# Patient Record
Sex: FEMALE | Race: WHITE | Employment: FULL TIME | URBAN - METROPOLITAN AREA
[De-identification: names, ages, dates, MRNs, and addresses within clinical notes are randomized per-mention and may not be internally consistent; named-entity substitution may affect disease eponyms.]

---

## 2018-02-06 PROBLEM — O02.1 MISSED ABORTION: Status: ACTIVE | Noted: 2018-02-06

## 2018-04-12 PROBLEM — N96 HISTORY OF RECURRENT MISCARRIAGES: Status: ACTIVE | Noted: 2018-04-12

## 2018-05-02 PROBLEM — D68.59 THROMBOPHILIA (HCC): Status: ACTIVE | Noted: 2018-05-02

## 2018-05-02 PROBLEM — N92.0 MENORRHAGIA WITH REGULAR CYCLE: Status: ACTIVE | Noted: 2018-05-02

## 2018-05-11 DIAGNOSIS — Z00.00 WELL ADULT EXAM: Primary | ICD-10-CM

## 2018-05-15 ENCOUNTER — HOSPITAL ENCOUNTER (OUTPATIENT)
Age: 23
Discharge: HOME | End: 2018-05-15
Payer: COMMERCIAL

## 2018-05-15 DIAGNOSIS — Z00.00 WELL ADULT EXAM: ICD-10-CM

## 2018-05-15 LAB
ALBUMIN SERPL-MCNC: 4.6 G/DL (ref 3.5–5.2)
ALP BLD-CCNC: 61 U/L (ref 35–104)
ALT SERPL-CCNC: 22 U/L (ref 0–32)
ANION GAP SERPL CALCULATED.3IONS-SCNC: 16 MMOL/L (ref 7–16)
AST SERPL-CCNC: 26 U/L (ref 0–31)
BILIRUB SERPL-MCNC: 0.5 MG/DL (ref 0–1.2)
BUN BLDV-MCNC: 11 MG/DL (ref 6–20)
CALCIUM SERPL-MCNC: 9.6 MG/DL (ref 8.6–10.2)
CHLORIDE BLD-SCNC: 95 MMOL/L (ref 98–107)
CHOLESTEROL, TOTAL: 183 MG/DL (ref 0–199)
CO2: 26 MMOL/L (ref 22–29)
CREAT SERPL-MCNC: 0.5 MG/DL (ref 0.5–1)
GFR AFRICAN AMERICAN: >60
GFR NON-AFRICAN AMERICAN: >60 ML/MIN/1.73
GLUCOSE BLD-MCNC: 81 MG/DL (ref 74–109)
HDLC SERPL-MCNC: 85 MG/DL
LDL CHOLESTEROL CALCULATED: 85 MG/DL (ref 0–99)
POTASSIUM SERPL-SCNC: 4.4 MMOL/L (ref 3.5–5)
SODIUM BLD-SCNC: 137 MMOL/L (ref 132–146)
TOTAL PROTEIN: 7.5 G/DL (ref 6.4–8.3)
TRIGL SERPL-MCNC: 63 MG/DL (ref 0–149)
VLDLC SERPL CALC-MCNC: 13 MG/DL

## 2018-05-15 PROCEDURE — 80053 COMPREHEN METABOLIC PANEL: CPT

## 2018-05-15 PROCEDURE — 80061 LIPID PANEL: CPT

## 2018-05-15 PROCEDURE — 36415 COLL VENOUS BLD VENIPUNCTURE: CPT

## 2018-05-18 ENCOUNTER — OFFICE VISIT (OUTPATIENT)
Dept: FAMILY MEDICINE CLINIC | Age: 23
End: 2018-05-18
Payer: COMMERCIAL

## 2018-05-18 VITALS
DIASTOLIC BLOOD PRESSURE: 83 MMHG | OXYGEN SATURATION: 100 % | WEIGHT: 113 LBS | HEART RATE: 85 BPM | BODY MASS INDEX: 22.19 KG/M2 | SYSTOLIC BLOOD PRESSURE: 123 MMHG | TEMPERATURE: 98.2 F | RESPIRATION RATE: 16 BRPM | HEIGHT: 60 IN

## 2018-05-18 DIAGNOSIS — Z00.00 WELL ADULT EXAM: Primary | ICD-10-CM

## 2018-05-18 PROCEDURE — 99395 PREV VISIT EST AGE 18-39: CPT | Performed by: FAMILY MEDICINE

## 2018-07-18 ENCOUNTER — HOSPITAL ENCOUNTER (OUTPATIENT)
Age: 23
Discharge: HOME | End: 2018-07-20
Payer: COMMERCIAL

## 2018-07-18 DIAGNOSIS — N92.6 IRREGULAR MENSES: ICD-10-CM

## 2018-07-18 PROBLEM — O99.281 METHYLENETETRAHYDROFOLATE REDUCTASE DEFICIENCY AFFECTING PREGNANCY IN FIRST TRIMESTER (HCC): Status: ACTIVE | Noted: 2018-07-18

## 2018-07-18 PROBLEM — E72.12 METHYLENETETRAHYDROFOLATE REDUCTASE DEFICIENCY AFFECTING PREGNANCY IN FIRST TRIMESTER (HCC): Status: ACTIVE | Noted: 2018-07-18

## 2018-07-18 PROCEDURE — 85027 COMPLETE CBC AUTOMATED: CPT

## 2018-07-18 PROCEDURE — 87088 URINE BACTERIA CULTURE: CPT

## 2018-07-19 LAB
HCT VFR BLD CALC: 40.8 % (ref 34–48)
HEMOGLOBIN: 13.9 G/DL (ref 11.5–15.5)
MCH RBC QN AUTO: 31.2 PG (ref 26–35)
MCHC RBC AUTO-ENTMCNC: 34.1 % (ref 32–34.5)
MCV RBC AUTO: 91.7 FL (ref 80–99.9)
PDW BLD-RTO: 12.9 FL (ref 11.5–15)
PLATELET # BLD: 221 E9/L (ref 130–450)
PMV BLD AUTO: 10 FL (ref 7–12)
RBC # BLD: 4.45 E12/L (ref 3.5–5.5)
WBC # BLD: 8.3 E9/L (ref 4.5–11.5)

## 2018-07-21 LAB — URINE CULTURE, ROUTINE: NORMAL

## 2018-07-31 DIAGNOSIS — G40.209 PARTIAL SYMPTOMATIC EPILEPSY WITH COMPLEX PARTIAL SEIZURES, NOT INTRACTABLE, WITHOUT STATUS EPILEPTICUS (HCC): ICD-10-CM

## 2018-07-31 NOTE — TELEPHONE ENCOUNTER
Please first ask the patient how for a long is she in this pregnancy. If she has passed her first trimester, please renew the [address]. If not, I will discuss the risks with her.   Thank you

## 2018-08-01 NOTE — TELEPHONE ENCOUNTER
Spoke with patient who states she is 8 weeks pregnant,. Instructed her that Dr Iliana Rg will be calling her regarding her medication.

## 2018-08-02 ENCOUNTER — TELEPHONE (OUTPATIENT)
Dept: NEUROLOGY | Age: 23
End: 2018-08-02

## 2018-08-02 RX ORDER — LEVETIRACETAM 500 MG/1
1000 TABLET, EXTENDED RELEASE ORAL 2 TIMES DAILY
Qty: 360 TABLET | Refills: 3 | Status: SHIPPED | OUTPATIENT
Start: 2018-08-02 | End: 2019-02-08 | Stop reason: SDUPTHER

## 2018-08-21 ENCOUNTER — TELEPHONE (OUTPATIENT)
Dept: NEUROLOGY | Age: 23
End: 2018-08-21

## 2018-08-21 ENCOUNTER — OFFICE VISIT (OUTPATIENT)
Dept: NEUROLOGY | Age: 23
End: 2018-08-21
Payer: COMMERCIAL

## 2018-08-21 VITALS
SYSTOLIC BLOOD PRESSURE: 114 MMHG | BODY MASS INDEX: 22.97 KG/M2 | WEIGHT: 117 LBS | DIASTOLIC BLOOD PRESSURE: 78 MMHG | OXYGEN SATURATION: 99 % | HEART RATE: 83 BPM | HEIGHT: 60 IN | TEMPERATURE: 97.4 F | RESPIRATION RATE: 19 BRPM

## 2018-08-21 DIAGNOSIS — R56.9 SEIZURES (HCC): Primary | ICD-10-CM

## 2018-08-21 PROBLEM — O02.1 MISSED ABORTION: Status: RESOLVED | Noted: 2018-02-06 | Resolved: 2018-08-21

## 2018-08-21 PROCEDURE — 99214 OFFICE O/P EST MOD 30 MIN: CPT | Performed by: PSYCHIATRY & NEUROLOGY

## 2018-08-21 NOTE — PROGRESS NOTES
This 47-year-old right-handed woman was referred for additional evaluation and management of seizures during her pregnancy. She was excellent historian. Her medications included Keppra 1000 mg twice daily, Lovenox 40 mg subcutaneous daily, aspirin 81 mg daily, folic acid 1 mg daily and prenatal vitamins daily. Her past medical history was not remarkable for hypertension, diabetes mellitus, heart disease, lung disorders, connective tissue disorders, gastrointestinal issues, skin abnormalities or cancers. She had endured 2 miscarriages and is now pregnant at 11 weeks. 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 Her 2 previous pregnancies ended at 7 weeks. She is now doing well at 11 weeks with this child---using aspirin and Lovenox. She denied any surgical histories. She had torn her right knee meniscus from running. She reported no allergies or trauma. She was  with no children. She was a native of the [facility]. 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 She did not smoke or drink. She did not use illicit drugs. Her family history was remarkable for her father suffering from 2 strokes at 47years of age and one cousin suffering from seizures. She denied any bleeding disorders or other neurological issues in her family. She was diagnosed with seizures at 13-4/1years of age. Her first event occurred on Christmas Eve when her mother found her shaking in bed. There was no incontinence or tongue biting. She suffered only one more seizure at 23years of age. Again she had generalized tonic-clonic movements with no apparent auras. She was treated with Keppra---now taking 1000 mg twice daily. She has tolerated that drug remarkably well. There have been no return of seizures. Her obstetrician claimed that drug was safe during a pregnancy.   She denied any neurological deficits---such as weakness, clumsiness,

## 2018-08-21 NOTE — TELEPHONE ENCOUNTER
No PA needed for CPT code 46410 and 37705. Call ref # [id number] per [facility].

## 2018-08-22 ENCOUNTER — TELEPHONE (OUTPATIENT)
Dept: NEUROLOGY | Age: 23
End: 2018-08-22

## 2018-08-27 ENCOUNTER — HOSPITAL ENCOUNTER (OUTPATIENT)
Age: 23
Discharge: HOME | End: 2018-08-29
Payer: COMMERCIAL

## 2018-08-27 DIAGNOSIS — Z34.01 ENCOUNTER FOR SUPERVISION OF NORMAL FIRST PREGNANCY IN FIRST TRIMESTER: ICD-10-CM

## 2018-08-27 LAB
HCT VFR BLD CALC: 38.5 % (ref 34–48)
HEMOGLOBIN: 13.1 G/DL (ref 11.5–15.5)
MCH RBC QN AUTO: 31.4 PG (ref 26–35)
MCHC RBC AUTO-ENTMCNC: 34 % (ref 32–34.5)
MCV RBC AUTO: 92.3 FL (ref 80–99.9)
PDW BLD-RTO: 13 FL (ref 11.5–15)
PLATELET # BLD: 233 E9/L (ref 130–450)
PMV BLD AUTO: 10.3 FL (ref 7–12)
RBC # BLD: 4.17 E12/L (ref 3.5–5.5)
WBC # BLD: 7.9 E9/L (ref 4.5–11.5)

## 2018-08-27 PROCEDURE — 84443 ASSAY THYROID STIM HORMONE: CPT

## 2018-08-27 PROCEDURE — 86900 BLOOD TYPING SEROLOGIC ABO: CPT

## 2018-08-27 PROCEDURE — 86592 SYPHILIS TEST NON-TREP QUAL: CPT

## 2018-08-27 PROCEDURE — 86703 HIV-1/HIV-2 1 RESULT ANTBDY: CPT

## 2018-08-27 PROCEDURE — 86850 RBC ANTIBODY SCREEN: CPT

## 2018-08-27 PROCEDURE — 86901 BLOOD TYPING SEROLOGIC RH(D): CPT

## 2018-08-27 PROCEDURE — 87340 HEPATITIS B SURFACE AG IA: CPT

## 2018-08-27 PROCEDURE — 86762 RUBELLA ANTIBODY: CPT

## 2018-08-27 PROCEDURE — 86787 VARICELLA-ZOSTER ANTIBODY: CPT

## 2018-08-27 PROCEDURE — 85027 COMPLETE CBC AUTOMATED: CPT

## 2018-08-28 LAB
ABO/RH: NORMAL
ANTIBODY SCREEN: NORMAL
HEPATITIS B SURFACE ANTIGEN INTERPRETATION: NORMAL
HIV-1 AND HIV-2 ANTIBODIES: NORMAL
RPR: NORMAL
RUBELLA ANTIBODY IGG: NORMAL
TSH SERPL DL<=0.05 MIU/L-ACNC: 1.79 UIU/ML (ref 0.27–4.2)
VARICELLA-ZOSTER VIRUS AB, IGG: NORMAL

## 2018-08-31 ENCOUNTER — OFFICE VISIT (OUTPATIENT)
Dept: FAMILY MEDICINE CLINIC | Age: 23
End: 2018-08-31
Payer: COMMERCIAL

## 2018-08-31 VITALS
SYSTOLIC BLOOD PRESSURE: 117 MMHG | OXYGEN SATURATION: 99 % | HEIGHT: 60 IN | DIASTOLIC BLOOD PRESSURE: 77 MMHG | WEIGHT: 117 LBS | BODY MASS INDEX: 22.97 KG/M2 | HEART RATE: 91 BPM | RESPIRATION RATE: 16 BRPM

## 2018-08-31 DIAGNOSIS — J06.9 VIRAL URI: Primary | ICD-10-CM

## 2018-08-31 DIAGNOSIS — J02.9 SORE THROAT: ICD-10-CM

## 2018-08-31 PROCEDURE — 87880 STREP A ASSAY W/OPTIC: CPT | Performed by: FAMILY MEDICINE

## 2018-08-31 PROCEDURE — 99213 OFFICE O/P EST LOW 20 MIN: CPT | Performed by: FAMILY MEDICINE

## 2018-08-31 RX ORDER — AZITHROMYCIN 250 MG/1
TABLET, FILM COATED ORAL
Qty: 1 PACKET | Refills: 0 | Status: SHIPPED | OUTPATIENT
Start: 2018-08-31 | End: 2018-09-04

## 2018-08-31 NOTE — PROGRESS NOTES
Congestion:  Patient is here with complaints of sore throat  for 2 day(s). Cough is not productive. Over the counter medications include none. Patient does not have a change in appetite. Patient is  drinking well. Patient does not smoke. Patient is also 12 weeks pregnant. Patient's past medical, surgical, social and/or family history reviewed, updated in chart, and are non-contributory (unless otherwise stated). Medications and allergies also reviewed and updated in chart. Review of Systems:  Constitutional:  - fever, + fatigue, + chills, + headaches, no weight change, +reduced appetite  Dermatology:  No rash, no mole, no dry or sensitive skin  ENT:  + cough, + sore throat, + sinus pain, + runny nose, no ear pain  Cardiology:  No chest pain, no palpitations, no leg edema, no shortness of breath, no PND  Gastroenterology:  No dysphagia, no abdominal pain, no nausea, no vomiting, no constipation, no diarrhea, no heartburn  Musculoskeletal:  No joint pain, no leg cramps, no back pain, no muscle aches  Respiratory:  No shortness of breath, no orthopnea, no wheezing, no BENÍTEZ, no hemoptysis  Urology:  No blood in the urine, no urinary frequency, no urinary incontinence, no urinary urgency, no nocturia, no dysuria          Past Medical History, Surgical History, and Family History has been reviewed and updated.     Physical Exam:  Vitals:    08/31/18 0822   BP: 117/77   Pulse: 91   Resp: 16   SpO2: 99%   Weight: 117 lb (53.1 kg)   Height: 5' (1.524 m)     General:  Patient alert and oriented x 3, NAD, pleasant  HEENT:  Atraumatic, normocephalic, PERRLA, EOMI, clear conjunctiva, TMs clear, nose-congested, + sinus tenderness, throat - + erythema  Neck:  Supple, no goiter, no carotid bruits, no LAD  Lungs:  CTA B  Heart:  RRR, no murmurs, gallops or rubs  Abdomen:  Soft, NTND, + bowel sounds  Extremities:  No clubbing, cyanosis or edema  Skin: no rashes    Assessment/Plan:  Keren John was seen today for pharyngitis. Diagnoses and all orders for this visit:    Viral URI    Sore throat  -     POCT rapid strep A    Other orders  -     azithromycin (ZITHROMAX Z-MIQUEL) 250 MG tablet; Take 2 tablets (500 mg) on Day 1, and then take 1 tablet (250 mg) on days 2 through 5. Increase fluids, rest, Tylenol every 4-6 hours as needed for fever or body aches. Return to office if symptoms worsen. As above. Call or go to ED immediately if symptoms worsen or persist.  No Follow-up on file. , or sooner if necessary. Educational materials and/or home exercises printed for patient's review and were included in patient instructions on his/her After Visit Summary and given to patient at the end of visit. Counseled regarding above diagnosis, including possible risks and complications,  especially if left uncontrolled. Counseled regarding the possible side effects, risks, benefits and alternatives to treatment; patient and/or guardian verbalizes understanding, agrees, feels comfortable with and wishes to proceed with above treatment plan. Advised patient to call with any new medication issues, and read all Rx info from pharmacy to assure aware of all possible risks and side effects of medication before taking. Patient and/or guardian verbalizes understanding and agrees with above counseling, assessment and plan. All questions answered.

## 2018-09-04 ENCOUNTER — OFFICE VISIT (OUTPATIENT)
Dept: NEUROLOGY | Age: 23
End: 2018-09-04
Payer: COMMERCIAL

## 2018-09-04 VITALS
HEART RATE: 86 BPM | TEMPERATURE: 97.3 F | SYSTOLIC BLOOD PRESSURE: 108 MMHG | BODY MASS INDEX: 23.32 KG/M2 | HEIGHT: 60 IN | WEIGHT: 118.8 LBS | OXYGEN SATURATION: 99 % | DIASTOLIC BLOOD PRESSURE: 73 MMHG

## 2018-09-04 DIAGNOSIS — G40.209 PARTIAL SYMPTOMATIC EPILEPSY WITH COMPLEX PARTIAL SEIZURES, NOT INTRACTABLE, WITHOUT STATUS EPILEPTICUS (HCC): Primary | ICD-10-CM

## 2018-09-04 PROCEDURE — 99214 OFFICE O/P EST MOD 30 MIN: CPT | Performed by: CLINICAL NURSE SPECIALIST

## 2018-09-04 NOTE — PROGRESS NOTES
Xchltb-NpDks-YcUlj-Meth-FA-DHA (PRENATE MINI) 18-0.6-0.4-350 MG CAPS Take 1 tablet by mouth daily Yes Chioma Mendiola MD   aspirin 81 MG tablet Take 81 mg by mouth daily Yes Historical Provider, MD   folic acid (FOLVITE) 1 MG tablet Take 1 mg by mouth daily Yes Historical Provider, MD   azithromycin (ZITHROMAX Z-MIQUEL) 250 MG tablet Take 2 tablets (500 mg) on Day 1, and then take 1 tablet (250 mg) on days 2 through 5.   Sandi Priest MD     Allergies as of 09/04/2018    (No Known Allergies)       Objective:     /73 (Site: Left Arm, Position: Sitting, Cuff Size: Medium Adult)   Pulse 86   Temp 97.3 °F (36.3 °C)   Ht 5' (1.524 m)   Wt 118 lb 12.8 oz (53.9 kg)   LMP 06/05/2018   SpO2 99%   BMI 23.20 kg/m²      General appearance: alert, appears stated age and cooperative  Head: Normocephalic, without obvious abnormality, atraumatic  Neck: no adenopathy, no carotid bruit and limited ROM  Lungs: clear to auscultation bilaterally  Heart: regular rate and rhythm  Extremities: normal without edema no clubbing or cyanosis   Pulses: 2+ and symmetric  Skin: WDI  no rashes or lesions     Mental Status: Alert, oriented, thought content appropriate    Speech: clear  Language: appropriate     Cranial Nerves:  I: smell    II: visual acuity     II: visual fields Full    II: pupils YUKO   III,VII: ptosis None   III,IV,VI: extraocular muscles  EOMI without nystagmus    V: mastication Normal   V: facial light touch sensation  Normal   V,VII: corneal reflex  Present   VII: facial muscle function - upper     VII: facial muscle function - lower Normal   VIII: hearing Normal   IX: soft palate elevation  Normal   IX,X: gag reflex Present   XI: trapezius strength  5/5   XI: sternocleidomastoid strength 5/5   XI: neck extension strength  5/5   XII: tongue strength  Normal     Motor:  5/5 throughout  Normal bulk and tone     Sensory:  LT and PP normal  Vibration normal     Coordination:   FN, FFM and ANITA normal  HS AM  9/4/2018

## 2018-09-30 PROBLEM — N92.0 MENORRHAGIA WITH REGULAR CYCLE: Status: RESOLVED | Noted: 2018-05-02 | Resolved: 2018-09-30

## 2018-10-22 PROBLEM — Z34.02 ENCOUNTER FOR SUPERVISION OF NORMAL FIRST PREGNANCY IN SECOND TRIMESTER: Status: ACTIVE | Noted: 2018-10-22

## 2018-12-11 ENCOUNTER — HOSPITAL ENCOUNTER (EMERGENCY)
Age: 23
Discharge: HOME | End: 2018-12-11
Payer: COMMERCIAL

## 2018-12-11 ENCOUNTER — APPOINTMENT (OUTPATIENT)
Dept: ULTRASOUND IMAGING | Age: 23
End: 2018-12-11
Payer: COMMERCIAL

## 2018-12-11 VITALS
OXYGEN SATURATION: 99 % | WEIGHT: 140 LBS | DIASTOLIC BLOOD PRESSURE: 85 MMHG | BODY MASS INDEX: 27.48 KG/M2 | TEMPERATURE: 98.2 F | HEIGHT: 60 IN | HEART RATE: 103 BPM | RESPIRATION RATE: 18 BRPM | SYSTOLIC BLOOD PRESSURE: 116 MMHG
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
## 2018-12-11 PROCEDURE — 99284 EMERGENCY DEPT VISIT MOD MDM: CPT

## 2018-12-11 PROCEDURE — 76805 OB US >/= 14 WKS SNGL FETUS: CPT

## 2018-12-15 ENCOUNTER — HOSPITAL ENCOUNTER (OUTPATIENT)
Age: 23
Setting detail: OBSERVATION
Discharge: HOME | End: 2018-12-16
Attending: OBSTETRICS & GYNECOLOGY | Admitting: OBSTETRICS & GYNECOLOGY
Payer: COMMERCIAL

## 2018-12-15 PROBLEM — O46.92 ANTEPARTUM BLEEDING, SECOND TRIMESTER: Status: ACTIVE | Noted: 2018-12-15

## 2018-12-15 LAB
BACTERIA: ABNORMAL /HPF
BILIRUBIN URINE: NEGATIVE
BLOOD, URINE: ABNORMAL
CLARITY: CLEAR
COLOR: YELLOW
EPITHELIAL CELLS, UA: ABNORMAL /HPF
GLUCOSE URINE: NEGATIVE MG/DL
KETONES, URINE: NEGATIVE MG/DL
LEUKOCYTE ESTERASE, URINE: NEGATIVE
NITRITE, URINE: NEGATIVE
PH UA: 7.5 (ref 5–9)
PROTEIN UA: NEGATIVE MG/DL
RBC UA: ABNORMAL /HPF (ref 0–2)
SPECIFIC GRAVITY UA: 1.01 (ref 1–1.03)
UROBILINOGEN, URINE: 0.2 E.U./DL
WBC UA: ABNORMAL /HPF (ref 0–5)

## 2018-12-15 PROCEDURE — G0379 DIRECT REFER HOSPITAL OBSERV: HCPCS

## 2018-12-15 PROCEDURE — G0378 HOSPITAL OBSERVATION PER HR: HCPCS

## 2018-12-15 PROCEDURE — 81001 URINALYSIS AUTO W/SCOPE: CPT

## 2018-12-15 PROCEDURE — 87088 URINE BACTERIA CULTURE: CPT

## 2018-12-15 RX ORDER — LEVETIRACETAM 500 MG/1
1000 TABLET, EXTENDED RELEASE ORAL 2 TIMES DAILY
Status: DISCONTINUED | OUTPATIENT
Start: 2018-12-15 | End: 2018-12-16 | Stop reason: HOSPADM

## 2018-12-15 RX ADMIN — LEVETIRACETAM 1000 MG: 500 TABLET, EXTENDED RELEASE ORAL at 21:15

## 2018-12-15 NOTE — PROGRESS NOTES
Patient resting comfortably at bed. Denies further bleeding. Denies lof, decreased fm, and cramping ctx. Call light in reach. Will call for any further bleeding/issues.

## 2018-12-15 NOTE — H&P
Department of Obstetrics and Gynecology  Labor and Delivery  Triage Note      SUBJECTIVE:  Jen Chatterjee is a 20 y.o. female, , Patient's last menstrual period was 2018., Estimated Date of Delivery: 3/11/19, 27w5d, here with the complaint of large spot of bright red blood in her underwear when getting a pedicure. Since then she has wiped once here with just a small streak on the toilet paper.  Denies abdominal pain, intercourse or trauma    Prenatal course: MTHFR      Review of Systems:   Ears, nose, mouth, throat, and face: negative  Respiratory: negative  Cardiovascular: negative  Gastrointestinal: negative  Genitourinary:negative  Integument/breast: negative  Hematologic/lymphatic: negative  Musculoskeletal:negative  Neurological: negative  Behavioral/Psych: negative  Endocrine: negative  Allergic/Immunologic: negative    OBJECTIVE    Vitals:  /76   Pulse 97   LMP 2018   General- alert, NAD  Skin warm and dry, no rashes seen  Psych- normal mood and affect  Abdomen soft, NT/ND  External genitalia- no lesion  Fetal heart rate:         Baseline Heart Rate:  130          Contraction frequency:none     ASSESSMENT:    Antepartum bleeding  thrombophilia    Plan: d/w Dr. Dara Krishna    MFM consult

## 2018-12-16 VITALS
DIASTOLIC BLOOD PRESSURE: 65 MMHG | SYSTOLIC BLOOD PRESSURE: 111 MMHG | HEIGHT: 60 IN | RESPIRATION RATE: 18 BRPM | HEART RATE: 95 BPM | WEIGHT: 140 LBS | BODY MASS INDEX: 27.48 KG/M2 | TEMPERATURE: 97.7 F

## 2018-12-16 PROCEDURE — 76811 OB US DETAILED SNGL FETUS: CPT

## 2018-12-16 PROCEDURE — G0378 HOSPITAL OBSERVATION PER HR: HCPCS

## 2018-12-16 PROCEDURE — 76817 TRANSVAGINAL US OBSTETRIC: CPT

## 2018-12-16 PROCEDURE — 76819 FETAL BIOPHYS PROFIL W/O NST: CPT

## 2018-12-16 PROCEDURE — 76817 TRANSVAGINAL US OBSTETRIC: CPT | Performed by: OBSTETRICS & GYNECOLOGY

## 2018-12-16 PROCEDURE — 76819 FETAL BIOPHYS PROFIL W/O NST: CPT | Performed by: OBSTETRICS & GYNECOLOGY

## 2018-12-16 PROCEDURE — 6360000002 HC RX W HCPCS: Performed by: OBSTETRICS & GYNECOLOGY

## 2018-12-16 PROCEDURE — 96372 THER/PROPH/DIAG INJ SC/IM: CPT

## 2018-12-16 PROCEDURE — 76811 OB US DETAILED SNGL FETUS: CPT | Performed by: OBSTETRICS & GYNECOLOGY

## 2018-12-16 PROCEDURE — 99242 OFF/OP CONSLTJ NEW/EST SF 20: CPT | Performed by: OBSTETRICS & GYNECOLOGY

## 2018-12-16 RX ADMIN — ENOXAPARIN SODIUM 40 MG: 40 INJECTION SUBCUTANEOUS at 09:53

## 2018-12-16 RX ADMIN — LEVETIRACETAM 1000 MG: 500 TABLET, EXTENDED RELEASE ORAL at 08:30

## 2018-12-16 NOTE — CONSULTS
diseases. 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 PAST SURGICAL HISTORY:  Past Surgical History:   Procedure Laterality Date    DILATION AND CURETTAGE OF UTERUS  2018    SUCTION    MENISCECTOMY Right 16    J. Carrington    TONSILLECTOMY      23    WISDOM TOOTH EXTRACTION     Negative for Appendectomy, Cholecystectomy or surgery on the cervix such as LEEP, [facility] or Cryotherapy. ALLERGIES:    No Known Allergies    MEDICATIONS:    Prenatal Vitamins once per day   Folic acid 1 mg by mouth per day. Lovenox 40 mg subcutaneous once per day. Keppra 1000 mg by mouth twice a day. Baby aspirin 81 mg once per day. SOCIAL  HISTORY: Denies smoking, Alcohol and Drug abuse. REVIEW OF SYSTEMS:    CONSTITUTIONAL : No fever, no chills   HEENT :  No headache, no visual changes, no rhinorrhea, no sore throat   CARDIOVASCULAR :  No pain, no palpitations, no edema   RESPIRATORY :  No pain, no shortness of breath   GASTROINTESTINAL : No N/V, no D/C, no abdominal pain   GENITOURINARY :  No dysuria, hematuria and no incontinence   MUSCULOSKELETAL:  No myalgia, No back pain  NEUROLOGICAL :  No numbness, no tingling, no tremors. HISTORY OF SEIZURES    FAMILY MEDICAL HISTORY:   Negative for birth defects, chromosomal anomalies and Mental retardation. PHYSICAL EXAMINATION:    General Appearance:  Healthy looking, alert, no acute distress. Eyes:     No pallor, no icterus, no photophobia.   Ears:     No ear

## 2018-12-17 ENCOUNTER — HOSPITAL ENCOUNTER (OUTPATIENT)
Age: 23
Discharge: HOME | End: 2018-12-19
Payer: COMMERCIAL

## 2018-12-17 DIAGNOSIS — O46.93 THIRD TRIMESTER BLEEDING: ICD-10-CM

## 2018-12-17 DIAGNOSIS — O99.283 METHYLENETETRAHYDROFOLATE REDUCTASE DEFICIENCY AFFECTING PREGNANCY IN THIRD TRIMESTER (HCC): ICD-10-CM

## 2018-12-17 DIAGNOSIS — O09.93 HIGH-RISK PREGNANCY IN THIRD TRIMESTER: ICD-10-CM

## 2018-12-17 DIAGNOSIS — E72.12 METHYLENETETRAHYDROFOLATE REDUCTASE DEFICIENCY AFFECTING PREGNANCY IN THIRD TRIMESTER (HCC): ICD-10-CM

## 2018-12-17 PROBLEM — O46.92 ANTEPARTUM BLEEDING, SECOND TRIMESTER: Status: RESOLVED | Noted: 2018-12-15 | Resolved: 2018-12-17

## 2018-12-17 PROCEDURE — 85027 COMPLETE CBC AUTOMATED: CPT

## 2018-12-17 PROCEDURE — 82950 GLUCOSE TEST: CPT

## 2018-12-18 LAB
GLUCOSE TOLERANCE SCREEN 50G: 166 MG/DL (ref 70–140)
HCT VFR BLD CALC: 36.4 % (ref 34–48)
HEMOGLOBIN: 11.9 G/DL (ref 11.5–15.5)
MCH RBC QN AUTO: 32.2 PG (ref 26–35)
MCHC RBC AUTO-ENTMCNC: 32.7 % (ref 32–34.5)
MCV RBC AUTO: 98.4 FL (ref 80–99.9)
PDW BLD-RTO: 13.2 FL (ref 11.5–15)
PLATELET # BLD: 201 E9/L (ref 130–450)
PMV BLD AUTO: 10.2 FL (ref 7–12)
RBC # BLD: 3.7 E12/L (ref 3.5–5.5)
URINE CULTURE, ROUTINE: NORMAL
WBC # BLD: 10.6 E9/L (ref 4.5–11.5)

## 2018-12-27 ENCOUNTER — HOSPITAL ENCOUNTER (OUTPATIENT)
Age: 23
Discharge: HOME | End: 2018-12-29
Payer: COMMERCIAL

## 2018-12-27 DIAGNOSIS — O99.283 METHYLENETETRAHYDROFOLATE REDUCTASE DEFICIENCY AFFECTING PREGNANCY IN THIRD TRIMESTER (HCC): ICD-10-CM

## 2018-12-27 DIAGNOSIS — O46.93 THIRD TRIMESTER BLEEDING: ICD-10-CM

## 2018-12-27 DIAGNOSIS — E72.12 METHYLENETETRAHYDROFOLATE REDUCTASE DEFICIENCY AFFECTING PREGNANCY IN THIRD TRIMESTER (HCC): ICD-10-CM

## 2018-12-27 DIAGNOSIS — O09.93 HIGH-RISK PREGNANCY IN THIRD TRIMESTER: ICD-10-CM

## 2018-12-27 PROCEDURE — 82951 GLUCOSE TOLERANCE TEST (GTT): CPT

## 2018-12-28 ENCOUNTER — HOSPITAL ENCOUNTER (OUTPATIENT)
Age: 23
Discharge: HOME | End: 2018-12-28
Payer: COMMERCIAL

## 2018-12-28 LAB
ANTIBODY SCREEN: NORMAL
GLUCOSE FASTING: ABNORMAL MG/DL
GLUCOSE TOLERANCE TEST 1 HOUR: 132 MG/DL
GLUCOSE TOLERANCE TEST 2 HOUR: 110 MG/DL
GLUCOSE TOLERANCE TEST 2 HOUR: ABNORMAL MG/DL
GLUCOSE TOLERANCE TEST FASTING: 60 MG/DL

## 2018-12-28 PROCEDURE — 36415 COLL VENOUS BLD VENIPUNCTURE: CPT

## 2018-12-28 PROCEDURE — 80177 DRUG SCRN QUAN LEVETIRACETAM: CPT

## 2018-12-28 PROCEDURE — 86850 RBC ANTIBODY SCREEN: CPT

## 2018-12-30 LAB — KEPPRA: 18 UG/ML (ref 12–46)

## 2018-12-31 ENCOUNTER — HOSPITAL ENCOUNTER (OUTPATIENT)
Age: 23
Setting detail: OBSERVATION
Discharge: HOME | End: 2019-01-01
Attending: OBSTETRICS & GYNECOLOGY | Admitting: OBSTETRICS & GYNECOLOGY
Payer: COMMERCIAL

## 2018-12-31 PROBLEM — O44.03 PLACENTA PREVIA ANTEPARTUM, THIRD TRIMESTER: Status: ACTIVE | Noted: 2018-12-31

## 2018-12-31 LAB
ABO/RH: NORMAL
ANTIBODY SCREEN: NORMAL
HCT VFR BLD CALC: 37.1 % (ref 34–48)
HEMOGLOBIN: 12.7 G/DL (ref 11.5–15.5)
MCH RBC QN AUTO: 32.3 PG (ref 26–35)
MCHC RBC AUTO-ENTMCNC: 34.2 % (ref 32–34.5)
MCV RBC AUTO: 94.4 FL (ref 80–99.9)
PDW BLD-RTO: 12.8 FL (ref 11.5–15)
PLATELET # BLD: 214 E9/L (ref 130–450)
PMV BLD AUTO: 9.7 FL (ref 7–12)
RBC # BLD: 3.93 E12/L (ref 3.5–5.5)
WBC # BLD: 12.3 E9/L (ref 4.5–11.5)

## 2018-12-31 PROCEDURE — 51702 INSERT TEMP BLADDER CATH: CPT

## 2018-12-31 PROCEDURE — 6360000002 HC RX W HCPCS: Performed by: OBSTETRICS & GYNECOLOGY

## 2018-12-31 PROCEDURE — 85027 COMPLETE CBC AUTOMATED: CPT

## 2018-12-31 PROCEDURE — G0378 HOSPITAL OBSERVATION PER HR: HCPCS

## 2018-12-31 PROCEDURE — 36415 COLL VENOUS BLD VENIPUNCTURE: CPT

## 2018-12-31 PROCEDURE — 96366 THER/PROPH/DIAG IV INF ADDON: CPT

## 2018-12-31 PROCEDURE — 99211 OFF/OP EST MAY X REQ PHY/QHP: CPT

## 2018-12-31 PROCEDURE — 96372 THER/PROPH/DIAG INJ SC/IM: CPT

## 2018-12-31 PROCEDURE — 96365 THER/PROPH/DIAG IV INF INIT: CPT

## 2018-12-31 PROCEDURE — 2580000003 HC RX 258: Performed by: OBSTETRICS & GYNECOLOGY

## 2018-12-31 PROCEDURE — 86900 BLOOD TYPING SEROLOGIC ABO: CPT

## 2018-12-31 PROCEDURE — 86901 BLOOD TYPING SEROLOGIC RH(D): CPT

## 2018-12-31 PROCEDURE — 86850 RBC ANTIBODY SCREEN: CPT

## 2018-12-31 RX ORDER — SODIUM CHLORIDE, SODIUM LACTATE, POTASSIUM CHLORIDE, CALCIUM CHLORIDE 600; 310; 30; 20 MG/100ML; MG/100ML; MG/100ML; MG/100ML
INJECTION, SOLUTION INTRAVENOUS CONTINUOUS
Status: DISCONTINUED | OUTPATIENT
Start: 2018-12-31 | End: 2019-01-01 | Stop reason: HOSPADM

## 2018-12-31 RX ORDER — BETAMETHASONE SODIUM PHOSPHATE AND BETAMETHASONE ACETATE 3; 3 MG/ML; MG/ML
12 INJECTION, SUSPENSION INTRA-ARTICULAR; INTRALESIONAL; INTRAMUSCULAR; SOFT TISSUE EVERY 24 HOURS
Status: COMPLETED | OUTPATIENT
Start: 2018-12-31 | End: 2019-01-01

## 2018-12-31 RX ADMIN — BETAMETHASONE SODIUM PHOSPHATE AND BETAMETHASONE ACETATE 12 MG: 3; 3 INJECTION, SUSPENSION INTRA-ARTICULAR; INTRALESIONAL; INTRAMUSCULAR at 20:30

## 2018-12-31 RX ADMIN — SODIUM CHLORIDE, POTASSIUM CHLORIDE, SODIUM LACTATE AND CALCIUM CHLORIDE: 600; 310; 30; 20 INJECTION, SOLUTION INTRAVENOUS at 20:30

## 2018-12-31 RX ADMIN — MAGNESIUM SULFATE HEPTAHYDRATE 2 G/HR: 40 INJECTION, SOLUTION INTRAVENOUS at 20:29

## 2019-01-01 VITALS
SYSTOLIC BLOOD PRESSURE: 114 MMHG | TEMPERATURE: 97.4 F | WEIGHT: 145 LBS | HEART RATE: 112 BPM | RESPIRATION RATE: 14 BRPM | OXYGEN SATURATION: 97 % | BODY MASS INDEX: 28.47 KG/M2 | DIASTOLIC BLOOD PRESSURE: 65 MMHG | HEIGHT: 60 IN

## 2019-01-01 PROCEDURE — 96372 THER/PROPH/DIAG INJ SC/IM: CPT

## 2019-01-01 PROCEDURE — 99242 OFF/OP CONSLTJ NEW/EST SF 20: CPT | Performed by: OBSTETRICS & GYNECOLOGY

## 2019-01-01 PROCEDURE — 76817 TRANSVAGINAL US OBSTETRIC: CPT

## 2019-01-01 PROCEDURE — G0378 HOSPITAL OBSERVATION PER HR: HCPCS

## 2019-01-01 PROCEDURE — 76819 FETAL BIOPHYS PROFIL W/O NST: CPT

## 2019-01-01 PROCEDURE — 76817 TRANSVAGINAL US OBSTETRIC: CPT | Performed by: OBSTETRICS & GYNECOLOGY

## 2019-01-01 PROCEDURE — 76815 OB US LIMITED FETUS(S): CPT | Performed by: OBSTETRICS & GYNECOLOGY

## 2019-01-01 PROCEDURE — 76815 OB US LIMITED FETUS(S): CPT

## 2019-01-01 PROCEDURE — 6360000002 HC RX W HCPCS: Performed by: OBSTETRICS & GYNECOLOGY

## 2019-01-01 PROCEDURE — 96366 THER/PROPH/DIAG IV INF ADDON: CPT

## 2019-01-01 PROCEDURE — 76819 FETAL BIOPHYS PROFIL W/O NST: CPT | Performed by: OBSTETRICS & GYNECOLOGY

## 2019-01-01 RX ADMIN — BETAMETHASONE SODIUM PHOSPHATE AND BETAMETHASONE ACETATE 12 MG: 3; 3 INJECTION, SUSPENSION INTRA-ARTICULAR; INTRALESIONAL; INTRAMUSCULAR at 12:17

## 2019-01-16 ENCOUNTER — NURSE ONLY (OUTPATIENT)
Dept: FAMILY MEDICINE CLINIC | Age: 24
End: 2019-01-16
Payer: COMMERCIAL

## 2019-01-16 DIAGNOSIS — Z23 NEED FOR TDAP VACCINATION: Primary | ICD-10-CM

## 2019-01-16 PROCEDURE — 90715 TDAP VACCINE 7 YRS/> IM: CPT | Performed by: FAMILY MEDICINE

## 2019-01-16 PROCEDURE — 90471 IMMUNIZATION ADMIN: CPT | Performed by: FAMILY MEDICINE

## 2019-01-29 ENCOUNTER — ROUTINE PRENATAL (OUTPATIENT)
Dept: OBGYN CLINIC | Age: 24
End: 2019-01-29
Payer: COMMERCIAL

## 2019-01-29 VITALS
HEART RATE: 114 BPM | BODY MASS INDEX: 29.88 KG/M2 | SYSTOLIC BLOOD PRESSURE: 130 MMHG | DIASTOLIC BLOOD PRESSURE: 89 MMHG | WEIGHT: 153 LBS

## 2019-01-29 DIAGNOSIS — Z3A.34 34 WEEKS GESTATION OF PREGNANCY: ICD-10-CM

## 2019-01-29 DIAGNOSIS — D68.9 BLOOD COAGULATION DISORDER COMPLICATING PREGNANCY, THIRD TRIMESTER (HCC): Primary | ICD-10-CM

## 2019-01-29 DIAGNOSIS — Z79.01 LONG TERM CURRENT USE OF ANTICOAGULANT THERAPY: ICD-10-CM

## 2019-01-29 DIAGNOSIS — O99.353 SEIZURE DISORDER DURING PREGNANCY, THIRD TRIMESTER (HCC): ICD-10-CM

## 2019-01-29 DIAGNOSIS — O99.113 BLOOD COAGULATION DISORDER COMPLICATING PREGNANCY, THIRD TRIMESTER (HCC): Primary | ICD-10-CM

## 2019-01-29 DIAGNOSIS — G40.909 SEIZURE DISORDER DURING PREGNANCY, THIRD TRIMESTER (HCC): ICD-10-CM

## 2019-01-29 DIAGNOSIS — O35.5XX0 SUSPECTED DAMAGE TO FETUS FROM DRUGS, AFFECTING MANAGEMENT OF MOTHER, SINGLE OR UNSPECIFIED FETUS: ICD-10-CM

## 2019-01-29 LAB
GLUCOSE URINE, POC: NEGATIVE
PROTEIN UA: NEGATIVE

## 2019-01-29 PROCEDURE — 76816 OB US FOLLOW-UP PER FETUS: CPT | Performed by: OBSTETRICS & GYNECOLOGY

## 2019-01-29 PROCEDURE — 99211 OFF/OP EST MAY X REQ PHY/QHP: CPT | Performed by: OBSTETRICS & GYNECOLOGY

## 2019-01-29 PROCEDURE — 81002 URINALYSIS NONAUTO W/O SCOPE: CPT | Performed by: OBSTETRICS & GYNECOLOGY

## 2019-01-29 PROCEDURE — 99213 OFFICE O/P EST LOW 20 MIN: CPT | Performed by: OBSTETRICS & GYNECOLOGY

## 2019-01-29 PROCEDURE — 76819 FETAL BIOPHYS PROFIL W/O NST: CPT | Performed by: OBSTETRICS & GYNECOLOGY

## 2019-02-04 ENCOUNTER — HOSPITAL ENCOUNTER (OUTPATIENT)
Age: 24
Discharge: HOME | End: 2019-02-06
Payer: COMMERCIAL

## 2019-02-04 DIAGNOSIS — O44.03 PLACENTA PREVIA ANTEPARTUM, THIRD TRIMESTER: ICD-10-CM

## 2019-02-04 DIAGNOSIS — O09.93 HIGH-RISK PREGNANCY IN THIRD TRIMESTER: ICD-10-CM

## 2019-02-04 PROCEDURE — 87491 CHLMYD TRACH DNA AMP PROBE: CPT

## 2019-02-04 PROCEDURE — 87591 N.GONORRHOEAE DNA AMP PROB: CPT

## 2019-02-04 PROCEDURE — 87081 CULTURE SCREEN ONLY: CPT

## 2019-02-08 ENCOUNTER — OFFICE VISIT (OUTPATIENT)
Dept: NEUROLOGY | Age: 24
End: 2019-02-08
Payer: COMMERCIAL

## 2019-02-08 VITALS
HEART RATE: 100 BPM | BODY MASS INDEX: 31.9 KG/M2 | WEIGHT: 162.5 LBS | OXYGEN SATURATION: 97 % | RESPIRATION RATE: 18 BRPM | SYSTOLIC BLOOD PRESSURE: 118 MMHG | DIASTOLIC BLOOD PRESSURE: 73 MMHG | HEIGHT: 60 IN

## 2019-02-08 DIAGNOSIS — G40.209 PARTIAL SYMPTOMATIC EPILEPSY WITH COMPLEX PARTIAL SEIZURES, NOT INTRACTABLE, WITHOUT STATUS EPILEPTICUS (HCC): Primary | ICD-10-CM

## 2019-02-08 LAB — GROUP B STREP CULTURE: NORMAL

## 2019-02-08 PROCEDURE — 99214 OFFICE O/P EST MOD 30 MIN: CPT | Performed by: CLINICAL NURSE SPECIALIST

## 2019-02-08 RX ORDER — LEVETIRACETAM 500 MG/1
1000 TABLET, EXTENDED RELEASE ORAL 2 TIMES DAILY
Qty: 360 TABLET | Refills: 3 | Status: SHIPPED
Start: 2019-02-08 | End: 2020-03-27 | Stop reason: ALTCHOICE

## 2019-02-09 ENCOUNTER — HOSPITAL ENCOUNTER (INPATIENT)
Age: 24
LOS: 2 days | Discharge: HOME | End: 2019-02-12
Attending: OBSTETRICS & GYNECOLOGY | Admitting: OBSTETRICS & GYNECOLOGY
Payer: COMMERCIAL

## 2019-02-09 DIAGNOSIS — Z3A.35 35 WEEKS GESTATION OF PREGNANCY: Primary | ICD-10-CM

## 2019-02-09 LAB
BACTERIA: ABNORMAL /HPF
BASOPHILS ABSOLUTE: 0.04 E9/L (ref 0–0.2)
BASOPHILS RELATIVE PERCENT: 0.3 % (ref 0–2)
BILIRUBIN URINE: NEGATIVE
BLOOD, URINE: ABNORMAL
CLARITY: CLEAR
COLOR: ABNORMAL
EOSINOPHILS ABSOLUTE: 0.06 E9/L (ref 0.05–0.5)
EOSINOPHILS RELATIVE PERCENT: 0.4 % (ref 0–6)
EPITHELIAL CELLS, UA: ABNORMAL /HPF
GLUCOSE URINE: NEGATIVE MG/DL
HCT VFR BLD CALC: 35.2 % (ref 34–48)
HEMOGLOBIN: 12.3 G/DL (ref 11.5–15.5)
IMMATURE GRANULOCYTES #: 0.21 E9/L
IMMATURE GRANULOCYTES %: 1.4 % (ref 0–5)
KETONES, URINE: NEGATIVE MG/DL
LEUKOCYTE ESTERASE, URINE: ABNORMAL
LYMPHOCYTES ABSOLUTE: 2.24 E9/L (ref 1.5–4)
LYMPHOCYTES RELATIVE PERCENT: 15.1 % (ref 20–42)
MCH RBC QN AUTO: 32.1 PG (ref 26–35)
MCHC RBC AUTO-ENTMCNC: 34.9 % (ref 32–34.5)
MCV RBC AUTO: 91.9 FL (ref 80–99.9)
MONOCYTES ABSOLUTE: 1.27 E9/L (ref 0.1–0.95)
MONOCYTES RELATIVE PERCENT: 8.5 % (ref 2–12)
NEUTROPHILS ABSOLUTE: 11.04 E9/L (ref 1.8–7.3)
NEUTROPHILS RELATIVE PERCENT: 74.3 % (ref 43–80)
NITRITE, URINE: NEGATIVE
PDW BLD-RTO: 13.1 FL (ref 11.5–15)
PH UA: 6 (ref 5–9)
PLATELET # BLD: 190 E9/L (ref 130–450)
PMV BLD AUTO: 10 FL (ref 7–12)
PROTEIN UA: NEGATIVE MG/DL
RBC # BLD: 3.83 E12/L (ref 3.5–5.5)
RBC UA: ABNORMAL /HPF (ref 0–2)
RENAL EPITHELIAL, UA: ABNORMAL /HPF
SPECIFIC GRAVITY UA: <=1.005 (ref 1–1.03)
UROBILINOGEN, URINE: 0.2 E.U./DL
WBC # BLD: 14.9 E9/L (ref 4.5–11.5)
WBC UA: >20 /HPF (ref 0–5)

## 2019-02-09 PROCEDURE — 96374 THER/PROPH/DIAG INJ IV PUSH: CPT

## 2019-02-09 PROCEDURE — 36415 COLL VENOUS BLD VENIPUNCTURE: CPT

## 2019-02-09 PROCEDURE — 81001 URINALYSIS AUTO W/SCOPE: CPT

## 2019-02-09 PROCEDURE — G0378 HOSPITAL OBSERVATION PER HR: HCPCS

## 2019-02-09 PROCEDURE — G0379 DIRECT REFER HOSPITAL OBSERV: HCPCS

## 2019-02-09 PROCEDURE — 2580000003 HC RX 258: Performed by: OBSTETRICS & GYNECOLOGY

## 2019-02-09 PROCEDURE — 99213 OFFICE O/P EST LOW 20 MIN: CPT | Performed by: OBSTETRICS & GYNECOLOGY

## 2019-02-09 PROCEDURE — 85025 COMPLETE CBC W/AUTO DIFF WBC: CPT

## 2019-02-09 RX ORDER — SODIUM CHLORIDE, SODIUM LACTATE, POTASSIUM CHLORIDE, CALCIUM CHLORIDE 600; 310; 30; 20 MG/100ML; MG/100ML; MG/100ML; MG/100ML
INJECTION, SOLUTION INTRAVENOUS CONTINUOUS
Status: DISCONTINUED | OUTPATIENT
Start: 2019-02-09 | End: 2019-02-10 | Stop reason: SDUPTHER

## 2019-02-09 RX ADMIN — SODIUM CHLORIDE, POTASSIUM CHLORIDE, SODIUM LACTATE AND CALCIUM CHLORIDE: 600; 310; 30; 20 INJECTION, SOLUTION INTRAVENOUS at 22:42

## 2019-02-10 ENCOUNTER — ANESTHESIA EVENT (OUTPATIENT)
Dept: LABOR AND DELIVERY | Age: 24
End: 2019-02-10
Payer: COMMERCIAL

## 2019-02-10 ENCOUNTER — ANESTHESIA (OUTPATIENT)
Dept: LABOR AND DELIVERY | Age: 24
End: 2019-02-10
Payer: COMMERCIAL

## 2019-02-10 ENCOUNTER — APPOINTMENT (OUTPATIENT)
Dept: ULTRASOUND IMAGING | Age: 24
End: 2019-02-10
Payer: COMMERCIAL

## 2019-02-10 VITALS — DIASTOLIC BLOOD PRESSURE: 58 MMHG | OXYGEN SATURATION: 99 % | SYSTOLIC BLOOD PRESSURE: 107 MMHG

## 2019-02-10 LAB
ABO/RH: NORMAL
ANTIBODY SCREEN: NORMAL
APTT: 21.6 SEC (ref 24.5–35.1)
APTT: <20 SEC (ref 24.5–35.1)
BASOPHILS ABSOLUTE: 0 E9/L (ref 0–0.2)
BASOPHILS RELATIVE PERCENT: 0 % (ref 0–2)
D DIMER: 3455 NG/ML DDU
D DIMER: 4070 NG/ML DDU
EOSINOPHILS ABSOLUTE: 0 E9/L (ref 0.05–0.5)
EOSINOPHILS RELATIVE PERCENT: 0 % (ref 0–6)
FIBRINOGEN: 692 MG/DL (ref 225–540)
HCT VFR BLD CALC: 23.6 % (ref 34–48)
HCT VFR BLD CALC: 25.7 % (ref 34–48)
HCT VFR BLD CALC: 29.2 % (ref 34–48)
HEMOGLOBIN: 10 G/DL (ref 11.5–15.5)
HEMOGLOBIN: 8.2 G/DL (ref 11.5–15.5)
HEMOGLOBIN: 9 G/DL (ref 11.5–15.5)
INR BLD: 1.1
INR BLD: 1.2
LYMPHOCYTES ABSOLUTE: 2.06 E9/L (ref 1.5–4)
LYMPHOCYTES RELATIVE PERCENT: 9.5 % (ref 20–42)
MCH RBC QN AUTO: 32.4 PG (ref 26–35)
MCHC RBC AUTO-ENTMCNC: 34.7 % (ref 32–34.5)
MCV RBC AUTO: 93.3 FL (ref 80–99.9)
MONOCYTES ABSOLUTE: 0.21 E9/L (ref 0.1–0.95)
MONOCYTES RELATIVE PERCENT: 0.9 % (ref 2–12)
NEUTROPHILS ABSOLUTE: 18.54 E9/L (ref 1.8–7.3)
NEUTROPHILS RELATIVE PERCENT: 89.6 % (ref 43–80)
NUCLEATED RED BLOOD CELLS: 0 /100 WBC
PDW BLD-RTO: 13.3 FL (ref 11.5–15)
PLATELET # BLD: 154 E9/L (ref 130–450)
PMV BLD AUTO: 10 FL (ref 7–12)
PROTHROMBIN TIME: 12.5 SEC (ref 9.3–12.4)
PROTHROMBIN TIME: 13.3 SEC (ref 9.3–12.4)
RBC # BLD: 2.53 E12/L (ref 3.5–5.5)
WBC # BLD: 20.6 E9/L (ref 4.5–11.5)

## 2019-02-10 PROCEDURE — 3700000000 HC ANESTHESIA ATTENDED CARE: Performed by: OBSTETRICS & GYNECOLOGY

## 2019-02-10 PROCEDURE — 86901 BLOOD TYPING SEROLOGIC RH(D): CPT

## 2019-02-10 PROCEDURE — 3700000025 ANESTHESIA EPIDURAL BLOCK: Performed by: ANESTHESIOLOGY

## 2019-02-10 PROCEDURE — 88307 TISSUE EXAM BY PATHOLOGIST: CPT

## 2019-02-10 PROCEDURE — 86923 COMPATIBILITY TEST ELECTRIC: CPT

## 2019-02-10 PROCEDURE — 3600000012 HC SURGERY LEVEL 2 ADDTL 15MIN: Performed by: OBSTETRICS & GYNECOLOGY

## 2019-02-10 PROCEDURE — 6360000002 HC RX W HCPCS

## 2019-02-10 PROCEDURE — 85025 COMPLETE CBC W/AUTO DIFF WBC: CPT

## 2019-02-10 PROCEDURE — 6360000002 HC RX W HCPCS: Performed by: NURSE ANESTHETIST, CERTIFIED REGISTERED

## 2019-02-10 PROCEDURE — 85384 FIBRINOGEN ACTIVITY: CPT

## 2019-02-10 PROCEDURE — 2580000003 HC RX 258: Performed by: OBSTETRICS & GYNECOLOGY

## 2019-02-10 PROCEDURE — 6370000000 HC RX 637 (ALT 250 FOR IP): Performed by: OBSTETRICS & GYNECOLOGY

## 2019-02-10 PROCEDURE — 36415 COLL VENOUS BLD VENIPUNCTURE: CPT

## 2019-02-10 PROCEDURE — 99253 IP/OBS CNSLTJ NEW/EST LOW 45: CPT | Performed by: INTERNAL MEDICINE

## 2019-02-10 PROCEDURE — 7100000000 HC PACU RECOVERY - FIRST 15 MIN: Performed by: OBSTETRICS & GYNECOLOGY

## 2019-02-10 PROCEDURE — 85018 HEMOGLOBIN: CPT

## 2019-02-10 PROCEDURE — 1220000001 HC SEMI PRIVATE L&D R&B

## 2019-02-10 PROCEDURE — 86850 RBC ANTIBODY SCREEN: CPT

## 2019-02-10 PROCEDURE — 3600000002 HC SURGERY LEVEL 2 BASE: Performed by: OBSTETRICS & GYNECOLOGY

## 2019-02-10 PROCEDURE — 85610 PROTHROMBIN TIME: CPT

## 2019-02-10 PROCEDURE — 2709999900 HC NON-CHARGEABLE SUPPLY: Performed by: OBSTETRICS & GYNECOLOGY

## 2019-02-10 PROCEDURE — 2500000003 HC RX 250 WO HCPCS: Performed by: ANESTHESIOLOGY

## 2019-02-10 PROCEDURE — 85730 THROMBOPLASTIN TIME PARTIAL: CPT

## 2019-02-10 PROCEDURE — 2500000003 HC RX 250 WO HCPCS: Performed by: NURSE ANESTHETIST, CERTIFIED REGISTERED

## 2019-02-10 PROCEDURE — 3700000001 HC ADD 15 MINUTES (ANESTHESIA): Performed by: OBSTETRICS & GYNECOLOGY

## 2019-02-10 PROCEDURE — 7100000001 HC PACU RECOVERY - ADDTL 15 MIN: Performed by: OBSTETRICS & GYNECOLOGY

## 2019-02-10 PROCEDURE — 7200000001 HC VAGINAL DELIVERY

## 2019-02-10 PROCEDURE — 51701 INSERT BLADDER CATHETER: CPT

## 2019-02-10 PROCEDURE — 0UQMXZZ REPAIR VULVA, EXTERNAL APPROACH: ICD-10-PCS | Performed by: OBSTETRICS & GYNECOLOGY

## 2019-02-10 PROCEDURE — 86900 BLOOD TYPING SEROLOGIC ABO: CPT

## 2019-02-10 PROCEDURE — 93970 EXTREMITY STUDY: CPT

## 2019-02-10 PROCEDURE — 85378 FIBRIN DEGRADE SEMIQUANT: CPT

## 2019-02-10 PROCEDURE — 51702 INSERT TEMP BLADDER CATH: CPT

## 2019-02-10 PROCEDURE — 76856 US EXAM PELVIC COMPLETE: CPT

## 2019-02-10 PROCEDURE — 6360000002 HC RX W HCPCS: Performed by: OBSTETRICS & GYNECOLOGY

## 2019-02-10 PROCEDURE — 85014 HEMATOCRIT: CPT

## 2019-02-10 PROCEDURE — 0UQG0ZZ REPAIR VAGINA, OPEN APPROACH: ICD-10-PCS | Performed by: OBSTETRICS & GYNECOLOGY

## 2019-02-10 RX ORDER — ACETAMINOPHEN 325 MG/1
650 TABLET ORAL EVERY 4 HOURS PRN
Status: DISCONTINUED | OUTPATIENT
Start: 2019-02-10 | End: 2019-02-12 | Stop reason: HOSPADM

## 2019-02-10 RX ORDER — LANOLIN 100 %
OINTMENT (GRAM) TOPICAL
Status: DISCONTINUED | OUTPATIENT
Start: 2019-02-10 | End: 2019-02-12 | Stop reason: HOSPADM

## 2019-02-10 RX ORDER — NALOXONE HYDROCHLORIDE 0.4 MG/ML
0.4 INJECTION, SOLUTION INTRAMUSCULAR; INTRAVENOUS; SUBCUTANEOUS PRN
Status: DISCONTINUED | OUTPATIENT
Start: 2019-02-10 | End: 2019-02-10

## 2019-02-10 RX ORDER — HYDROCODONE BITARTRATE AND ACETAMINOPHEN 5; 325 MG/1; MG/1
2 TABLET ORAL EVERY 4 HOURS PRN
Status: DISCONTINUED | OUTPATIENT
Start: 2019-02-10 | End: 2019-02-12 | Stop reason: HOSPADM

## 2019-02-10 RX ORDER — ACETAMINOPHEN 650 MG
TABLET, EXTENDED RELEASE ORAL
Status: COMPLETED
Start: 2019-02-10 | End: 2019-02-10

## 2019-02-10 RX ORDER — FERROUS SULFATE 325(65) MG
325 TABLET ORAL
Status: DISCONTINUED | OUTPATIENT
Start: 2019-02-10 | End: 2019-02-12 | Stop reason: HOSPADM

## 2019-02-10 RX ORDER — SODIUM CHLORIDE 0.9 % (FLUSH) 0.9 %
10 SYRINGE (ML) INJECTION PRN
Status: DISCONTINUED | OUTPATIENT
Start: 2019-02-10 | End: 2019-02-12 | Stop reason: HOSPADM

## 2019-02-10 RX ORDER — PANTOPRAZOLE SODIUM 40 MG/1
40 TABLET, DELAYED RELEASE ORAL DAILY PRN
Status: DISCONTINUED | OUTPATIENT
Start: 2019-02-10 | End: 2019-02-12 | Stop reason: HOSPADM

## 2019-02-10 RX ORDER — ONDANSETRON 4 MG/1
4 TABLET, FILM COATED ORAL EVERY 6 HOURS PRN
Status: DISCONTINUED | OUTPATIENT
Start: 2019-02-10 | End: 2019-02-12 | Stop reason: HOSPADM

## 2019-02-10 RX ORDER — 0.9 % SODIUM CHLORIDE 0.9 %
250 INTRAVENOUS SOLUTION INTRAVENOUS ONCE
Status: DISCONTINUED | OUTPATIENT
Start: 2019-02-10 | End: 2019-02-10

## 2019-02-10 RX ORDER — ONDANSETRON 2 MG/ML
4 INJECTION INTRAMUSCULAR; INTRAVENOUS EVERY 6 HOURS PRN
Status: DISCONTINUED | OUTPATIENT
Start: 2019-02-10 | End: 2019-02-10

## 2019-02-10 RX ORDER — SODIUM CHLORIDE 0.9 % (FLUSH) 0.9 %
10 SYRINGE (ML) INJECTION EVERY 12 HOURS SCHEDULED
Status: DISCONTINUED | OUTPATIENT
Start: 2019-02-10 | End: 2019-02-12 | Stop reason: HOSPADM

## 2019-02-10 RX ORDER — SODIUM CHLORIDE, SODIUM LACTATE, POTASSIUM CHLORIDE, CALCIUM CHLORIDE 600; 310; 30; 20 MG/100ML; MG/100ML; MG/100ML; MG/100ML
INJECTION, SOLUTION INTRAVENOUS CONTINUOUS
Status: DISCONTINUED | OUTPATIENT
Start: 2019-02-10 | End: 2019-02-12 | Stop reason: HOSPADM

## 2019-02-10 RX ORDER — DOCUSATE SODIUM 100 MG/1
100 CAPSULE, LIQUID FILLED ORAL 2 TIMES DAILY
Status: DISCONTINUED | OUTPATIENT
Start: 2019-02-10 | End: 2019-02-12 | Stop reason: HOSPADM

## 2019-02-10 RX ORDER — FENTANYL CITRATE 50 UG/ML
INJECTION, SOLUTION INTRAMUSCULAR; INTRAVENOUS PRN
Status: DISCONTINUED | OUTPATIENT
Start: 2019-02-10 | End: 2019-02-10 | Stop reason: SDUPTHER

## 2019-02-10 RX ORDER — LIDOCAINE HYDROCHLORIDE 10 MG/ML
INJECTION, SOLUTION INFILTRATION; PERINEURAL
Status: COMPLETED
Start: 2019-02-10 | End: 2019-02-10

## 2019-02-10 RX ORDER — SIMETHICONE 80 MG
80 TABLET,CHEWABLE ORAL EVERY 6 HOURS PRN
Status: DISCONTINUED | OUTPATIENT
Start: 2019-02-10 | End: 2019-02-12 | Stop reason: HOSPADM

## 2019-02-10 RX ORDER — METHYLERGONOVINE MALEATE 0.2 MG/ML
200 INJECTION INTRAVENOUS ONCE
Status: COMPLETED | OUTPATIENT
Start: 2019-02-10 | End: 2019-02-10

## 2019-02-10 RX ORDER — ASPIRIN 81 MG/1
81 TABLET, CHEWABLE ORAL DAILY
Status: DISCONTINUED | OUTPATIENT
Start: 2019-02-11 | End: 2019-02-12 | Stop reason: HOSPADM

## 2019-02-10 RX ORDER — FOLIC ACID 1 MG/1
1 TABLET ORAL DAILY
Status: DISCONTINUED | OUTPATIENT
Start: 2019-02-11 | End: 2019-02-12 | Stop reason: HOSPADM

## 2019-02-10 RX ORDER — PRENATAL WITH FERROUS FUM AND FOLIC ACID 3080; 920; 120; 400; 22; 1.84; 3; 20; 10; 1; 12; 200; 27; 25; 2 [IU]/1; [IU]/1; MG/1; [IU]/1; MG/1; MG/1; MG/1; MG/1; MG/1; MG/1; UG/1; MG/1; MG/1; MG/1; MG/1
1 TABLET ORAL DAILY
Status: DISCONTINUED | OUTPATIENT
Start: 2019-02-10 | End: 2019-02-12 | Stop reason: HOSPADM

## 2019-02-10 RX ORDER — LIDOCAINE HYDROCHLORIDE 20 MG/ML
INJECTION, SOLUTION EPIDURAL; INFILTRATION; INTRACAUDAL; PERINEURAL PRN
Status: DISCONTINUED | OUTPATIENT
Start: 2019-02-10 | End: 2019-02-10 | Stop reason: SDUPTHER

## 2019-02-10 RX ORDER — NALBUPHINE HCL 10 MG/ML
5 AMPUL (ML) INJECTION EVERY 4 HOURS PRN
Status: DISCONTINUED | OUTPATIENT
Start: 2019-02-10 | End: 2019-02-10

## 2019-02-10 RX ORDER — METHYLERGONOVINE MALEATE 0.2 MG/ML
INJECTION INTRAVENOUS
Status: DISPENSED
Start: 2019-02-10 | End: 2019-02-11

## 2019-02-10 RX ORDER — BISACODYL 10 MG
10 SUPPOSITORY, RECTAL RECTAL DAILY PRN
Status: DISCONTINUED | OUTPATIENT
Start: 2019-02-10 | End: 2019-02-12 | Stop reason: HOSPADM

## 2019-02-10 RX ORDER — LIDOCAINE HYDROCHLORIDE 10 MG/ML
INJECTION, SOLUTION INFILTRATION; PERINEURAL
Status: DISCONTINUED
Start: 2019-02-10 | End: 2019-02-10

## 2019-02-10 RX ORDER — LEVETIRACETAM 500 MG/1
1000 TABLET, EXTENDED RELEASE ORAL 2 TIMES DAILY
Status: DISCONTINUED | OUTPATIENT
Start: 2019-02-10 | End: 2019-02-12 | Stop reason: HOSPADM

## 2019-02-10 RX ORDER — HYDROCODONE BITARTRATE AND ACETAMINOPHEN 5; 325 MG/1; MG/1
1 TABLET ORAL EVERY 4 HOURS PRN
Status: DISCONTINUED | OUTPATIENT
Start: 2019-02-10 | End: 2019-02-12 | Stop reason: HOSPADM

## 2019-02-10 RX ORDER — IBUPROFEN 800 MG/1
800 TABLET ORAL EVERY 8 HOURS PRN
Status: DISCONTINUED | OUTPATIENT
Start: 2019-02-10 | End: 2019-02-12 | Stop reason: HOSPADM

## 2019-02-10 RX ADMIN — BUTORPHANOL TARTRATE 2 MG: 2 INJECTION, SOLUTION INTRAMUSCULAR; INTRAVENOUS at 02:56

## 2019-02-10 RX ADMIN — Medication 2 MG: at 02:56

## 2019-02-10 RX ADMIN — BENZOCAINE AND LEVOMENTHOL: 200; 5 SPRAY TOPICAL at 23:52

## 2019-02-10 RX ADMIN — Medication 15 ML/HR: at 11:24

## 2019-02-10 RX ADMIN — METHYLERGONOVINE MALEATE 200 MCG: 0.2 INJECTION INTRAVENOUS at 12:28

## 2019-02-10 RX ADMIN — Medication 5 ML: at 11:15

## 2019-02-10 RX ADMIN — LEVETIRACETAM 1000 MG: 500 TABLET, EXTENDED RELEASE ORAL at 20:49

## 2019-02-10 RX ADMIN — FERROUS SULFATE TAB 325 MG (65 MG ELEMENTAL FE) 325 MG: 325 (65 FE) TAB at 17:03

## 2019-02-10 RX ADMIN — SODIUM CHLORIDE, POTASSIUM CHLORIDE, SODIUM LACTATE AND CALCIUM CHLORIDE: 600; 310; 30; 20 INJECTION, SOLUTION INTRAVENOUS at 23:52

## 2019-02-10 RX ADMIN — SODIUM CHLORIDE, POTASSIUM CHLORIDE, SODIUM LACTATE AND CALCIUM CHLORIDE: 600; 310; 30; 20 INJECTION, SOLUTION INTRAVENOUS at 11:18

## 2019-02-10 RX ADMIN — Medication 5 ML: at 11:20

## 2019-02-10 RX ADMIN — Medication 999 MILLI-UNITS/MIN: at 12:20

## 2019-02-10 RX ADMIN — Medication: at 13:45

## 2019-02-10 RX ADMIN — FENTANYL CITRATE 50 MCG: 50 INJECTION, SOLUTION INTRAMUSCULAR; INTRAVENOUS at 13:38

## 2019-02-10 RX ADMIN — DOCUSATE SODIUM 100 MG: 100 CAPSULE, LIQUID FILLED ORAL at 20:49

## 2019-02-10 RX ADMIN — Medication: at 12:10

## 2019-02-10 RX ADMIN — LEVETIRACETAM 1000 MG: 500 TABLET, EXTENDED RELEASE ORAL at 15:06

## 2019-02-10 RX ADMIN — SODIUM CHLORIDE, POTASSIUM CHLORIDE, SODIUM LACTATE AND CALCIUM CHLORIDE: 600; 310; 30; 20 INJECTION, SOLUTION INTRAVENOUS at 16:34

## 2019-02-10 RX ADMIN — Medication 1 TABLET: at 17:03

## 2019-02-10 RX ADMIN — Medication 5 ML: at 11:24

## 2019-02-10 RX ADMIN — IBUPROFEN 800 MG: 800 TABLET, FILM COATED ORAL at 19:41

## 2019-02-10 RX ADMIN — LIDOCAINE HYDROCHLORIDE 5 ML: 20 INJECTION, SOLUTION EPIDURAL; INFILTRATION; INTRACAUDAL; PERINEURAL at 13:38

## 2019-02-10 RX ADMIN — SODIUM CHLORIDE, POTASSIUM CHLORIDE, SODIUM LACTATE AND CALCIUM CHLORIDE: 600; 310; 30; 20 INJECTION, SOLUTION INTRAVENOUS at 08:05

## 2019-02-10 ASSESSMENT — PULMONARY FUNCTION TESTS
PIF_VALUE: 0

## 2019-02-10 ASSESSMENT — PAIN SCALES - GENERAL
PAINLEVEL_OUTOF10: 8
PAINLEVEL_OUTOF10: 3

## 2019-02-11 LAB
HCT VFR BLD CALC: 21.2 % (ref 34–48)
HEMOGLOBIN: 7.1 G/DL (ref 11.5–15.5)

## 2019-02-11 PROCEDURE — 6370000000 HC RX 637 (ALT 250 FOR IP): Performed by: OBSTETRICS & GYNECOLOGY

## 2019-02-11 PROCEDURE — 1220000000 HC SEMI PRIVATE OB R&B

## 2019-02-11 PROCEDURE — 99233 SBSQ HOSP IP/OBS HIGH 50: CPT | Performed by: INTERNAL MEDICINE

## 2019-02-11 PROCEDURE — 85018 HEMOGLOBIN: CPT

## 2019-02-11 PROCEDURE — 2580000003 HC RX 258: Performed by: OBSTETRICS & GYNECOLOGY

## 2019-02-11 PROCEDURE — 85014 HEMATOCRIT: CPT

## 2019-02-11 PROCEDURE — 36415 COLL VENOUS BLD VENIPUNCTURE: CPT

## 2019-02-11 PROCEDURE — 6360000002 HC RX W HCPCS: Performed by: OBSTETRICS & GYNECOLOGY

## 2019-02-11 RX ADMIN — FOLIC ACID 1 MG: 1 TABLET ORAL at 10:52

## 2019-02-11 RX ADMIN — FERROUS SULFATE TAB 325 MG (65 MG ELEMENTAL FE) 325 MG: 325 (65 FE) TAB at 18:34

## 2019-02-11 RX ADMIN — IBUPROFEN 800 MG: 800 TABLET, FILM COATED ORAL at 12:14

## 2019-02-11 RX ADMIN — IBUPROFEN 800 MG: 800 TABLET, FILM COATED ORAL at 03:59

## 2019-02-11 RX ADMIN — FERROUS SULFATE TAB 325 MG (65 MG ELEMENTAL FE) 325 MG: 325 (65 FE) TAB at 10:52

## 2019-02-11 RX ADMIN — Medication 10 ML: at 10:56

## 2019-02-11 RX ADMIN — ENOXAPARIN SODIUM 40 MG: 40 INJECTION SUBCUTANEOUS at 10:52

## 2019-02-11 RX ADMIN — BENZOCAINE AND LEVOMENTHOL: 200; 5 SPRAY TOPICAL at 06:34

## 2019-02-11 RX ADMIN — ACETAMINOPHEN 650 MG: 325 TABLET ORAL at 18:34

## 2019-02-11 RX ADMIN — ASPIRIN 81 MG: 81 TABLET, CHEWABLE ORAL at 10:52

## 2019-02-11 RX ADMIN — BENZOCAINE AND LEVOMENTHOL: 200; 5 SPRAY TOPICAL at 03:59

## 2019-02-11 RX ADMIN — DOCUSATE SODIUM 100 MG: 100 CAPSULE, LIQUID FILLED ORAL at 10:51

## 2019-02-11 RX ADMIN — Medication 1 TABLET: at 10:52

## 2019-02-11 RX ADMIN — LEVETIRACETAM 1000 MG: 500 TABLET, EXTENDED RELEASE ORAL at 10:51

## 2019-02-11 RX ADMIN — IBUPROFEN 800 MG: 800 TABLET, FILM COATED ORAL at 20:52

## 2019-02-11 RX ADMIN — SODIUM CHLORIDE, POTASSIUM CHLORIDE, SODIUM LACTATE AND CALCIUM CHLORIDE: 600; 310; 30; 20 INJECTION, SOLUTION INTRAVENOUS at 06:34

## 2019-02-11 RX ADMIN — DOCUSATE SODIUM 100 MG: 100 CAPSULE, LIQUID FILLED ORAL at 20:52

## 2019-02-11 RX ADMIN — LEVETIRACETAM 1000 MG: 500 TABLET, EXTENDED RELEASE ORAL at 20:52

## 2019-02-11 ASSESSMENT — PAIN SCALES - GENERAL
PAINLEVEL_OUTOF10: 4
PAINLEVEL_OUTOF10: 0
PAINLEVEL_OUTOF10: 3
PAINLEVEL_OUTOF10: 0
PAINLEVEL_OUTOF10: 3
PAINLEVEL_OUTOF10: 3

## 2019-02-11 ASSESSMENT — PAIN DESCRIPTION - RADICULAR PAIN: RADICULAR_PAIN: ABSENT

## 2019-02-12 VITALS
DIASTOLIC BLOOD PRESSURE: 57 MMHG | WEIGHT: 162 LBS | HEART RATE: 86 BPM | OXYGEN SATURATION: 98 % | TEMPERATURE: 97.9 F | RESPIRATION RATE: 16 BRPM | HEIGHT: 60 IN | SYSTOLIC BLOOD PRESSURE: 101 MMHG | BODY MASS INDEX: 31.8 KG/M2

## 2019-02-12 LAB
BASOPHILS ABSOLUTE: 0.04 E9/L (ref 0–0.2)
BASOPHILS RELATIVE PERCENT: 0.3 % (ref 0–2)
EOSINOPHILS ABSOLUTE: 0.12 E9/L (ref 0.05–0.5)
EOSINOPHILS RELATIVE PERCENT: 1 % (ref 0–6)
HCT VFR BLD CALC: 21.5 % (ref 34–48)
HEMOGLOBIN: 7.2 G/DL (ref 11.5–15.5)
IMMATURE GRANULOCYTES #: 0.22 E9/L
IMMATURE GRANULOCYTES %: 1.8 % (ref 0–5)
LYMPHOCYTES ABSOLUTE: 1.8 E9/L (ref 1.5–4)
LYMPHOCYTES RELATIVE PERCENT: 14.3 % (ref 20–42)
MCH RBC QN AUTO: 31.9 PG (ref 26–35)
MCHC RBC AUTO-ENTMCNC: 33.5 % (ref 32–34.5)
MCV RBC AUTO: 95.1 FL (ref 80–99.9)
MONOCYTES ABSOLUTE: 0.52 E9/L (ref 0.1–0.95)
MONOCYTES RELATIVE PERCENT: 4.1 % (ref 2–12)
NEUTROPHILS ABSOLUTE: 9.85 E9/L (ref 1.8–7.3)
NEUTROPHILS RELATIVE PERCENT: 78.5 % (ref 43–80)
PDW BLD-RTO: 13.9 FL (ref 11.5–15)
PLATELET # BLD: 155 E9/L (ref 130–450)
PMV BLD AUTO: 9.6 FL (ref 7–12)
RBC # BLD: 2.26 E12/L (ref 3.5–5.5)
WBC # BLD: 12.6 E9/L (ref 4.5–11.5)

## 2019-02-12 PROCEDURE — 99232 SBSQ HOSP IP/OBS MODERATE 35: CPT | Performed by: INTERNAL MEDICINE

## 2019-02-12 PROCEDURE — 36415 COLL VENOUS BLD VENIPUNCTURE: CPT

## 2019-02-12 PROCEDURE — 85025 COMPLETE CBC W/AUTO DIFF WBC: CPT

## 2019-02-12 PROCEDURE — 6370000000 HC RX 637 (ALT 250 FOR IP): Performed by: OBSTETRICS & GYNECOLOGY

## 2019-02-12 PROCEDURE — 6360000002 HC RX W HCPCS: Performed by: OBSTETRICS & GYNECOLOGY

## 2019-02-12 RX ORDER — IBUPROFEN 800 MG/1
800 TABLET ORAL EVERY 8 HOURS PRN
Qty: 30 TABLET | Refills: 0 | Status: SHIPPED | OUTPATIENT
Start: 2019-02-12 | End: 2020-03-19

## 2019-02-12 RX ORDER — HYDROCODONE BITARTRATE AND ACETAMINOPHEN 5; 325 MG/1; MG/1
1 TABLET ORAL EVERY 6 HOURS PRN
Qty: 20 TABLET | Refills: 0 | Status: SHIPPED | OUTPATIENT
Start: 2019-02-12 | End: 2019-02-17

## 2019-02-12 RX ADMIN — FOLIC ACID 1 MG: 1 TABLET ORAL at 09:58

## 2019-02-12 RX ADMIN — FERROUS SULFATE TAB 325 MG (65 MG ELEMENTAL FE) 325 MG: 325 (65 FE) TAB at 13:04

## 2019-02-12 RX ADMIN — BENZOCAINE AND LEVOMENTHOL: 200; 5 SPRAY TOPICAL at 10:23

## 2019-02-12 RX ADMIN — FERROUS SULFATE TAB 325 MG (65 MG ELEMENTAL FE) 325 MG: 325 (65 FE) TAB at 09:58

## 2019-02-12 RX ADMIN — ENOXAPARIN SODIUM 40 MG: 40 INJECTION SUBCUTANEOUS at 09:05

## 2019-02-12 RX ADMIN — IBUPROFEN 800 MG: 800 TABLET, FILM COATED ORAL at 05:05

## 2019-02-12 RX ADMIN — DOCUSATE SODIUM 100 MG: 100 CAPSULE, LIQUID FILLED ORAL at 09:58

## 2019-02-12 RX ADMIN — IBUPROFEN 800 MG: 800 TABLET, FILM COATED ORAL at 13:02

## 2019-02-12 RX ADMIN — Medication 1 TABLET: at 09:58

## 2019-02-12 RX ADMIN — ASPIRIN 81 MG: 81 TABLET, CHEWABLE ORAL at 09:58

## 2019-02-12 RX ADMIN — LEVETIRACETAM 1000 MG: 500 TABLET, EXTENDED RELEASE ORAL at 09:59

## 2019-02-12 ASSESSMENT — PAIN SCALES - GENERAL
PAINLEVEL_OUTOF10: 3
PAINLEVEL_OUTOF10: 5

## 2019-02-13 LAB
BLOOD BANK DISPENSE STATUS: NORMAL
BLOOD BANK DISPENSE STATUS: NORMAL
BLOOD BANK PRODUCT CODE: NORMAL
BLOOD BANK PRODUCT CODE: NORMAL
BPU ID: NORMAL
BPU ID: NORMAL
DESCRIPTION BLOOD BANK: NORMAL
DESCRIPTION BLOOD BANK: NORMAL
Lab: NORMAL
REPORT: NORMAL
THIS TEST SENT TO: NORMAL

## 2019-02-27 ENCOUNTER — HOSPITAL ENCOUNTER (OUTPATIENT)
Age: 24
Discharge: HOME | End: 2019-02-27
Payer: COMMERCIAL

## 2019-02-27 LAB
ALBUMIN SERPL-MCNC: 4 G/DL (ref 3.5–5.2)
ALP BLD-CCNC: 106 U/L (ref 35–104)
ALT SERPL-CCNC: 20 U/L (ref 0–32)
ANION GAP SERPL CALCULATED.3IONS-SCNC: 10 MMOL/L (ref 7–16)
AST SERPL-CCNC: 16 U/L (ref 0–31)
BASOPHILS ABSOLUTE: 0.07 E9/L (ref 0–0.2)
BASOPHILS RELATIVE PERCENT: 1.1 % (ref 0–2)
BILIRUB SERPL-MCNC: <0.2 MG/DL (ref 0–1.2)
BUN BLDV-MCNC: 14 MG/DL (ref 6–20)
CALCIUM SERPL-MCNC: 9.3 MG/DL (ref 8.6–10.2)
CHLORIDE BLD-SCNC: 99 MMOL/L (ref 98–107)
CO2: 27 MMOL/L (ref 22–29)
CREAT SERPL-MCNC: 0.5 MG/DL (ref 0.5–1)
EOSINOPHILS ABSOLUTE: 0.2 E9/L (ref 0.05–0.5)
EOSINOPHILS RELATIVE PERCENT: 3.3 % (ref 0–6)
GFR AFRICAN AMERICAN: >60
GFR NON-AFRICAN AMERICAN: >60 ML/MIN/1.73
GLUCOSE BLD-MCNC: 75 MG/DL (ref 74–99)
HCT VFR BLD CALC: 36.5 % (ref 34–48)
HEMOGLOBIN: 11.6 G/DL (ref 11.5–15.5)
IMMATURE GRANULOCYTES #: 0.03 E9/L
IMMATURE GRANULOCYTES %: 0.5 % (ref 0–5)
LYMPHOCYTES ABSOLUTE: 1.72 E9/L (ref 1.5–4)
LYMPHOCYTES RELATIVE PERCENT: 28 % (ref 20–42)
MCH RBC QN AUTO: 31.2 PG (ref 26–35)
MCHC RBC AUTO-ENTMCNC: 31.8 % (ref 32–34.5)
MCV RBC AUTO: 98.1 FL (ref 80–99.9)
MONOCYTES ABSOLUTE: 0.45 E9/L (ref 0.1–0.95)
MONOCYTES RELATIVE PERCENT: 7.3 % (ref 2–12)
NEUTROPHILS ABSOLUTE: 3.67 E9/L (ref 1.8–7.3)
NEUTROPHILS RELATIVE PERCENT: 59.8 % (ref 43–80)
PDW BLD-RTO: 14.9 FL (ref 11.5–15)
PLATELET # BLD: 339 E9/L (ref 130–450)
PMV BLD AUTO: 8.9 FL (ref 7–12)
POTASSIUM SERPL-SCNC: 4.1 MMOL/L (ref 3.5–5)
RBC # BLD: 3.72 E12/L (ref 3.5–5.5)
SODIUM BLD-SCNC: 136 MMOL/L (ref 132–146)
TOTAL PROTEIN: 6.7 G/DL (ref 6.4–8.3)
WBC # BLD: 6.1 E9/L (ref 4.5–11.5)

## 2019-02-27 PROCEDURE — 85025 COMPLETE CBC W/AUTO DIFF WBC: CPT

## 2019-02-27 PROCEDURE — 80053 COMPREHEN METABOLIC PANEL: CPT

## 2019-02-27 PROCEDURE — 36415 COLL VENOUS BLD VENIPUNCTURE: CPT

## 2019-03-07 ENCOUNTER — HOSPITAL ENCOUNTER (OUTPATIENT)
Age: 24
Discharge: HOME | End: 2019-03-07
Payer: COMMERCIAL

## 2019-03-07 PROCEDURE — 85300 ANTITHROMBIN III ACTIVITY: CPT

## 2019-03-07 PROCEDURE — 36415 COLL VENOUS BLD VENIPUNCTURE: CPT

## 2019-03-07 PROCEDURE — 85613 RUSSELL VIPER VENOM DILUTED: CPT

## 2019-03-07 PROCEDURE — 86146 BETA-2 GLYCOPROTEIN ANTIBODY: CPT

## 2019-03-09 LAB
AT-III ACTIVITY: 110 % ACTIVITY (ref 83–121)
LUPUS ANTICOAG DVVT: NORMAL

## 2019-03-10 LAB
BETA-2 GLYCOPROTEIN 1 IGG ANTIBODY: 6 SGU (ref 0–20)
BETA-2 GLYCOPROTEIN 1 IGM ANTIBODY: 7 SMU (ref 0–20)

## 2019-05-31 ENCOUNTER — HOSPITAL ENCOUNTER (OUTPATIENT)
Age: 24
Discharge: HOME | End: 2019-06-02
Payer: COMMERCIAL

## 2019-05-31 PROBLEM — N84.2 VAGINAL POLYP: Status: ACTIVE | Noted: 2019-05-31

## 2019-05-31 PROBLEM — A63.0 CONDYLOMA ACUMINATUM: Status: ACTIVE | Noted: 2019-05-31

## 2019-05-31 PROCEDURE — 88305 TISSUE EXAM BY PATHOLOGIST: CPT

## 2019-08-09 ENCOUNTER — OFFICE VISIT (OUTPATIENT)
Dept: NEUROLOGY | Age: 24
End: 2019-08-09
Payer: COMMERCIAL

## 2019-08-09 VITALS
RESPIRATION RATE: 20 BRPM | SYSTOLIC BLOOD PRESSURE: 114 MMHG | DIASTOLIC BLOOD PRESSURE: 76 MMHG | OXYGEN SATURATION: 96 % | WEIGHT: 140 LBS | HEIGHT: 60 IN | BODY MASS INDEX: 27.48 KG/M2 | HEART RATE: 105 BPM

## 2019-08-09 DIAGNOSIS — G40.209 PARTIAL SYMPTOMATIC EPILEPSY WITH COMPLEX PARTIAL SEIZURES, NOT INTRACTABLE, WITHOUT STATUS EPILEPTICUS (HCC): Primary | ICD-10-CM

## 2019-08-09 PROCEDURE — 99214 OFFICE O/P EST MOD 30 MIN: CPT | Performed by: CLINICAL NURSE SPECIALIST

## 2019-08-09 NOTE — PROGRESS NOTES
Russ Enciso is a 30 y.o. right handed woman    Patient reports her neurological difficulties began when she was 13years of age. It was Christmastime and her mother was wrapping presents when suddenly, Godwin Mc let out a loud sound. Her mother when racing into her room to find out what happened and found her displaying GTC movements. She was carried into the car and driven to the hospital. En route to the hospital, she stopped shaking and gradually awakened. She was seen by numerous clinicians and neurologist at that time and underwent copious amounts of testing. 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 She was started on antiepileptic medications at that time and after trialing numerous AEDs -- including generic Keppra and name brand Keppra -- she was started on Keppra XR 1000mg twice daily. She has been on Keppra XR 1000mg BID since she was 15   She has been seizure free since     Still working midnights on the cardiac unit at [employer]    We had her follow with Dr Charanjit Vines upon learning she was pregnant last fall 2018   She suffered 2 miscarriages r/t thrombophilia and has remained on ASA and Lovenox    She delivered well with little issues    Daughter was early (27 weeks) but did not NICU and was discharged on time    Eating well with no developmental issues    All questions answered at this time     No new neuro complaints    Typically obtains the same generic  of Keppra XR    No chest pain or palpitations  No SOB  No vertigo, lightheadedness or loss of consciousness  No falls, tripping or stumbling  No incontinence of bowels or bladder  No itching or bruising appreciated  No numbness, tingling or focal arm/leg weakness    ROS otherwise negative     Prior to Visit Medications    Medication Sig Taking?  Authorizing Provider   estradiol (ESTRACE VAGINAL) 0.1 MG/GM vaginal cream Apply pea size amount vaginally 3 times a week at night time Yes Sara Sun MD   ibuprofen (ADVIL;MOTRIN) 800 MG tablet Take 1 tablet by mouth every 8 hours as needed for Pain Yes Sara Sun MD   levETIRAcetam (KEPPRA XR) 500 MG TB24 extended release tablet Take 2 tablets by mouth 2 times daily Yes LYDIA Joel - CNS   Lubzot-IfVki-AcUgs-Meth-FA-DHA (PRENATE MINI) 18-0.6-0.4-350 MG CAPS Take 1 tablet by mouth daily Yes Sara Sun MD   aspirin 81 MG tablet Take 81 mg by mouth daily Yes Historical Provider, MD   folic acid (FOLVITE) 1 MG tablet Take 1 mg by mouth daily Yes Historical Provider, MD     Allergies as of 08/09/2019    (No Known Allergies)       Objective:     /76 (Site: Left Upper Arm, Position: Sitting, Cuff Size: Medium Adult)   Pulse 105   Resp 20   Ht 5' (1.524 m)   Wt 140 lb (63.5 kg)   SpO2 96%   BMI 27.34 kg/m²      General appearance: alert, appears stated age and cooperative  Head: Normocephalic, without obvious abnormality, atraumatic  Neck: no adenopathy, no carotid bruit and limited ROM  Lungs: clear to auscultation bilaterally  Heart: regular rate and rhythm  Extremities: normal without edema no clubbing or cyanosis   Pulses: 2+ and symmetric  Skin: WDI  no rashes or lesions     Mental Status: Alert, oriented, thought content appropriate    Speech: clear  Language: appropriate     Cranial Nerves:  I: smell    II: visual acuity     II: visual fields Full    II: pupils YUKO   III,VII: ptosis None   III,IV,VI: extraocular muscles  EOMI without nystagmus    V: mastication Normal   V: facial light touch sensation  Normal   V,VII: corneal reflex  Present   VII: facial muscle function - upper     VII: facial muscle function - lower Normal   VIII: hearing Normal   IX: soft palate elevation  Normal   IX,X: gag reflex Present   XI: trapezius strength  5/5   XI: sternocleidomastoid strength 5/5   XI: neck extension strength  5/5   XII: tongue strength  Normal     Motor:  5/5 throughout  Normal bulk and tone

## 2019-09-11 ENCOUNTER — TELEPHONE (OUTPATIENT)
Dept: FAMILY MEDICINE CLINIC | Age: 24
End: 2019-09-11

## 2019-09-11 RX ORDER — AMOXICILLIN 875 MG/1
875 TABLET, COATED ORAL 2 TIMES DAILY
Qty: 20 TABLET | Refills: 0 | Status: SHIPPED | OUTPATIENT
Start: 2019-09-11 | End: 2019-09-21

## 2019-09-19 ENCOUNTER — HOSPITAL ENCOUNTER (OUTPATIENT)
Age: 24
Discharge: HOME | End: 2019-09-21
Payer: COMMERCIAL

## 2019-09-19 ENCOUNTER — OFFICE VISIT (OUTPATIENT)
Dept: FAMILY MEDICINE CLINIC | Age: 24
End: 2019-09-19
Payer: COMMERCIAL

## 2019-09-19 VITALS
OXYGEN SATURATION: 98 % | DIASTOLIC BLOOD PRESSURE: 79 MMHG | HEART RATE: 82 BPM | WEIGHT: 141 LBS | TEMPERATURE: 97.5 F | SYSTOLIC BLOOD PRESSURE: 118 MMHG | BODY MASS INDEX: 27.54 KG/M2

## 2019-09-19 DIAGNOSIS — R53.82 CHRONIC FATIGUE: ICD-10-CM

## 2019-09-19 PROCEDURE — 84439 ASSAY OF FREE THYROXINE: CPT

## 2019-09-19 PROCEDURE — 84443 ASSAY THYROID STIM HORMONE: CPT

## 2019-09-19 PROCEDURE — 80053 COMPREHEN METABOLIC PANEL: CPT

## 2019-09-19 PROCEDURE — 82306 VITAMIN D 25 HYDROXY: CPT

## 2019-09-19 PROCEDURE — 99214 OFFICE O/P EST MOD 30 MIN: CPT | Performed by: FAMILY MEDICINE

## 2019-09-19 PROCEDURE — 85025 COMPLETE CBC W/AUTO DIFF WBC: CPT

## 2019-09-19 RX ORDER — SERTRALINE HYDROCHLORIDE 25 MG/1
25 TABLET, FILM COATED ORAL DAILY
Qty: 30 TABLET | Refills: 5 | Status: SHIPPED | OUTPATIENT
Start: 2019-09-19 | End: 2019-10-22

## 2019-09-19 ASSESSMENT — PATIENT HEALTH QUESTIONNAIRE - PHQ9
SUM OF ALL RESPONSES TO PHQ9 QUESTIONS 1 & 2: 0
SUM OF ALL RESPONSES TO PHQ QUESTIONS 1-9: 0
SUM OF ALL RESPONSES TO PHQ QUESTIONS 1-9: 0
1. LITTLE INTEREST OR PLEASURE IN DOING THINGS: 0
2. FEELING DOWN, DEPRESSED OR HOPELESS: 0

## 2019-09-19 NOTE — PROGRESS NOTES
the chief complaint, HPI, Past Medical, Family and Social History, as well as the above Review of Systems.

## 2019-09-20 DIAGNOSIS — R53.82 CHRONIC FATIGUE: ICD-10-CM

## 2019-09-20 LAB
ALBUMIN SERPL-MCNC: 4.8 G/DL (ref 3.5–5.2)
ALP BLD-CCNC: 85 U/L (ref 35–104)
ALT SERPL-CCNC: 16 U/L (ref 0–32)
ANION GAP SERPL CALCULATED.3IONS-SCNC: 13 MMOL/L (ref 7–16)
AST SERPL-CCNC: 25 U/L (ref 0–31)
BASOPHILS ABSOLUTE: 0.05 E9/L (ref 0–0.2)
BASOPHILS RELATIVE PERCENT: 0.6 % (ref 0–2)
BILIRUB SERPL-MCNC: 0.3 MG/DL (ref 0–1.2)
BUN BLDV-MCNC: 12 MG/DL (ref 6–20)
CALCIUM SERPL-MCNC: 9.7 MG/DL (ref 8.6–10.2)
CHLORIDE BLD-SCNC: 101 MMOL/L (ref 98–107)
CO2: 24 MMOL/L (ref 22–29)
CREAT SERPL-MCNC: 0.4 MG/DL (ref 0.5–1)
EOSINOPHILS ABSOLUTE: 0.14 E9/L (ref 0.05–0.5)
EOSINOPHILS RELATIVE PERCENT: 1.7 % (ref 0–6)
GFR AFRICAN AMERICAN: >60
GFR NON-AFRICAN AMERICAN: >60 ML/MIN/1.73
GLUCOSE BLD-MCNC: 74 MG/DL (ref 74–99)
HCT VFR BLD CALC: 45.5 % (ref 34–48)
HEMOGLOBIN: 14.5 G/DL (ref 11.5–15.5)
IMMATURE GRANULOCYTES #: 0.02 E9/L
IMMATURE GRANULOCYTES %: 0.2 % (ref 0–5)
LYMPHOCYTES ABSOLUTE: 2.23 E9/L (ref 1.5–4)
LYMPHOCYTES RELATIVE PERCENT: 27.8 % (ref 20–42)
MCH RBC QN AUTO: 29.7 PG (ref 26–35)
MCHC RBC AUTO-ENTMCNC: 31.9 % (ref 32–34.5)
MCV RBC AUTO: 93.2 FL (ref 80–99.9)
MONOCYTES ABSOLUTE: 0.55 E9/L (ref 0.1–0.95)
MONOCYTES RELATIVE PERCENT: 6.9 % (ref 2–12)
NEUTROPHILS ABSOLUTE: 5.02 E9/L (ref 1.8–7.3)
NEUTROPHILS RELATIVE PERCENT: 62.8 % (ref 43–80)
PDW BLD-RTO: 12.6 FL (ref 11.5–15)
PLATELET # BLD: 268 E9/L (ref 130–450)
PMV BLD AUTO: 10.1 FL (ref 7–12)
POTASSIUM SERPL-SCNC: 4.2 MMOL/L (ref 3.5–5)
RBC # BLD: 4.88 E12/L (ref 3.5–5.5)
SODIUM BLD-SCNC: 138 MMOL/L (ref 132–146)
T4 FREE: 1.07 NG/DL (ref 0.93–1.7)
TOTAL PROTEIN: 7.9 G/DL (ref 6.4–8.3)
TSH SERPL DL<=0.05 MIU/L-ACNC: 3.7 UIU/ML (ref 0.27–4.2)
VITAMIN D 25-HYDROXY: 23 NG/ML (ref 30–100)
WBC # BLD: 8 E9/L (ref 4.5–11.5)

## 2019-10-22 ENCOUNTER — OFFICE VISIT (OUTPATIENT)
Dept: FAMILY MEDICINE CLINIC | Age: 24
End: 2019-10-22
Payer: COMMERCIAL

## 2019-10-22 VITALS
HEART RATE: 97 BPM | OXYGEN SATURATION: 98 % | WEIGHT: 143 LBS | TEMPERATURE: 98.5 F | SYSTOLIC BLOOD PRESSURE: 122 MMHG | DIASTOLIC BLOOD PRESSURE: 81 MMHG | BODY MASS INDEX: 27.93 KG/M2

## 2019-10-22 PROCEDURE — 99213 OFFICE O/P EST LOW 20 MIN: CPT | Performed by: FAMILY MEDICINE

## 2019-12-10 ENCOUNTER — OFFICE VISIT (OUTPATIENT)
Dept: FAMILY MEDICINE CLINIC | Age: 24
End: 2019-12-10
Payer: COMMERCIAL

## 2019-12-10 VITALS
OXYGEN SATURATION: 98 % | SYSTOLIC BLOOD PRESSURE: 118 MMHG | DIASTOLIC BLOOD PRESSURE: 78 MMHG | HEART RATE: 84 BPM | TEMPERATURE: 97.4 F

## 2019-12-10 DIAGNOSIS — B96.89 ACUTE BACTERIAL SINUSITIS: Primary | ICD-10-CM

## 2019-12-10 DIAGNOSIS — J01.90 ACUTE BACTERIAL SINUSITIS: Primary | ICD-10-CM

## 2019-12-10 PROCEDURE — 99213 OFFICE O/P EST LOW 20 MIN: CPT | Performed by: PHYSICIAN ASSISTANT

## 2019-12-10 RX ORDER — AMOXICILLIN 500 MG/1
500 CAPSULE ORAL 3 TIMES DAILY
Qty: 30 CAPSULE | Refills: 0 | Status: SHIPPED | OUTPATIENT
Start: 2019-12-10 | End: 2019-12-20

## 2019-12-10 ASSESSMENT — ENCOUNTER SYMPTOMS
WHEEZING: 0
SINUS PAIN: 1
CHEST TIGHTNESS: 0
EYES NEGATIVE: 1
TROUBLE SWALLOWING: 1
STRIDOR: 0
APNEA: 0
GASTROINTESTINAL NEGATIVE: 1
COUGH: 0
SHORTNESS OF BREATH: 0
CHOKING: 0
SORE THROAT: 1
SINUS PRESSURE: 1

## 2020-01-23 ENCOUNTER — OFFICE VISIT (OUTPATIENT)
Dept: FAMILY MEDICINE CLINIC | Age: 25
End: 2020-01-23
Payer: COMMERCIAL

## 2020-01-23 VITALS
WEIGHT: 137 LBS | DIASTOLIC BLOOD PRESSURE: 78 MMHG | OXYGEN SATURATION: 98 % | SYSTOLIC BLOOD PRESSURE: 124 MMHG | TEMPERATURE: 98.3 F | HEART RATE: 75 BPM | BODY MASS INDEX: 26.76 KG/M2

## 2020-01-23 PROCEDURE — 99214 OFFICE O/P EST MOD 30 MIN: CPT | Performed by: FAMILY MEDICINE

## 2020-01-23 RX ORDER — LANOLIN ALCOHOL/MO/W.PET/CERES
1000 CREAM (GRAM) TOPICAL DAILY
COMMUNITY
End: 2020-09-22

## 2020-01-23 RX ORDER — SERTRALINE HYDROCHLORIDE 25 MG/1
25 TABLET, FILM COATED ORAL DAILY
Qty: 30 TABLET | Refills: 1 | Status: SHIPPED
Start: 2020-01-23 | End: 2020-03-19

## 2020-01-23 ASSESSMENT — PATIENT HEALTH QUESTIONNAIRE - PHQ9
SUM OF ALL RESPONSES TO PHQ QUESTIONS 1-9: 0
SUM OF ALL RESPONSES TO PHQ QUESTIONS 1-9: 0
2. FEELING DOWN, DEPRESSED OR HOPELESS: 0
1. LITTLE INTEREST OR PLEASURE IN DOING THINGS: 0
SUM OF ALL RESPONSES TO PHQ9 QUESTIONS 1 & 2: 0

## 2020-01-23 NOTE — PROGRESS NOTES
Anxiety and/or depression:  Patient is here with complaints of anxiety and/or depression. \Patient does not have suicidal or homicidal ideation. Patient is not having issues falling or staying asleep. Patient is not having associated panic attacks. The above is not interfering with quality of life. Patient has seen a Counselor before. Patient does not use alcohol and/or drugs. Pt here today for f/u zoloft. Pt states doing very well overall. She wants to wean off the zoloft. Depression was following the birth of her most recent child. Patient's past medical, surgical, social and/or family history reviewed, updated in chart, and are non-contributory (unless otherwise stated). Medications and allergies also reviewed and updated in chart.          Review of Systems:  Constitutional:  No fever, no fatigue, no chills, no headaches, no weight change  Dermatology:  No rash, no mole, no dry or sensitive skin  ENT:  No cough, no sore throat, no sinus pain, no runny nose, no ear pain  Cardiology:  No chest pain, no palpitations, no leg edema, no shortness of breath, no PND  Gastroenterology:  No dysphagia, no abdominal pain, no nausea, no vomiting, no constipation, no diarrhea, no heartburn  Musculoskeletal:  No joint pain, no leg cramps, no back pain, no muscle aches  Respiratory:  No shortness of breath, no orthopnea, no wheezing, no BENÍTEZ, no hemoptysis  Urology:  No blood in the urine, no urinary frequency, no urinary incontinence, no urinary urgency, no nocturia, no dysuria  Vitals:    01/23/20 1047   BP: 124/78   Pulse: 75   Temp: 98.3 °F (36.8 °C)   SpO2: 98%   Weight: 137 lb (62.1 kg)       General:  Patient alert and oriented x 3, NAD, pleasant  HEENT:  Atraumatic, normocephalic, PERRLA, EOMI, clear conjunctiva, TMs clear, nose-clear, throat - no erythema  Neck:  Supple, no goiter, no carotid bruits, no lymphadenopathy  Lungs:  CTA B  Heart:  RRR, no murmurs, gallops or rubs  Abdomen: Soft/nt/nd, + bowel sounds  Extremities:  No clubbing, cyanosis or edema  Skin: unremarkable    Assessment/Plan:      Nahomi Marsh was seen today for anxiety and depression. Diagnoses and all orders for this visit:    Post partum depression    Other orders  -     sertraline (ZOLOFT) 25 MG tablet; Take 1 tablet by mouth daily      As above. Call or go to ED immediately if symptoms worsen or persist.  No follow-ups on file. , or sooner if necessary. Educational materials and/or home exercises printed for patient's review and were included in patient instructions on his/her After Visit Summary and given to patient at the end of visit. Counseled regarding above diagnosis, including possible risks and complications,  especially if left uncontrolled. Counseled regarding the possible side effects, risks, benefits and alternatives to treatment; patient and/or guardian verbalizes understanding, agrees, feels comfortable with and wishes to proceed with above treatment plan. Advised patient to call with any new medication issues, and read all Rx info from pharmacy to assure aware of all possible risks and side effects of medication before taking. Reviewed age and gender appropriate health screening exams and vaccinations. Advised patient regarding importance of keeping up with recommended health maintenance and to schedule as soon as possible if overdue, as this is important in assessing for undiagnosed pathology, especially cancer, as well as protecting against potentially harmful/life threatening disease. Patient and/or guardian verbalizes understanding and agrees with above counseling, assessment and plan. All questions answered. Naresh Figueroa MD  1/23/2020    I have personally reviewed and updated the chief complaint, HPI, Past Medical, Family and Social History, as well as the above Review of Systems.

## 2020-02-21 ENCOUNTER — TELEPHONE (OUTPATIENT)
Dept: NEUROLOGY | Age: 25
End: 2020-02-21

## 2020-02-21 NOTE — TELEPHONE ENCOUNTER
[place] to set up August appt in [place] w/Rg Murphy, [address]  Electronically signed by Bala Salguero on 2/21/20 at 1:46 PM

## 2020-03-04 ENCOUNTER — TELEPHONE (OUTPATIENT)
Dept: NEUROLOGY | Age: 25
End: 2020-03-04

## 2020-03-06 ENCOUNTER — OFFICE VISIT (OUTPATIENT)
Dept: FAMILY MEDICINE CLINIC | Age: 25
End: 2020-03-06
Payer: COMMERCIAL

## 2020-03-06 VITALS
BODY MASS INDEX: 26.95 KG/M2 | RESPIRATION RATE: 16 BRPM | TEMPERATURE: 98.3 F | DIASTOLIC BLOOD PRESSURE: 74 MMHG | OXYGEN SATURATION: 97 % | HEART RATE: 88 BPM | WEIGHT: 138 LBS | SYSTOLIC BLOOD PRESSURE: 112 MMHG

## 2020-03-06 LAB
INFLUENZA A ANTIBODY: ABNORMAL
INFLUENZA B ANTIBODY: ABNORMAL

## 2020-03-06 PROCEDURE — 87804 INFLUENZA ASSAY W/OPTIC: CPT | Performed by: FAMILY MEDICINE

## 2020-03-06 PROCEDURE — 99214 OFFICE O/P EST MOD 30 MIN: CPT | Performed by: FAMILY MEDICINE

## 2020-03-06 RX ORDER — CEFDINIR 300 MG/1
300 CAPSULE ORAL 2 TIMES DAILY
Qty: 20 CAPSULE | Refills: 0 | Status: SHIPPED | OUTPATIENT
Start: 2020-03-06 | End: 2020-03-16

## 2020-03-06 RX ORDER — OSELTAMIVIR PHOSPHATE 75 MG/1
75 CAPSULE ORAL 2 TIMES DAILY
Qty: 10 CAPSULE | Refills: 0 | Status: SHIPPED | OUTPATIENT
Start: 2020-03-06 | End: 2020-03-11

## 2020-03-06 NOTE — PROGRESS NOTES
3/6/20  Jennie Grigsby : 1995 Sex: female  Age: 30 y.o. Chief Complaint   Patient presents with    URI             HPI  HPI:    Patient presents today with 2 days of sinus pressure thick rhinorrhea postnasal drainage no headache dizziness fever  cough chest pain shortness with wheezing. symptoms. Works in the hospital.   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  Had flu shot  No travel hx  She denies any chance of being or getting pregnant and not breast-feeding.    ROS:  As above      Current Outpatient Medications:     cefdinir (OMNICEF) 300 MG capsule, Take 1 capsule by mouth 2 times daily for 10 days, Disp: 20 capsule, Rfl: 0    oseltamivir (TAMIFLU) 75 MG capsule, Take 1 capsule by mouth 2 times daily for 5 days, Disp: 10 capsule, Rfl: 0    vitamin B-12 (CYANOCOBALAMIN) 1000 MCG tablet, Take 1,000 mcg by mouth daily, Disp: , Rfl:     sertraline (ZOLOFT) 25 MG tablet, Take 1 tablet by mouth daily, Disp: 30 tablet, Rfl: 1    folic acid (FOLVITE) 1 MG tablet, TAKE 1 TABLET BY MOUTH 2 TIMES A DAY, Disp: 120 tablet, Rfl: 3    ibuprofen (ADVIL;MOTRIN) 800 MG tablet, Take 1 tablet by mouth every 8 hours as needed for Pain, Disp: 30 tablet, Rfl: 0    levETIRAcetam (KEPPRA XR) 500 MG TB24 extended release tablet, Take 2 tablets by mouth 2 times daily, Disp: 360 tablet, Rfl: 3    Rlihsj-TqUll-OsDwk-Meth-FA-DHA (PRENATE MINI) 18-0.6-0.4-350 MG CAPS, Take 1 tablet by mouth daily, Disp: 30 capsule, Rfl: 12    aspirin 81 MG tablet, Take 81 mg by mouth daily, Disp: , Rfl:   No Known Allergies    Past Medical History:   Diagnosis Date    Disease of blood and blood forming organ     History of seizure disorder     on kepra - sees tamulonmis     Influenza A     with possible pneumonia and ear infection / diagnosed 2018 / started on Tamiflu and antbiotic    Missed      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     Franko Grijalva MD    Patients are advised to check with insurance company to ensure coverage and to fully understand benefits and cost prior to any testing. This note was created with the assistance of voice recognition software. Document was reviewed however may contain grammatical errors.

## 2020-03-19 ENCOUNTER — HOSPITAL ENCOUNTER (OUTPATIENT)
Age: 25
Discharge: HOME | End: 2020-03-21
Payer: COMMERCIAL

## 2020-03-19 PROCEDURE — 88175 CYTOPATH C/V AUTO FLUID REDO: CPT

## 2020-03-25 PROBLEM — O35.5XX0 SUSPECTED DAMAGE TO FETUS FROM MATERNAL DRUG USE: Status: RESOLVED | Noted: 2020-03-25 | Resolved: 2020-03-24

## 2020-03-27 ENCOUNTER — OFFICE VISIT (OUTPATIENT)
Dept: FAMILY MEDICINE CLINIC | Age: 25
End: 2020-03-27
Payer: COMMERCIAL

## 2020-03-27 VITALS
SYSTOLIC BLOOD PRESSURE: 144 MMHG | BODY MASS INDEX: 26.56 KG/M2 | RESPIRATION RATE: 14 BRPM | WEIGHT: 136 LBS | DIASTOLIC BLOOD PRESSURE: 80 MMHG | OXYGEN SATURATION: 98 % | HEART RATE: 86 BPM | TEMPERATURE: 98.3 F

## 2020-03-27 PROCEDURE — 99213 OFFICE O/P EST LOW 20 MIN: CPT | Performed by: FAMILY MEDICINE

## 2020-03-27 PROCEDURE — L4350 ANKLE CONTROL ORTHO PRE OTS: HCPCS | Performed by: FAMILY MEDICINE

## 2020-03-27 RX ORDER — IBUPROFEN 600 MG/1
600 TABLET ORAL EVERY 6 HOURS PRN
Qty: 60 TABLET | Refills: 1 | Status: ON HOLD
Start: 2020-03-27 | End: 2020-12-11 | Stop reason: HOSPADM

## 2020-03-27 ASSESSMENT — ENCOUNTER SYMPTOMS
RESPIRATORY NEGATIVE: 1
COLOR CHANGE: 1

## 2020-03-27 NOTE — PROGRESS NOTES
3/27/20  [facility] : 1995 Sex: female  Age: 30 y.o. Chief Complaint   Patient presents with    Ankle Injury     rt ankle selling from fall down stairs       This patient is a 68-year-old white female who fell down the steps at home injuring her right ankle and foot and Achilles area. There is a large amount of soft tissue swelling overlying the fourth and fifth metatarsal of the right ankle. With the flexion and extension she also has. Over the Achilles tendon and right calcaneus. Review of Systems   Respiratory: Negative. Cardiovascular: Negative. Musculoskeletal: Positive for gait problem and joint swelling. Skin: Positive for color change.          Current Outpatient Medications:     ibuprofen (ADVIL;MOTRIN) 600 MG tablet, Take 1 tablet by mouth every 6 hours as needed for Pain, Disp: 60 tablet, Rfl: 1    vitamin B-12 (CYANOCOBALAMIN) 1000 MCG tablet, Take 1,000 mcg by mouth daily, Disp: , Rfl:     folic acid (FOLVITE) 1 MG tablet, TAKE 1 TABLET BY MOUTH 2 TIMES A DAY, Disp: 120 tablet, Rfl: 3    Pesdjk-IuOny-DjMxe-Meth-FA-DHA (PRENATE MINI) 18-0.6-0.4-350 MG CAPS, Take 1 tablet by mouth daily, Disp: 30 capsule, Rfl: 12    aspirin 81 MG tablet, Take 81 mg by mouth daily, Disp: , Rfl:   No Known Allergies    Past Medical History:   Diagnosis Date    Disease of blood and blood forming organ     History of seizure disorder     on kepra - sees tamulonmis     Influenza A     with possible pneumonia and ear infection / diagnosed 2018 / started on Tamiflu and antbiotic    Missed      MTHFR (methylene THF reductase) deficiency and homocystinuria ([facility] [address]     ARMANDO-1 4G/4G genotype     Seizures ([facility] [address]     Tamilonus, last one     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 ([facility] [address] 2018     Past Surgical History:   Procedure Laterality Date    DILATION AND CURETTAGE      DILATION AND CURETTAGE OF UTERUS  2018    SUCTION    MENISCECTOMY Right 16 Renetta    TONSILLECTOMY      25    VAGINA SURGERY N/A 2/10/2019    VAGINAL REPAIR performed by [facility] at [facility]    WISDOM TOOTH EXTRACTION       Family History   Problem Relation Age of Onset    High Blood Pressure Mother         migraines    Thyroid Disease Mother     Asthma Brother     Thyroid Disease Maternal Grandmother         migraines     Social History     Tobacco Use    Smoking status: Never Smoker    Smokeless tobacco: Never Used   Substance Use Topics    Alcohol use: No     Alcohol/week: 0.0 standard drinks    Drug use: No        Vitals:    03/27/20 1046   BP: (!) 144/80   Pulse: 86   Resp: 14   Temp: 98.3 °F (36.8 °C)   TempSrc: Temporal   SpO2: 98%   Weight: 136 lb (61.7 kg)  Comment: pt reported       Physical Exam  Vitals signs reviewed. Constitutional:       Appearance: Normal appearance. HENT:      Head: Normocephalic and atraumatic. Cardiovascular:      Pulses:           Dorsalis pedis pulses are 2+ on the right side. Posterior tibial pulses are 2+ on the right side. Musculoskeletal:         General: Swelling, tenderness and signs of injury present. Right foot: Decreased range of motion. Feet:    Feet:      Right foot:      Skin integrity: Skin integrity normal.   Skin:     Findings: Bruising present. Neurological:      Mental Status: She is alert. Assessment and Plan:  Ginny Zhang was seen today for ankle injury. Diagnoses and all orders for this visit:    Ankle swelling, right  -     XR ANKLE RIGHT (MIN 3 VIEWS); Future    Sprain and strain of left ankle    Sprain and strain of right ankle  -     MT ANKLE CONTROL ORTHO PRE OTS    Other orders  -     ibuprofen (ADVIL;MOTRIN) 600 MG tablet;  Take 1 tablet by mouth every 6 hours as needed for Pain        Orders Placed This Encounter   Medications    ibuprofen (ADVIL;MOTRIN) 600 MG tablet     Sig: Take 1 tablet by mouth every 6 hours as needed for Pain     Dispense:  60 tablet Refill:  1        Patient advised to follow up with PCP as needed.         Seen By:  Jasmeet Mcdaniels,

## 2020-03-27 NOTE — PATIENT INSTRUCTIONS
The x-rays of her right ankle and foot were negative and will interfere with a ankle support and if she is not better in a week to 10 days she is to return.

## 2020-03-31 ENCOUNTER — OFFICE VISIT (OUTPATIENT)
Dept: FAMILY MEDICINE CLINIC | Age: 25
End: 2020-03-31
Payer: COMMERCIAL

## 2020-03-31 VITALS
BODY MASS INDEX: 23.34 KG/M2 | SYSTOLIC BLOOD PRESSURE: 122 MMHG | HEART RATE: 94 BPM | DIASTOLIC BLOOD PRESSURE: 74 MMHG | HEIGHT: 64 IN | OXYGEN SATURATION: 98 % | TEMPERATURE: 97.3 F

## 2020-03-31 PROCEDURE — 99213 OFFICE O/P EST LOW 20 MIN: CPT | Performed by: FAMILY MEDICINE

## 2020-03-31 NOTE — PROGRESS NOTES
Chief Complaint   Patient presents with    Fall     fell down steps    Foot Pain     rt       HPI:  Patient is here for follow-up of left foot pain. Patient complains of persistent pain after falling down the stairs- had and inversion injury. She did not have a frx on xray. She is unable to bear weight    Patient's past medical, surgical, social and/or family history reviewed, updated in chart, and are non-contributory (unless otherwise stated). Medications and allergies also reviewed and updated in chart. Review of Systems:  Constitutional:  No fever, no fatigue, no chills, no headaches, no weight change  Dermatology:  No rash, no mole, no dry or sensitive skin  ENT:  No cough, no sore throat, no sinus pain, no runny nose, no ear pain  Cardiology:  No chest pain, no palpitations, no leg edema, no shortness of breath, no PND  Gastroenterology:  No dysphagia, no abdominal pain, no nausea, no vomiting, no constipation, no diarrhea, no heartburn  Musculoskeletal:  + joint pain, no leg cramps, no back pain, no muscle aches  Respiratory:  No shortness of breath, no orthopnea, no wheezing, no BENÍTEZ, no hemoptysis  Urology:  No blood in the urine, no urinary frequency, no urinary incontinence, no urinary urgency, no nocturia, no dysuria    Vitals:    03/31/20 1040   BP: 122/74   Pulse: 94   Temp: 97.3 °F (36.3 °C)   SpO2: 98%   Height: 5' 4\" (1.626 m)       General:  Patient alert and oriented x 3, NAD, pleasant  Extremities:  No clubbing, cyanosis or edema  Skin: unremarkable  MS:  There is bruising and swelling along the 3rd fourth and fifth metatarsal bones without point tenderness of any step off. There is pain with inversion of the ankle and flexion. Assessment/Plan:      Danielle Tavera was seen today for fall and foot pain.     Diagnoses and all orders for this visit:    Right foot pain  -     Radha - Naila Gilbert MD, Orthopaedics and Rehabilitation, [facility]    Acute right ankle pain  -     XR FOOT RIGHT (MIN 3 VIEWS); Future  -     XR ANKLE RIGHT (MIN 3 VIEWS); Future  -     Radha - Evita Wilks MD, Orthopaedics and Rehabilitation, [facility]      As above. Call or go to ED immediately if symptoms worsen or persist.  No follow-ups on file. , or sooner if necessary. Educational materials and/or home exercises printed for patient's review and were included in patient instructions on his/her After Visit Summary and given to patient at the end of visit. Counseled regarding above diagnosis, including possible risks and complications,  especially if left uncontrolled. Counseled regarding the possible side effects, risks, benefits and alternatives to treatment; patient and/or guardian verbalizes understanding, agrees, feels comfortable with and wishes to proceed with above treatment plan. Advised patient to call with any new medication issues, and read all Rx info from pharmacy to assure aware of all possible risks and side effects of medication before taking. Reviewed age and gender appropriate health screening exams and vaccinations. Advised patient regarding importance of keeping up with recommended health maintenance and to schedule as soon as possible if overdue, as this is important in assessing for undiagnosed pathology, especially cancer, as well as protecting against potentially harmful/life threatening disease. Patient and/or guardian verbalizes understanding and agrees with above counseling, assessment and plan. All questions answered. Jimmie Hawkins MD  4/1/2020    I have personally reviewed and updated the chief complaint, HPI, Past Medical, Family and Social History, as well as the above Review of Systems.

## 2020-04-02 ENCOUNTER — TELEMEDICINE (OUTPATIENT)
Dept: ORTHOPEDIC SURGERY | Age: 25
End: 2020-04-02
Payer: COMMERCIAL

## 2020-04-02 PROBLEM — S93.401A SPRAIN OF RIGHT ANKLE: Status: ACTIVE | Noted: 2020-04-02

## 2020-04-02 PROCEDURE — 99213 OFFICE O/P EST LOW 20 MIN: CPT | Performed by: ORTHOPAEDIC SURGERY

## 2020-04-02 NOTE — PROGRESS NOTES
2020    TELEHEALTH EVALUATION -- Audio/Visual (During RVHGX-58 public health emergency)    HPI:    Sraa Garcia (:  1995) has requested an audio/video evaluation for the following concern(s):    Yamileth Zabala is being seen today for right ankle sprain and foot swelling. She rolled her ankle several days ago. She was seen by Dr. Fabiola Martin and subsequently referred to us due to her level of swelling and inability to bear weight. She states the swelling has improved somewhat. She is still having pain with weightbearing. She has been using an Aircast brace. REVIEW OF SYSTEMS:      Constitutional:  Negative for weight loss, fevers, chills, fatigue  Cardiovascular: Negative for chest pain, palpitations  Pulmonary: Negative for shortness of breath, labored breathing, cough  GI: negative for abdominal pain, nausea, vomitting   MSK: per HPI  Skin: negative for rash, open wounds    Prior to Visit Medications    Medication Sig Taking?  Authorizing Provider   ibuprofen (ADVIL;MOTRIN) 600 MG tablet Take 1 tablet by mouth every 6 hours as needed for Pain  Santi Aguilar DO   vitamin B-12 (CYANOCOBALAMIN) 1000 MCG tablet Take 1,000 mcg by mouth daily  Historical Provider, MD   folic acid (FOLVITE) 1 MG tablet TAKE 1 TABLET BY MOUTH 2 TIMES A DAY  Joe Farias MD   Utveqy-ZlIyg-DhWup-Meth-FA-DHA (PRENATE MINI) 18-0.6-0.4-350 MG CAPS Take 1 tablet by mouth daily  Joe Farias MD   aspirin 81 MG tablet Take 81 mg by mouth daily  Historical Provider, MD       Social History     Tobacco Use    Smoking status: Never Smoker    Smokeless tobacco: Never Used   Substance Use Topics    Alcohol use: No     Alcohol/week: 0.0 standard drinks    Drug use: No        No Known Allergies    Past Medical History:   Diagnosis Date    Disease of blood and blood forming organ     History of seizure disorder     on kepra - sees tamulonmis     Influenza A     with possible pneumonia and ear infection / diagnosed 2018 / Sclera clear  [] Lips are cyanotic      [x] Breathing appears normal  [] Appears tachypneic      MSK:    On exam of the ankle, she does have a significant amount of swelling and bruising mainly over the dorsum of the foot, laterally. There is less swelling up around the ankle. She is able to move her ankle and wiggle her toes. She has no sensory loss    Due to this being a TeleHealth encounter, evaluation of the following organ systems is limited: Vitals/Constitutional/EENT/Resp/CV/GI//MS/Neuro/Skin/Heme-Lymph-Imm. X-rays of the ankle including 3 views were reviewed. These show no acute abnormality. Foot is poorly visualized. ASSESSMENT/PLAN:  Right foot/ankle swelling and pain after inversion injury    We discussed her ankle. Based on appearance and mechanism this is likely an ankle sprain which I suspect will resolve. I would like her to get her foot x-ray which has been ordered to rule out any fracture of her metatarsal given the significant swelling over the dorsum of the foot. In the meantime, she can ice, elevate, take anti-inflammatories. I feel she can safely weight-bear as tolerated, but should keep weight off of her ankle if she is having significant pain. I will call her with results once she obtains her foot x-ray. We will see her in the office in a few weeks if she is not improving    An  electronic signature was used to authenticate this note. --Sivakumar Horton MD on 4/2/2020 at 10:25 AM        Pursuant to the emergency declaration under the [facility][note 3][facility], [facility] waiver authority and the [employer] Act, this Virtual  Visit was conducted, with patient's consent, to reduce the patient's risk of exposure to COVID-19 and provide continuity of care for an established patient. Services were provided through a video synchronous discussion virtually to substitute for in-person clinic visit.

## 2020-04-03 ENCOUNTER — HOSPITAL ENCOUNTER (OUTPATIENT)
Age: 25
Discharge: HOME | End: 2020-04-05
Payer: COMMERCIAL

## 2020-04-03 ENCOUNTER — TELEPHONE (OUTPATIENT)
Dept: ORTHOPEDIC SURGERY | Age: 25
End: 2020-04-03

## 2020-04-03 ENCOUNTER — HOSPITAL ENCOUNTER (OUTPATIENT)
Dept: GENERAL RADIOLOGY | Age: 25
Discharge: HOME | End: 2020-04-05
Payer: COMMERCIAL

## 2020-04-03 PROCEDURE — 73610 X-RAY EXAM OF ANKLE: CPT

## 2020-04-03 PROCEDURE — 73630 X-RAY EXAM OF FOOT: CPT

## 2020-04-03 NOTE — TELEPHONE ENCOUNTER
I called Seth Garvey and discussed her x-ray findings. X-ray foot shows no fracture or other obvious bony ability. X-ray the ankle is normal.  She will continue with activities as tolerated. If she is still having significant pain or inability to bear weight we will see her in the office in about 2 to 3 weeks.       Ck Adam MD  [address] [place] [address]

## 2020-04-07 RX ORDER — LEVETIRACETAM 500 MG/1
TABLET, EXTENDED RELEASE ORAL
Qty: 360 TABLET | Refills: 3 | Status: SHIPPED
Start: 2020-04-07 | End: 2021-04-19

## 2020-04-22 ENCOUNTER — OFFICE VISIT (OUTPATIENT)
Dept: ORTHOPEDIC SURGERY | Age: 25
End: 2020-04-22
Payer: COMMERCIAL

## 2020-04-22 VITALS — HEIGHT: 60 IN | BODY MASS INDEX: 26.5 KG/M2 | TEMPERATURE: 97.8 F | WEIGHT: 135 LBS

## 2020-04-22 PROCEDURE — 99213 OFFICE O/P EST LOW 20 MIN: CPT | Performed by: ORTHOPAEDIC SURGERY

## 2020-04-22 NOTE — PROGRESS NOTES
Follow Up Visit     Zohreh Moore returns today for follow up visit regarding right ankle sprain. Treatment thus far has included NSAIDS with good improvement Bracing: Air Cast with good improvement. She reports symptoms are improving. Reports decrease in pain, swelling, bruising. Reports pain still mildly present, has resumed walking unassisted on affected extremity. Physical Exam:     Height: 5' (1.524 m), Weight: 135 lb (61.2 kg)    General: Alert and oriented x3, no acute distress  Cardiovascular/pulmonary: No labored breathing, peripheral perfusion intact  Musculoskeletal:    Right ankle exam show mild swelling and bruising over foot, no swelling present to ankle, range of motion intact, tenderness over lateral ankle joint. Stable joint. Controlled Substances Monitoring:      Imaging:  Previous imaging reviewed. XR Right ankle 3 view shows unremarkable ankle for acute process. Normal ankle xray. Assessment:   Right Ankle Sprain      Plan: Today we discussed her ankle. She reports making improvements, she may discontinue air cast.  Discussed getting a stretch band to work on increasing strength in her ankle. Sent home with exercises, she verbalizes understanding. She will follow up as needed if symptoms change. Aspen Lozada CNP  Orthopedic Surgery  12:54 PM      Agree with above. Patient is improving. She had a moderate sprain. Her swelling and bruising have improved. She has some mild tenderness over the ATFL. I recommended continued rehab at home with stretching and strengthening utilizing bands.   We will see her back as needed if she does not continue to improve    Vlad Ozuna MD  [address]

## 2020-04-24 ENCOUNTER — VIRTUAL VISIT (OUTPATIENT)
Dept: FAMILY MEDICINE CLINIC | Age: 25
End: 2020-04-24
Payer: COMMERCIAL

## 2020-04-24 PROCEDURE — 99213 OFFICE O/P EST LOW 20 MIN: CPT | Performed by: FAMILY MEDICINE

## 2020-04-24 NOTE — PROGRESS NOTES
hours as needed for Pain 3/27/20  Yes Anmol Workman,    vitamin B-12 (CYANOCOBALAMIN) 1000 MCG tablet Take 1,000 mcg by mouth daily   Yes Historical Provider, MD   folic acid (FOLVITE) 1 MG tablet TAKE 1 TABLET BY MOUTH 2 TIMES A DAY 1/6/20  Yes Jocelyn Mclean MD   Yoyxev-BiRhq-FzOav-Meth-FA-DHA (PRENATE MINI) 18-0.6-0.4-350 MG CAPS Take 1 tablet by mouth daily 7/18/18  Yes Jocelyn Mclean MD   aspirin 81 MG tablet Take 81 mg by mouth daily   Yes Historical Provider, MD       Social hx:   Cherri Murphy  reports that she has never smoked. She has never used smokeless tobacco. She reports that she does not drink alcohol or use drugs. I reviewed the patient's past past medical history, past surgical history, family history, social history, medications and allergies during this visit    Vitals / PE:  Due to this being a TeleHealth encounter (During JQX-26 public health emergency), evaluation of the following organ systems was limited: Vitals/Constitutional/EENT/Resp/CV/GI//MS/Neuro/Skin/Heme-Lymph-Imm.       Vital Signs: (As obtained by patient/caregiver or practitioner observation)  Blood pressure-  Heart rate-    Respiratory rate-    Temperature-  Pulse oximetry-   Wt Readings from Last 3 Encounters:   04/22/20 135 lb (61.2 kg)   03/27/20 136 lb (61.7 kg)   03/19/20 135 lb (61.2 kg)       Constitutional - alert, well appearing, and in no distress  Eyes - extraocular eye movements intact, left eye normal, right eye normal, no conjunctivitis noted  Neck - symmetric, no obvious masses noted  Respiratory- no increased work of breathing; no visible signs of difficulty breathing or respiratory distress  Cardiovascular - Extremities - not examined  Musculoskeletal - no clubbing, cyanosis of extremities noted; normal ROM of neck  Skin - normal coloration, no rashes, no suspicious skin lesions noted on face or other exposed areas visible through virtual encounter  Neurological - no obvious CN deficits or facial

## 2020-07-16 ENCOUNTER — NURSE ONLY (OUTPATIENT)
Dept: PRIMARY CARE CLINIC | Age: 25
End: 2020-07-16

## 2020-07-16 ENCOUNTER — HOSPITAL ENCOUNTER (OUTPATIENT)
Age: 25
Discharge: HOME | End: 2020-07-18
Payer: COMMERCIAL

## 2020-07-16 PROCEDURE — U0003 INFECTIOUS AGENT DETECTION BY NUCLEIC ACID (DNA OR RNA); SEVERE ACUTE RESPIRATORY SYNDROME CORONAVIRUS 2 (SARS-COV-2) (CORONAVIRUS DISEASE [COVID-19]), AMPLIFIED PROBE TECHNIQUE, MAKING USE OF HIGH THROUGHPUT TECHNOLOGIES AS DESCRIBED BY CMS-2020-01-R: HCPCS

## 2020-07-18 LAB
SARS-COV-2: NOT DETECTED
SOURCE: NORMAL

## 2020-08-10 ENCOUNTER — PATIENT MESSAGE (OUTPATIENT)
Dept: FAMILY MEDICINE CLINIC | Age: 25
End: 2020-08-10

## 2020-08-10 NOTE — TELEPHONE ENCOUNTER
From: Sneha Lange  To: Jack Garcia MD  Sent: 8/10/2020 9:08 AM EDT  Subject: Non-Urgent Medical Question    I have a be well screening physical scheduled for end of september i was wondering if possibly you would be willing to order the blood work prior to my appnt so i can have that done and resulted by my appnt day.  Thanks so much

## 2020-08-28 ENCOUNTER — HOSPITAL ENCOUNTER (OUTPATIENT)
Age: 25
Discharge: HOME | End: 2020-08-28
Payer: COMMERCIAL

## 2020-08-28 LAB
ALBUMIN SERPL-MCNC: 4.5 G/DL (ref 3.5–5.2)
ALP BLD-CCNC: 56 U/L (ref 35–104)
ALT SERPL-CCNC: 10 U/L (ref 0–32)
ANION GAP SERPL CALCULATED.3IONS-SCNC: 7 MMOL/L (ref 7–16)
AST SERPL-CCNC: 16 U/L (ref 0–31)
BASOPHILS ABSOLUTE: 0.04 E9/L (ref 0–0.2)
BASOPHILS RELATIVE PERCENT: 0.8 % (ref 0–2)
BILIRUB SERPL-MCNC: 0.5 MG/DL (ref 0–1.2)
BUN BLDV-MCNC: 11 MG/DL (ref 6–20)
CALCIUM SERPL-MCNC: 9.6 MG/DL (ref 8.6–10.2)
CHLORIDE BLD-SCNC: 104 MMOL/L (ref 98–107)
CO2: 28 MMOL/L (ref 22–29)
CREAT SERPL-MCNC: 0.5 MG/DL (ref 0.5–1)
EOSINOPHILS ABSOLUTE: 0.08 E9/L (ref 0.05–0.5)
EOSINOPHILS RELATIVE PERCENT: 1.6 % (ref 0–6)
GFR AFRICAN AMERICAN: >60
GFR NON-AFRICAN AMERICAN: >60 ML/MIN/1.73
GLUCOSE BLD-MCNC: 82 MG/DL (ref 74–99)
HCT VFR BLD CALC: 43.9 % (ref 34–48)
HEMOGLOBIN: 14.3 G/DL (ref 11.5–15.5)
IMMATURE GRANULOCYTES #: 0.01 E9/L
IMMATURE GRANULOCYTES %: 0.2 % (ref 0–5)
LYMPHOCYTES ABSOLUTE: 2.2 E9/L (ref 1.5–4)
LYMPHOCYTES RELATIVE PERCENT: 44 % (ref 20–42)
MCH RBC QN AUTO: 29.8 PG (ref 26–35)
MCHC RBC AUTO-ENTMCNC: 32.6 % (ref 32–34.5)
MCV RBC AUTO: 91.5 FL (ref 80–99.9)
MONOCYTES ABSOLUTE: 0.41 E9/L (ref 0.1–0.95)
MONOCYTES RELATIVE PERCENT: 8.2 % (ref 2–12)
NEUTROPHILS ABSOLUTE: 2.26 E9/L (ref 1.8–7.3)
NEUTROPHILS RELATIVE PERCENT: 45.2 % (ref 43–80)
PDW BLD-RTO: 12.6 FL (ref 11.5–15)
PLATELET # BLD: 218 E9/L (ref 130–450)
PMV BLD AUTO: 10.2 FL (ref 7–12)
POTASSIUM SERPL-SCNC: 4 MMOL/L (ref 3.5–5)
RBC # BLD: 4.8 E12/L (ref 3.5–5.5)
SODIUM BLD-SCNC: 139 MMOL/L (ref 132–146)
T4 FREE: 1.16 NG/DL (ref 0.93–1.7)
TOTAL PROTEIN: 7.3 G/DL (ref 6.4–8.3)
TSH SERPL DL<=0.05 MIU/L-ACNC: 2.76 UIU/ML (ref 0.27–4.2)
VITAMIN D 25-HYDROXY: 26 NG/ML (ref 30–100)
WBC # BLD: 5 E9/L (ref 4.5–11.5)

## 2020-08-28 PROCEDURE — 84443 ASSAY THYROID STIM HORMONE: CPT

## 2020-08-28 PROCEDURE — 36415 COLL VENOUS BLD VENIPUNCTURE: CPT

## 2020-08-28 PROCEDURE — 80053 COMPREHEN METABOLIC PANEL: CPT

## 2020-08-28 PROCEDURE — 82306 VITAMIN D 25 HYDROXY: CPT

## 2020-08-28 PROCEDURE — 84439 ASSAY OF FREE THYROXINE: CPT

## 2020-08-28 PROCEDURE — 85025 COMPLETE CBC W/AUTO DIFF WBC: CPT

## 2020-08-28 RX ORDER — ERGOCALCIFEROL 1.25 MG/1
50000 CAPSULE ORAL WEEKLY
Qty: 12 CAPSULE | Refills: 2 | Status: SHIPPED | OUTPATIENT
Start: 2020-08-28 | End: 2021-06-02 | Stop reason: SDUPTHER

## 2020-08-30 ENCOUNTER — PATIENT MESSAGE (OUTPATIENT)
Dept: FAMILY MEDICINE CLINIC | Age: 25
End: 2020-08-30

## 2020-08-31 NOTE — TELEPHONE ENCOUNTER
From: Naty Ramos  To: Shantell Sanchez MD  Sent: 8/30/2020 9:33 PM EDT  Subject: Non-Urgent Medical Question    Hello i have reviewed my labs and ur note ab the vitamin d thanks so much! I was looking at the paper i hve to get done for work and i realized i also need my cholesterol levels checked for it as well so if we cld get that done prior to my appnt that would be awesome as well thanks again for everything.

## 2020-09-11 ENCOUNTER — HOSPITAL ENCOUNTER (OUTPATIENT)
Age: 25
Discharge: HOME | End: 2020-09-11
Payer: COMMERCIAL

## 2020-09-11 LAB
CHOLESTEROL, TOTAL: 152 MG/DL (ref 0–199)
HDLC SERPL-MCNC: 75 MG/DL
LDL CHOLESTEROL CALCULATED: 69 MG/DL (ref 0–99)
TRIGL SERPL-MCNC: 42 MG/DL (ref 0–149)
VLDLC SERPL CALC-MCNC: 8 MG/DL

## 2020-09-11 PROCEDURE — 36415 COLL VENOUS BLD VENIPUNCTURE: CPT

## 2020-09-11 PROCEDURE — 80061 LIPID PANEL: CPT

## 2020-09-22 ENCOUNTER — OFFICE VISIT (OUTPATIENT)
Dept: FAMILY MEDICINE CLINIC | Age: 25
End: 2020-09-22
Payer: COMMERCIAL

## 2020-09-22 VITALS
HEIGHT: 65 IN | HEART RATE: 93 BPM | WEIGHT: 127 LBS | OXYGEN SATURATION: 98 % | TEMPERATURE: 97.3 F | BODY MASS INDEX: 21.16 KG/M2 | RESPIRATION RATE: 16 BRPM | SYSTOLIC BLOOD PRESSURE: 110 MMHG | DIASTOLIC BLOOD PRESSURE: 70 MMHG

## 2020-09-22 PROBLEM — S93.401A SPRAIN OF RIGHT ANKLE: Status: RESOLVED | Noted: 2020-04-02 | Resolved: 2020-09-22

## 2020-09-22 PROBLEM — Z3A.35 35 WEEKS GESTATION OF PREGNANCY: Status: RESOLVED | Noted: 2019-02-09 | Resolved: 2020-09-22

## 2020-09-22 PROBLEM — O35.5XX0 SUSPECTED DAMAGE TO FETUS FROM MATERNAL DRUG USE: Status: RESOLVED | Noted: 2019-01-29 | Resolved: 2020-09-22

## 2020-09-22 PROBLEM — O44.03 PLACENTA PREVIA ANTEPARTUM, THIRD TRIMESTER: Status: RESOLVED | Noted: 2018-12-31 | Resolved: 2020-09-22

## 2020-09-22 PROBLEM — O46.93 THIRD TRIMESTER BLEEDING: Status: RESOLVED | Noted: 2018-12-17 | Resolved: 2020-09-22

## 2020-09-22 PROBLEM — O09.93 HIGH-RISK PREGNANCY IN THIRD TRIMESTER: Status: RESOLVED | Noted: 2018-12-17 | Resolved: 2020-09-22

## 2020-09-22 PROCEDURE — 99395 PREV VISIT EST AGE 18-39: CPT | Performed by: FAMILY MEDICINE

## 2020-09-22 NOTE — PROGRESS NOTES
CC: Delia Huggins is a 30 y.o. yo female here for evaluation of the following medical concerns: Annual Exam (physical, no complaints)      HPI:  Adult Screening:   Blood pressure normotensive: Yes    Obesity (BMI 30+): No   Diabetes screen: NA    Statin for CVD events and mortality preventive medication: NA   Aspirin for CVD and colon cancer preventive medication: NA   Diet to prevent CV disease (adults with cardiovascular risk factors): NA; Patient's cardiovascular risk history includes: none   Alcohol use:   reports no history of alcohol use.  Tobacco abuse:   reports that she has never smoked. She has never used smokeless tobacco.  Depression screening: No data recorded   Annual lung cancer screening: NA    Colorectal Cancer Screening, average risk (50-75yrs): NA    Hx of CRC before age 57yrs in FDR: NA    Hep C virus infection screening: NA    Fall prevention: NA     Sexually active specific:    Sexual activity: single partner, contraception - yes; plans to try to conceive starting next month, Dr. Remy Elkins; High risk behavior: none,  Sexually transmitted infections counseling: no   Folate supplementation (reproductive age): yes   Intimate partner violence concerns (reproductive age): safe    Cervical Cancer Screening (21 - 65 years): yes;  Hx abnormal PAP: no   BRCA risk assessment and genetic counseling/testing: no personal or family hx of breast, ovarian, tubal or peritoneal ca or ancestry associated with BRCA1/2 gene mutations: None   Breast Cancer Screening: NA    Osteoporosis screening in postmenopausal women younger than 72 years at increased risk of osteoporosis / 10-year FRAX risk of major osteoporotic fracture (MOF) (without DXA) greater than that of a 15-year-old white woman without major risk factors (8.4%): NA    Osteoporosis screening: women 65 years and older: NA      Health Maintenance Due   Topic Date Due    Flu vaccine (1) 09/01/2020     Immunization History tablet by mouth every 6 hours as needed for Pain 3/27/20  Yes Marky Workman, DO   folic acid (FOLVITE) 1 MG tablet TAKE 1 TABLET BY MOUTH 2 TIMES A DAY 1/6/20  Yes Kip Gomez MD   Fzpyrb-IrFau-KsYjb-Meth-FA-DHA (PRENATE MINI) 18-0.6-0.4-350 MG CAPS Take 1 tablet by mouth daily 7/18/18  Yes Kip Gomez MD   aspirin 81 MG tablet Take 81 mg by mouth daily   Yes Historical Provider, MD       ROS:  General: no new fever, chills, night sweats, weight changes      Ophthalmic: no new vision changes  ENT: no new headaches, sinus problems, URI symptoms  Cardiovascular: no new chest pain or dyspnea on exertion, palpitations  Respiratory: no new cough, shortness of breath  Gastrointestinal: no new abdominal pain, change in bowel habits, or black or bloody stools  Genito-Urinary: no new dysuria, trouble voiding, or hematuria  Endocrine: no new hot flashes, malaise/lethargy, polydipsia/polyuria, skin changes, temperature intolerance and unexpected weight changes   Musculoskeletal: no new  joint pain, muscle pain  Hematological and Lymphatic: no new bleeding problems  Dermatological: no new rash and skin lesion changes  Neurological: no new TIA or stroke symptoms  Psychological: no current depression or anxiety  Allergy and Immunology: no new nasal congestion, postnasal drip and seasonal allergies    Vitals:  /70 (Site: Left Upper Arm, Position: Sitting, Cuff Size: Medium Adult)   Pulse 93   Temp 97.3 °F (36.3 °C) (Temporal)   Resp 16   Ht 5' 5\" (1.651 m)   Wt 127 lb (57.6 kg)   SpO2 98%   BMI 21.13 kg/m²   Wt Readings from Last 3 Encounters:   09/22/20 127 lb (57.6 kg)   04/22/20 135 lb (61.2 kg)   03/27/20 136 lb (61.7 kg)       Physical Exam:  Constitutional - alert, well appearing, and in no distress  Eyes - pupils equal and reactive, extraocular eye movements intact, left eye normal, right eye normal, no conjunctivitis noted  ENT - right ear normal, left ear normal; nose normal and patent, no erythema, discharge; mouth/throat mucous membranes moist, pharynx normal without lesions  Neck - supple, no significant adenopathy; thyroid exam: thyroid is normal in size without nodules or tenderness  Respiratory- clear to auscultation, no wheezes, rales or rhonchi, symmetric air entry; no increased work of breathing  Cardiovascular - normal rate, regular rhythm, normal S1, S2, no murmurs, rubs, clicks or gallops; Extremities - peripheral pulses normal, no pedal edema, no clubbing or cyanosis  Abdomen - soft, nontender, nondistended, no masses or organomegaly  Musculoskeletal - gait normal; no clubbing, cyanosis of extremities noted; no joint deformity or swelling noted; full active range of motion noted without pain  Skin - normal coloration and turgor, no rashes, no suspicious skin lesions noted  Neurological - alert, oriented; no obvious CN deficits, normal speech, no focal findings or movement disorder noted  Psychiatric - alert, oriented to person, place, and time, normal mood, behavior, speech, dress, motor activity, and thought processes, affect appropriate to mood    Labs:  Pertinent labs, imaging, other diagnostic data and other clinician documentation reviewed in electronic medical record. Assessment / Plan   Diagnosis Orders   1. Encounter to establish care     2. Encounter for preventive care     3. 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 Thrombophilia (HCC)         Continue ASA   Routine f/u 1 year for prevention visit    RTO: Return in about 1 year (around 9/22/2021) for Annual Preventive.       An electronic signature was used to authenticate this note.  ---- Meredith Gamino MD on 9/22/2020 at 11:42 AM

## 2020-10-05 ENCOUNTER — OFFICE VISIT (OUTPATIENT)
Dept: NEUROLOGY | Age: 25
End: 2020-10-05
Payer: COMMERCIAL

## 2020-10-05 VITALS
RESPIRATION RATE: 20 BRPM | BODY MASS INDEX: 21.16 KG/M2 | TEMPERATURE: 97.2 F | DIASTOLIC BLOOD PRESSURE: 73 MMHG | SYSTOLIC BLOOD PRESSURE: 114 MMHG | HEIGHT: 65 IN | HEART RATE: 82 BPM | WEIGHT: 127 LBS | OXYGEN SATURATION: 100 %

## 2020-10-05 PROCEDURE — 99214 OFFICE O/P EST MOD 30 MIN: CPT | Performed by: CLINICAL NURSE SPECIALIST

## 2020-10-05 NOTE — PROGRESS NOTES
Odalys Kimbrough is a 30 y.o. right handed woman    Patient reports her neurological difficulties began [address] she was [address]. It was Christmastime and her mother was wrapping presents when suddenly, Fany Hook let out a loud sound. Her mother when racing into her room to find out what happened and found her displaying GTC movements. She was carried into the car and driven to the hospital. En route to the hospital, she stopped shaking and gradually awakened. She was seen by numerous clinicians and neurologist at that time and underwent copious amounts of testing. 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 She was started on antiepileptic medications at that time and after trialing numerous AEDs -- including generic Keppra and name brand Keppra -- she was started on Keppra XR 1000mg twice daily. 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 [facility]     We had her follow with Dr Katelynn Mosley upon learning she was pregnant in the fall 2018   She suffered 2 miscarriages r/t thrombophilia and has remained on ASA and Lovenox    She delivered well with little issues    Daughter was early (27 weeks) but did not NICU and was discharged on time    No new neuro complaints  Typically obtains the same generic  of Keppra XR    No chest pain or palpitations  No SOB  No vertigo, lightheadedness or loss of consciousness  No falls, tripping or stumbling  No incontinence of bowels or bladder  No itching or bruising appreciated  No numbness, tingling or focal arm/leg weakness    ROS otherwise negative     Prior to Visit Medications    Medication Sig Taking?  Authorizing Provider   vitamin D (ERGOCALCIFEROL) 1.25 MG (07407 UT) CAPS capsule Take 1 capsule by mouth once a week Yes Shiva Mcqueen MD   levETIRAcetam (KEPPRA XR) 500 MG TB24 extended release tablet TAKE TWO TABLETS BY MOUTH TWICE A DAY Yes LYDIA Greenfield - CNS   ibuprofen (ADVIL;MOTRIN) 600 MG tablet Take 1 tablet by mouth every 6 hours as needed for Pain Yes Yari Saenz DO   folic acid (FOLVITE) 1 MG tablet TAKE 1 TABLET BY MOUTH 2 TIMES A DAY Yes Kimmy Bishop MD   Xlfurr-YmMgd-BwKjo-Meth-FA-DHA (PRENATE MINI) 18-0.6-0.4-350 MG CAPS Take 1 tablet by mouth daily Yes Kimmy Bishop MD   aspirin 81 MG tablet Take 81 mg by mouth daily Yes Historical Provider, MD     Allergies as of 10/05/2020    (No Known Allergies)       Objective:     /73 (Site: Right Upper Arm, Position: Sitting, Cuff Size: Medium Adult)   Pulse 82   Temp 97.2 °F (36.2 °C)   Resp 20   Ht 5' 5\" (1.651 m)   Wt 127 lb (57.6 kg)   SpO2 100%   BMI 21.13 kg/m²      General appearance: alert, appears stated age and cooperative  Head: Normocephalic, without obvious abnormality, atraumatic  Extremities: normal without edema no clubbing or cyanosis   Pulses: 2+ and symmetric  Skin: no rashes or lesions     Mental Status: Alert, oriented, thought content appropriate    Speech: clear  Language: appropriate     Cranial Nerves:  I: smell    II: visual acuity     II: visual fields Full    II: pupils YUKO   III,VII: ptosis None   III,IV,VI: extraocular muscles  EOMI without nystagmus    V: mastication Normal   V: facial light touch sensation  Normal   V,VII: corneal reflex  Present   VII: facial muscle function - upper     VII: facial muscle function - lower Normal   VIII: hearing Normal   IX: soft palate elevation  Normal   IX,X: gag reflex Present   XI: trapezius strength  5/5   XI: sternocleidomastoid strength 5/5   XI: neck extension strength  5/5   XII: tongue strength  Normal     Motor:  5/5 throughout  Normal bulk and tone     Sensory:  LT normal  Vibration normal     Coordination:   FN, FFM and ANITA normal  HS normal    Gait:  Normal   Walks well on toes and heels  Romberg's unrevealing     DTR:   Right Brachioradialis reflex 1+  Left Brachioradialis reflex 1+  Right Biceps reflex 2+  Left Biceps reflex 2+  Right Quadriceps reflex 2+  Left Quadriceps reflex 2+  Right Achilles reflex 1+  Left Achilles reflex 1+    No Ambrosio's     Laboratory/Radiology:     CBC with Differential:    Lab Results   Component Value Date    WBC 5.0 2020    RBC 4.80 2020    HGB 14.3 2020    HCT 43.9 2020     2020    MCV 91.5 2020    MCH 29.8 2020    MCHC 32.6 2020    RDW 12.6 2020    NRBC 0.0 02/10/2019    LYMPHOPCT 44.0 2020    MONOPCT 8.2 2020    BASOPCT 0.8 2020    MONOSABS 0.41 2020    LYMPHSABS 2.20 2020    EOSABS 0.08 2020    BASOSABS 0.04 2020     CMP:    Lab Results   Component Value Date     2020    K 4.0 2020     2020    CO2 28 2020    BUN 11 2020    CREATININE 0.5 2020    GFRAA >60 2020    LABGLOM >60 2020    GLUCOSE 82 2020    PROT 7.3 2020    LABALBU 4.5 2020    CALCIUM 9.6 2020    BILITOT 0.5 2020    ALKPHOS 56 2020    AST 16 2020    ALT 10 2020     Labs personally reviewed today     EEG per previous neurology dictation showed a left frontal sharp wave discharge consistent with a primary generalized seizure disorder     Assessment:     Primary epilepsy   Suffering with CPS with secondary generalization per history (primary epilepsy per EEG)    diagnosed many years ago   Now seizure free for many years maintained on Keppra XR 1000mg twice daily     Recent birth of daughter      Migraine headaches   Rare -- resolve with Maxalt MLT    Plan:     Continue Keppra XR     RTO 10-12 months     I spent 25 minutes with the patient, with 50% or more counseling them on their diagnosis, treatment and diagnostic workup     Alejandra Xiao  1:16 PM  10/5/2020

## 2020-11-30 DIAGNOSIS — N76.1 CHRONIC VAGINITIS: ICD-10-CM

## 2020-11-30 DIAGNOSIS — R30.0 DYSURIA: ICD-10-CM

## 2020-11-30 DIAGNOSIS — N92.6 IRREGULAR MENSES: ICD-10-CM

## 2020-11-30 DIAGNOSIS — D68.59 THROMBOPHILIA (HCC): ICD-10-CM

## 2020-11-30 LAB
HCT VFR BLD CALC: 41.5 % (ref 34–48)
HEMOGLOBIN: 14.1 G/DL (ref 11.5–15.5)
MCH RBC QN AUTO: 31.1 PG (ref 26–35)
MCHC RBC AUTO-ENTMCNC: 34 % (ref 32–34.5)
MCV RBC AUTO: 91.6 FL (ref 80–99.9)
PDW BLD-RTO: 12.7 FL (ref 11.5–15)
PLATELET # BLD: 222 E9/L (ref 130–450)
PMV BLD AUTO: 10.7 FL (ref 7–12)
RBC # BLD: 4.53 E12/L (ref 3.5–5.5)
WBC # BLD: 5.6 E9/L (ref 4.5–11.5)

## 2020-12-01 LAB
ABO/RH: NORMAL
ANTIBODY SCREEN: NORMAL
GONADOTROPIN, CHORIONIC (HCG) QUANT: ABNORMAL MIU/ML

## 2020-12-03 LAB — URINE CULTURE, ROUTINE: NORMAL

## 2020-12-08 LAB
Lab: NORMAL
REPORT: NORMAL
THIS TEST SENT TO: NORMAL

## 2020-12-09 NOTE — PROGRESS NOTES
[facility] PRE-ADMISSION TESTING INSTRUCTIONS    The Preadmission Testing patient is instructed accordingly using the following criteria (check applicable):    ARRIVAL INSTRUCTIONS:  [x] Parking the day of Surgery is located in the Main Entrance lot. Upon entering the door, make an immediate right to the surgery reception desk    [x] Bring photo ID and insurance card    [] Bring in a copy of Living will or Durable Power of  papers. [x] Please be sure to arrange for responsible adult to provide transportation to and from the hospital    [x] Please arrange for responsible adult to be with you for the 24 hour period post procedure due to having anesthesia      GENERAL INSTRUCTIONS:    [x] Nothing by mouth after midnight, including gum, candy, mints or water    [x] You may brush your teeth, but do not swallow any water    [x] Take medications as instructed with 1-2 oz of water    [x] Stop herbal supplements and vitamins 5 days prior to procedure    [x] Follow preop dosing of blood thinners per physician instructions    [] Take 1/2 dose of evening insulin, but no insulin after midnight    [] No oral diabetic medications after midnight    [] If diabetic and have low blood sugar or feel symptomatic, take 1-2oz apple juice only    [] Bring inhalers day of surgery    [] Bring C-PAP/ Bi-Pap day of surgery    [] Bring urine specimen day of surgery    [x] Shower or bath with soap, lather and rinse well, AM of Surgery, no lotion, powders or creams to surgical site    [] Follow bowel prep as instructed per surgeon    [x] No tobacco products within 24 hours of surgery     [x] No alcohol or illegal drug use within 24 hours of surgery.     [x] Jewelry, body piercing's, eyeglasses, contact lenses and dentures are not permitted into surgery (bring cases)      [x] Please do not wear any nail polish, make up or hair products on the day of surgery    [x] You can expect a call the business day prior to procedure to notify you if your arrival time changes    [x] If you receive a survey after surgery we would greatly appreciate your comments    [] Parent/guardian of a minor must accompany their child and remain on the premises  the entire time they are under our care     [] Pediatric patients may bring favorite toy, blanket or comfort item with them    [] A caregiver or family member must remain with the patient during their stay if they are mentally handicapped, have dementia, disoriented or unable to use a call light or would be a safety concern if left unattended    [x] Please notify surgeon if you develop any illness between now and time of surgery (cold, cough, sore throat, fever, nausea, vomiting) or any signs of infections  including skin, wounds, and dental.    [x]  The Outpatient Pharmacy is available to fill your prescription here on your day of surgery, ask your preop nurse for details    [] Other instructions    EDUCATIONAL MATERIALS PROVIDED:    [] PAT Preoperative Education Packet/Booklet     [] Medication List    [] Transfusion bracelet applied with instructions    [] Shower with soap, lather and rinse well, and use CHG wipes provided the evening before surgery as instructed    [] Incentive spirometer with instructions

## 2020-12-09 NOTE — PROGRESS NOTES

## 2020-12-11 ENCOUNTER — HOSPITAL ENCOUNTER (OUTPATIENT)
Age: 25
Setting detail: OUTPATIENT SURGERY
Discharge: HOME | End: 2020-12-11
Attending: OBSTETRICS & GYNECOLOGY | Admitting: OBSTETRICS & GYNECOLOGY
Payer: COMMERCIAL

## 2020-12-11 ENCOUNTER — ANESTHESIA EVENT (OUTPATIENT)
Dept: OPERATING ROOM | Age: 25
End: 2020-12-11
Payer: COMMERCIAL

## 2020-12-11 ENCOUNTER — ANESTHESIA (OUTPATIENT)
Dept: OPERATING ROOM | Age: 25
End: 2020-12-11
Payer: COMMERCIAL

## 2020-12-11 VITALS
HEART RATE: 79 BPM | SYSTOLIC BLOOD PRESSURE: 114 MMHG | RESPIRATION RATE: 18 BRPM | WEIGHT: 130 LBS | TEMPERATURE: 97.8 F | DIASTOLIC BLOOD PRESSURE: 67 MMHG | HEIGHT: 60 IN | OXYGEN SATURATION: 100 % | BODY MASS INDEX: 25.52 KG/M2

## 2020-12-11 VITALS — OXYGEN SATURATION: 100 % | TEMPERATURE: 94.5 F | DIASTOLIC BLOOD PRESSURE: 54 MMHG | SYSTOLIC BLOOD PRESSURE: 93 MMHG

## 2020-12-11 LAB
ABO/RH: NORMAL
ANTIBODY SCREEN: NORMAL
BASOPHILS ABSOLUTE: 0.04 E9/L (ref 0–0.2)
BASOPHILS RELATIVE PERCENT: 0.7 % (ref 0–2)
EOSINOPHILS ABSOLUTE: 0.08 E9/L (ref 0.05–0.5)
EOSINOPHILS RELATIVE PERCENT: 1.4 % (ref 0–6)
HCT VFR BLD CALC: 42.5 % (ref 34–48)
HEMOGLOBIN: 14.1 G/DL (ref 11.5–15.5)
IMMATURE GRANULOCYTES #: 0.02 E9/L
IMMATURE GRANULOCYTES %: 0.4 % (ref 0–5)
LYMPHOCYTES ABSOLUTE: 1.73 E9/L (ref 1.5–4)
LYMPHOCYTES RELATIVE PERCENT: 30.7 % (ref 20–42)
MCH RBC QN AUTO: 30.6 PG (ref 26–35)
MCHC RBC AUTO-ENTMCNC: 33.2 % (ref 32–34.5)
MCV RBC AUTO: 92.2 FL (ref 80–99.9)
MONOCYTES ABSOLUTE: 0.4 E9/L (ref 0.1–0.95)
MONOCYTES RELATIVE PERCENT: 7.1 % (ref 2–12)
NEUTROPHILS ABSOLUTE: 3.37 E9/L (ref 1.8–7.3)
NEUTROPHILS RELATIVE PERCENT: 59.7 % (ref 43–80)
PDW BLD-RTO: 12.4 FL (ref 11.5–15)
PLATELET # BLD: 219 E9/L (ref 130–450)
PMV BLD AUTO: 9.6 FL (ref 7–12)
RBC # BLD: 4.61 E12/L (ref 3.5–5.5)
SARS-COV-2, NAAT: NOT DETECTED
WBC # BLD: 5.6 E9/L (ref 4.5–11.5)

## 2020-12-11 PROCEDURE — 6360000002 HC RX W HCPCS: Performed by: NURSE ANESTHETIST, CERTIFIED REGISTERED

## 2020-12-11 PROCEDURE — 2580000003 HC RX 258: Performed by: OBSTETRICS & GYNECOLOGY

## 2020-12-11 PROCEDURE — 3700000000 HC ANESTHESIA ATTENDED CARE: Performed by: OBSTETRICS & GYNECOLOGY

## 2020-12-11 PROCEDURE — 3700000001 HC ADD 15 MINUTES (ANESTHESIA): Performed by: OBSTETRICS & GYNECOLOGY

## 2020-12-11 PROCEDURE — 7100000011 HC PHASE II RECOVERY - ADDTL 15 MIN: Performed by: OBSTETRICS & GYNECOLOGY

## 2020-12-11 PROCEDURE — 3600000012 HC SURGERY LEVEL 2 ADDTL 15MIN: Performed by: OBSTETRICS & GYNECOLOGY

## 2020-12-11 PROCEDURE — 3600000002 HC SURGERY LEVEL 2 BASE: Performed by: OBSTETRICS & GYNECOLOGY

## 2020-12-11 PROCEDURE — 6360000002 HC RX W HCPCS: Performed by: OBSTETRICS & GYNECOLOGY

## 2020-12-11 PROCEDURE — 86900 BLOOD TYPING SEROLOGIC ABO: CPT

## 2020-12-11 PROCEDURE — 7100000010 HC PHASE II RECOVERY - FIRST 15 MIN: Performed by: OBSTETRICS & GYNECOLOGY

## 2020-12-11 PROCEDURE — 2709999900 HC NON-CHARGEABLE SUPPLY: Performed by: OBSTETRICS & GYNECOLOGY

## 2020-12-11 PROCEDURE — 85025 COMPLETE CBC W/AUTO DIFF WBC: CPT

## 2020-12-11 PROCEDURE — 2500000003 HC RX 250 WO HCPCS: Performed by: NURSE ANESTHETIST, CERTIFIED REGISTERED

## 2020-12-11 PROCEDURE — 7100000001 HC PACU RECOVERY - ADDTL 15 MIN: Performed by: OBSTETRICS & GYNECOLOGY

## 2020-12-11 PROCEDURE — 7100000000 HC PACU RECOVERY - FIRST 15 MIN: Performed by: OBSTETRICS & GYNECOLOGY

## 2020-12-11 PROCEDURE — 86901 BLOOD TYPING SEROLOGIC RH(D): CPT

## 2020-12-11 PROCEDURE — 88305 TISSUE EXAM BY PATHOLOGIST: CPT

## 2020-12-11 PROCEDURE — 36415 COLL VENOUS BLD VENIPUNCTURE: CPT

## 2020-12-11 PROCEDURE — U0002 COVID-19 LAB TEST NON-CDC: HCPCS

## 2020-12-11 PROCEDURE — 86850 RBC ANTIBODY SCREEN: CPT

## 2020-12-11 RX ORDER — SODIUM CHLORIDE 0.9 % (FLUSH) 0.9 %
10 SYRINGE (ML) INJECTION PRN
Status: DISCONTINUED | OUTPATIENT
Start: 2020-12-11 | End: 2020-12-11 | Stop reason: HOSPADM

## 2020-12-11 RX ORDER — KETOROLAC TROMETHAMINE 30 MG/ML
INJECTION, SOLUTION INTRAMUSCULAR; INTRAVENOUS PRN
Status: DISCONTINUED | OUTPATIENT
Start: 2020-12-11 | End: 2020-12-11 | Stop reason: SDUPTHER

## 2020-12-11 RX ORDER — FENTANYL CITRATE 50 UG/ML
INJECTION, SOLUTION INTRAMUSCULAR; INTRAVENOUS PRN
Status: DISCONTINUED | OUTPATIENT
Start: 2020-12-11 | End: 2020-12-11 | Stop reason: SDUPTHER

## 2020-12-11 RX ORDER — HYDROCODONE BITARTRATE AND ACETAMINOPHEN 5; 325 MG/1; MG/1
1 TABLET ORAL
Status: DISCONTINUED | OUTPATIENT
Start: 2020-12-11 | End: 2020-12-11 | Stop reason: HOSPADM

## 2020-12-11 RX ORDER — MIDAZOLAM HYDROCHLORIDE 1 MG/ML
INJECTION INTRAMUSCULAR; INTRAVENOUS PRN
Status: DISCONTINUED | OUTPATIENT
Start: 2020-12-11 | End: 2020-12-11 | Stop reason: SDUPTHER

## 2020-12-11 RX ORDER — PROPOFOL 10 MG/ML
INJECTION, EMULSION INTRAVENOUS PRN
Status: DISCONTINUED | OUTPATIENT
Start: 2020-12-11 | End: 2020-12-11 | Stop reason: SDUPTHER

## 2020-12-11 RX ORDER — SODIUM CHLORIDE, SODIUM LACTATE, POTASSIUM CHLORIDE, CALCIUM CHLORIDE 600; 310; 30; 20 MG/100ML; MG/100ML; MG/100ML; MG/100ML
INJECTION, SOLUTION INTRAVENOUS CONTINUOUS
Status: DISCONTINUED | OUTPATIENT
Start: 2020-12-11 | End: 2020-12-11 | Stop reason: HOSPADM

## 2020-12-11 RX ORDER — ONDANSETRON 2 MG/ML
INJECTION INTRAMUSCULAR; INTRAVENOUS PRN
Status: DISCONTINUED | OUTPATIENT
Start: 2020-12-11 | End: 2020-12-11 | Stop reason: SDUPTHER

## 2020-12-11 RX ORDER — IBUPROFEN 800 MG/1
800 TABLET ORAL EVERY 8 HOURS PRN
Qty: 30 TABLET | Refills: 0 | Status: SHIPPED | OUTPATIENT
Start: 2020-12-11 | End: 2021-11-10

## 2020-12-11 RX ORDER — LIDOCAINE HYDROCHLORIDE 20 MG/ML
INJECTION, SOLUTION EPIDURAL; INFILTRATION; INTRACAUDAL; PERINEURAL PRN
Status: DISCONTINUED | OUTPATIENT
Start: 2020-12-11 | End: 2020-12-11 | Stop reason: SDUPTHER

## 2020-12-11 RX ORDER — SODIUM CHLORIDE 0.9 % (FLUSH) 0.9 %
10 SYRINGE (ML) INJECTION EVERY 12 HOURS SCHEDULED
Status: DISCONTINUED | OUTPATIENT
Start: 2020-12-11 | End: 2020-12-11 | Stop reason: HOSPADM

## 2020-12-11 RX ORDER — FENTANYL CITRATE 50 UG/ML
25 INJECTION, SOLUTION INTRAMUSCULAR; INTRAVENOUS EVERY 5 MIN PRN
Status: DISCONTINUED | OUTPATIENT
Start: 2020-12-11 | End: 2020-12-11 | Stop reason: HOSPADM

## 2020-12-11 RX ADMIN — SODIUM CHLORIDE, POTASSIUM CHLORIDE, SODIUM LACTATE AND CALCIUM CHLORIDE: 600; 310; 30; 20 INJECTION, SOLUTION INTRAVENOUS at 13:31

## 2020-12-11 RX ADMIN — Medication 2 G: at 12:55

## 2020-12-11 RX ADMIN — MIDAZOLAM 2 MG: 1 INJECTION INTRAMUSCULAR; INTRAVENOUS at 12:55

## 2020-12-11 RX ADMIN — ONDANSETRON 4 MG: 2 INJECTION INTRAMUSCULAR; INTRAVENOUS at 13:24

## 2020-12-11 RX ADMIN — PROPOFOL 200 MG: 10 INJECTION, EMULSION INTRAVENOUS at 13:05

## 2020-12-11 RX ADMIN — KETOROLAC TROMETHAMINE 30 MG: 30 INJECTION, SOLUTION INTRAMUSCULAR; INTRAVENOUS at 13:24

## 2020-12-11 RX ADMIN — FENTANYL CITRATE 50 MCG: 50 INJECTION, SOLUTION INTRAMUSCULAR; INTRAVENOUS at 13:05

## 2020-12-11 RX ADMIN — LIDOCAINE HYDROCHLORIDE 100 MG: 20 INJECTION, SOLUTION EPIDURAL; INFILTRATION; INTRACAUDAL; PERINEURAL at 13:05

## 2020-12-11 RX ADMIN — SODIUM CHLORIDE, POTASSIUM CHLORIDE, SODIUM LACTATE AND CALCIUM CHLORIDE: 600; 310; 30; 20 INJECTION, SOLUTION INTRAVENOUS at 12:55

## 2020-12-11 ASSESSMENT — PAIN DESCRIPTION - ORIENTATION
ORIENTATION: LOWER
ORIENTATION: LOWER

## 2020-12-11 ASSESSMENT — PAIN SCALES - GENERAL
PAINLEVEL_OUTOF10: 0
PAINLEVEL_OUTOF10: 3
PAINLEVEL_OUTOF10: 4

## 2020-12-11 ASSESSMENT — PULMONARY FUNCTION TESTS
PIF_VALUE: 1
PIF_VALUE: 2
PIF_VALUE: 3
PIF_VALUE: 1
PIF_VALUE: 0
PIF_VALUE: 2
PIF_VALUE: 2
PIF_VALUE: 3
PIF_VALUE: 1
PIF_VALUE: 3
PIF_VALUE: 1
PIF_VALUE: 1
PIF_VALUE: 3
PIF_VALUE: 2
PIF_VALUE: 10
PIF_VALUE: 3
PIF_VALUE: 3
PIF_VALUE: 0
PIF_VALUE: 3
PIF_VALUE: 2
PIF_VALUE: 1
PIF_VALUE: 0

## 2020-12-11 ASSESSMENT — PAIN DESCRIPTION - PAIN TYPE
TYPE: SURGICAL PAIN
TYPE: SURGICAL PAIN

## 2020-12-11 ASSESSMENT — PAIN DESCRIPTION - FREQUENCY: FREQUENCY: INTERMITTENT

## 2020-12-11 ASSESSMENT — PAIN DESCRIPTION - LOCATION
LOCATION: BACK
LOCATION: BACK

## 2020-12-11 ASSESSMENT — PAIN DESCRIPTION - DESCRIPTORS: DESCRIPTORS: CRAMPING

## 2020-12-11 ASSESSMENT — PAIN - FUNCTIONAL ASSESSMENT: PAIN_FUNCTIONAL_ASSESSMENT: 0-10

## 2020-12-11 NOTE — H&P
Attending History and Physical        CHIEF COMPLAINT:  Blighted Ovum      HISTORY OF PRESENT ILLNESS:    Marylee Borg is an 30 y.o. female who presents with blighted ovum. .. Patient has had 2 ultrasounds in the office, both confirmed blighted ovum. Patient has had history of thrombophilia for which currently she is taking aspirin and extra folic acid. Patient did have D&C previously secondary to missed AB this will be her second. Patient admits to normal bowel and bladder habits. He denies any nausea vomiting fever or chills at this time. Past Medical History:   Past Medical History:   Diagnosis Date    Missed      MTHFR (methylene THF reductase) deficiency and homocystinuria (HCC)     ARMANDO-1 4G/4G genotype     Seizures (HCC)     Tamulonus, last one     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        Past Surgical History:   Past Surgical History:   Procedure Laterality Date    DILATION AND CURETTAGE      DILATION AND CURETTAGE OF UTERUS  2018    SUCTION    MENISCECTOMY Right 16    J. Tay Claw    TONSILLECTOMY      22    VAGINA SURGERY N/A 2/10/2019    VAGINAL REPAIR performed by [facility]upam Acosta MD at [facility] L[facility]D    WISDOM TOOTH EXTRACTION          Allergies: Patient has no known allergies.      Medications:   Current Facility-Administered Medications   Medication Dose Route Frequency Provider Last Rate Last Dose    lactated ringers infusion   Intravenous Continuous Keri Horowitz MD        sodium chloride flush 0.9 % injection 10 mL  10 mL Intravenous 2 times per day Keri Horowitz MD        sodium chloride flush 0.9 % injection 10 mL  10 mL Intravenous PRN Keri Horowitz MD        ceFAZolin (ANCEF) 2 g in sterile water 20 mL IV syringe  2 g Intravenous On Call to MD Luis             Social History:   Social History     Tobacco Use    Smoking status: Never Smoker    Smokeless tobacco: Never Used   Substance Use Topics    Alcohol use: No     Alcohol/week: 0.0 standard drinks        Family History:   Family History   Problem Relation Age of Onset    High Blood Pressure Mother         migraines    Thyroid Disease Mother     Asthma Brother     Thyroid Disease Maternal Grandmother         migraines       Medications Prior to Admission:   Medications Prior to Admission: vitamin D (ERGOCALCIFEROL) 1.25 MG (37035 UT) CAPS capsule, Take 1 capsule by mouth once a week (Patient taking differently: Take 50,000 Units by mouth once a week tuesdays)  levETIRAcetam (KEPPRA XR) 500 MG TB24 extended release tablet, TAKE TWO TABLETS BY MOUTH TWICE A DAY (Patient taking differently: Take 1,000 mg by mouth 2 times daily Take am dos 12/11)  ibuprofen (ADVIL;MOTRIN) 600 MG tablet, Take 1 tablet by mouth every 6 hours as needed for Pain  folic acid (FOLVITE) 1 MG tablet, TAKE 1 TABLET BY MOUTH 2 TIMES A DAY (Patient taking differently: Take 1 mg by mouth daily Ld 12/109)  Jrssqa-HaVdx-ItBqd-Meth-FA-DHA (PRENATE MINI) 18-0.6-0.4-350 MG CAPS, Take 1 tablet by mouth daily (Patient taking differently: Take 1 tablet by mouth daily Ld 12/09)  aspirin 81 MG tablet, Take 81 mg by mouth daily Last taken 12/08    REVIEW OF SYSTEMS:    Constitutional: negative  Eyes: negative  Ears, nose, mouth, throat, and face: negative  Respiratory: negative  Cardiovascular: negative  Gastrointestinal: negative  Genitourinary:negative  Integument/breast: negative  Hematologic/lymphatic: negative  Musculoskeletal:negative  Neurological: negative  Allergic/Immunologic: negative     PHYSICAL EXAM   /79   Pulse 96   Temp 96.4 °F (35.8 °C) (Temporal)   Resp 20   Ht 5' (1.524 m)   Wt 130 lb (59 kg)   LMP 10/17/2020   SpO2 99%   BMI 25.39 kg/m²     General appearance: alert, cooperative and in no acute distress.   Head: NCAT  Eyes: PERRLA  Chest: no skin abnormalities, no masses  Lungs: clear to auscultation bilaterally, no wheezing/rales/rhonchi  Heart: regular rate and

## 2020-12-11 NOTE — ANESTHESIA PRE PROCEDURE
Department of Anesthesiology  Preprocedure Note       Name:  Shanika Lord   Age:  30 y.o.  :  1995                                          MRN:  [id number]         Date:  2020      Surgeon: Sherri Hernandez): Brenda Quinn MD    Procedure: DILATATION AND CURETTAGE SUCTION (N/A )    Medications prior to admission:   Prior to Admission medications    Medication Sig Start Date End Date Taking?  Authorizing Provider   vitamin D (ERGOCALCIFEROL) 1.25 MG (86341 UT) CAPS capsule Take 1 capsule by mouth once a week  Patient taking differently: Take 50,000 Units by mouth once a week   Yes Frankey Sewer, MD   levETIRAcetam (KEPPRA XR) 500 MG TB24 extended release tablet TAKE TWO TABLETS BY MOUTH TWICE A DAY  Patient taking differently: Take 1,000 mg by mouth 2 times daily Take am dos 20  Yes LYDIA Grimes - CNS   ibuprofen (ADVIL;MOTRIN) 600 MG tablet Take 1 tablet by mouth every 6 hours as needed for Pain 3/27/20  Yes Georgie Workman DO   folic acid (FOLVITE) 1 MG tablet TAKE 1 TABLET BY MOUTH 2 TIMES A DAY  Patient taking differently: Take 1 mg by mouth daily Ld 20  Yes Brenda Quinn MD   Fymjvi-ZiXat-IcXkp-Meth-FA-DHA (PRENATE MINI) 18-0.6-0.4-350 MG CAPS Take 1 tablet by mouth daily  Patient taking differently: Take 1 tablet by mouth daily Ld 18  Yes Brenda Quinn MD   aspirin 81 MG tablet Take 81 mg by mouth daily Last taken    Yes Historical Provider, MD       Current medications:    Current Facility-Administered Medications   Medication Dose Route Frequency Provider Last Rate Last Dose    lactated ringers infusion   Intravenous Continuous Brenda Quinn MD        sodium chloride flush 0.9 % injection 10 mL  10 mL Intravenous 2 times per day Brenda Quinn MD        sodium chloride flush 0.9 % injection 10 mL  10 mL Intravenous PRN Brenda Quinn MD        ceFAZolin (ANCEF) 2 g in sterile water 20 mL IV syringe  2 g Intravenous On Call to MD Luis           Allergies:  No Known Allergies    Problem List:    Patient Active Problem List   Diagnosis Code    Acute medial meniscus tear of right knee S83.241A    History of recurrent miscarriages N96    [facility] D68.59    Methylenetetrahydrofolate reductase deficiency affecting pregnancy in third trimester ([facility] O99.283, E72.12    Encounter for supervision of normal first pregnancy in second trimester Z34.02    Seizure disorder during pregnancy second trimester, antepartum ([facility] O99.352, G40.909    Suspected shortening of cervix not found Z03.75    Seizure disorder during pregnancy, third trimester ([facility] O99.353, G40.909    Blood coagulation disorder complicating pregnancy, third trimester ([facility] O99.113, D68.9    Vaginal polyp N84.2    Condyloma acuminatum A63.0       Past Medical History:        Diagnosis Date    Missed      MTHFR (methylene THF reductase) deficiency and homocystinuria ([facility]     ARMANDO-1 4G/4G genotype     Seizures ([address]     Tamulonus, last one 2011    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 ([facility] 2018       Past Surgical History:        Procedure Laterality Date    DILATION AND CURETTAGE      DILATION AND CURETTAGE OF UTERUS  2018    SUCTION    MENISCECTOMY Right 16    MARVIN Martins    TONSILLECTOMY      25    VAGINA SURGERY N/A 2/10/2019    VAGINAL REPAIR performed by [facility]ates MD at [facility] L[facility]D    WISDOM TOOTH EXTRACTION         Social History:    Social History     Tobacco Use    Smoking status: Never Smoker    Smokeless tobacco: Never Used   Substance Use Topics    Alcohol use: No     Alcohol/week: 0.0 standard drinks                                Counseling given: Not Answered      Vital Signs (Current):   Vitals:    20 1433 20 1049   BP:  122/79   Pulse:  96   Resp:  20   Temp:  96.4 °F (35.8 °C)   TempSrc:  Temporal   SpO2:  99%   Weight: 130 lb (59 kg) 130 lb (59 kg) Height: 5' (1.524 m) 5' (1.524 m)                                              BP Readings from Last 3 Encounters:   12/11/20 122/79   12/09/20 136/69   11/30/20 128/82       NPO Status: Time of last liquid consumption: 2200                        Time of last solid consumption: 2200                        Date of last liquid consumption: 12/10/20                        Date of last solid food consumption: 12/10/20    BMI:   Wt Readings from Last 3 Encounters:   12/11/20 130 lb (59 kg)   12/09/20 130 lb (59 kg)   11/30/20 130 lb (59 kg)     Body mass index is 25.39 kg/m². CBC:   Lab Results   Component Value Date    WBC 5.6 11/30/2020    RBC 4.53 11/30/2020    HGB 14.1 11/30/2020    HCT 41.5 11/30/2020    MCV 91.6 11/30/2020    RDW 12.7 11/30/2020     11/30/2020       CMP:   Lab Results   Component Value Date     08/28/2020    K 4.0 08/28/2020     08/28/2020    CO2 28 08/28/2020    BUN 11 08/28/2020    CREATININE 0.5 08/28/2020    GFRAA >60 08/28/2020    LABGLOM >60 08/28/2020    GLUCOSE 82 08/28/2020    PROT 7.3 08/28/2020    CALCIUM 9.6 08/28/2020    BILITOT 0.5 08/28/2020    ALKPHOS 56 08/28/2020    AST 16 08/28/2020    ALT 10 08/28/2020       POC Tests: No results for input(s): POCGLU, POCNA, POCK, POCCL, POCBUN, POCHEMO, POCHCT in the last 72 hours.     Coags:   Lab Results   Component Value Date    PROTIME 13.3 02/10/2019    INR 1.2 02/10/2019    APTT 21.6 02/10/2019       HCG (If Applicable):   Lab Results   Component Value Date    PREGTESTUR pos 12/02/2020        ABGs: No results found for: PHART, PO2ART, BPC6SNN, RHB9UAV, BEART, N6XXXGQP     Type & Screen (If Applicable):  No results found for: LABABO, [address]    Anesthesia Evaluation  Patient summary reviewed and Nursing notes reviewed no history of anesthetic complications:   Airway: Mallampati: II  TM distance: >3 FB   Neck ROM: full  Mouth opening: > = 3 FB Dental: normal exam         Pulmonary:Negative Pulmonary ROS and normal exam breath sounds clear to auscultation                             Cardiovascular:Negative CV ROS            Rhythm: regular  Rate: normal           Beta Blocker:  Not on Beta Blocker         Neuro/Psych:   (+) seizures:,             GI/Hepatic/Renal: Neg GI/Hepatic/Renal ROS            Endo/Other:    (+) blood dyscrasia (MTHFR - Lovenox last dose 2/9/19 AM)::., .                 Abdominal:           Vascular: negative vascular ROS. Anesthesia Plan      general     ASA 3       Induction: intravenous. MIPS: Postoperative opioids intended and Prophylactic antiemetics administered. Anesthetic plan and risks discussed with patient. Plan discussed with CRNA.             [address],    12/11/2020

## 2020-12-11 NOTE — OP NOTE
PREOPERATIVE DIAGNOSES:     1.  7 week intrauterine pregnancy. 2.  blighted ovum      POSTOPERATIVE DIAGNOSES:     Same.      PROCEDURE:  SUCTION D&C  / ULTRASOUND GUIDANCE    SURGEON:  Talat Fuller MD      ESTIMATED BLOOD LOSS:  533 mL.      COMPLICATIONS:  None.         ANESTHESIA:  GET.          FINDINGS:   TISSUE TO PATHOLOGY    MIDLINE ECHO ON ULTRASOUND      DETAILS OF PROCEDURE:    Pt taken to operating room after informed consent obtained. Risks, benefits and alternatives extensively reviewed. Pt placed under anesthesia without any difficulty. Pt placed in dorsal lithotomy position, prepped and draped in a normal sterile fashion. Weighted speculum was placed in the posterior fornix and anterior lip of cervix identified. Anterior lip grabbed with single toothed tinaculum. Cervix dilated with serial buddy's dilators. Once adequate dilation achieved, a 7 mm curved suction curette was introduced and suction device activated. Multiple passes made until all the tissue evacuated. A metal curette was introduced and curettings obtained from all 4 quadrants until a gritty texture noted. A real time ultrasound was done and good mid line echo noted. All the instruments removed from pt's cervix and vagina. Needle, sponge, lap, instrument count was correct X 2. Pt wakened and taken to recovery room in a stable condition.      Electronically signed by Talat Fuller MD [address] [place] [address] on 12/11/20

## 2021-03-29 ENCOUNTER — TELEPHONE (OUTPATIENT)
Dept: NEUROLOGY | Age: 26
End: 2021-03-29

## 2021-03-29 NOTE — TELEPHONE ENCOUNTER
LM to set up Oct 2021 flaco/Rg Helms in [place]  Electronically signed by Manjeet Elizondo on 3/29/21 at 2:37 PM EDT

## 2021-04-19 DIAGNOSIS — G40.209 PARTIAL SYMPTOMATIC EPILEPSY WITH COMPLEX PARTIAL SEIZURES, NOT INTRACTABLE, WITHOUT STATUS EPILEPTICUS (HCC): ICD-10-CM

## 2021-04-19 RX ORDER — LEVETIRACETAM 500 MG/1
TABLET, EXTENDED RELEASE ORAL
Qty: 360 TABLET | Refills: 2 | Status: SHIPPED
Start: 2021-04-19 | End: 2021-11-10

## 2021-05-27 ENCOUNTER — OFFICE VISIT (OUTPATIENT)
Dept: FAMILY MEDICINE CLINIC | Age: 26
End: 2021-05-27
Payer: COMMERCIAL

## 2021-05-27 VITALS
BODY MASS INDEX: 25.82 KG/M2 | DIASTOLIC BLOOD PRESSURE: 70 MMHG | TEMPERATURE: 98 F | SYSTOLIC BLOOD PRESSURE: 104 MMHG | RESPIRATION RATE: 16 BRPM | WEIGHT: 132.2 LBS | OXYGEN SATURATION: 99 % | HEART RATE: 81 BPM

## 2021-05-27 DIAGNOSIS — M25.461 SWELLING OF RIGHT KNEE JOINT: Primary | ICD-10-CM

## 2021-05-27 PROCEDURE — 99213 OFFICE O/P EST LOW 20 MIN: CPT | Performed by: PHYSICIAN ASSISTANT

## 2021-05-27 RX ORDER — ASPIRIN 81 MG/1
81 TABLET ORAL DAILY
COMMUNITY

## 2021-05-27 ASSESSMENT — SOCIAL DETERMINANTS OF HEALTH (SDOH): HOW HARD IS IT FOR YOU TO PAY FOR THE VERY BASICS LIKE FOOD, HOUSING, MEDICAL CARE, AND HEATING?: NOT HARD AT ALL

## 2021-05-27 ASSESSMENT — PATIENT HEALTH QUESTIONNAIRE - PHQ9
SUM OF ALL RESPONSES TO PHQ QUESTIONS 1-9: 0
SUM OF ALL RESPONSES TO PHQ QUESTIONS 1-9: 0
2. FEELING DOWN, DEPRESSED OR HOPELESS: 0
1. LITTLE INTEREST OR PLEASURE IN DOING THINGS: 0

## 2021-05-27 NOTE — PROGRESS NOTES
Chief Complaint:   Knee Problem (Right knee swelling, had surgery on it in the past but no injury recently)    History of Present Illness   Source of history provided by:  patient. Virginia Thacker is a 30 y.o. 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 States she was out to eat and when she stood up, knee was swollen. She states that it is improved somewhat this morning. She has been using ibuprofen and icing the area. Denies pain or weakness of knee. States that she can just feel the swelling which is bothersome. States the swelling is located over the medial aspect mostly. This is the area where she had past knee surgery. Denies any recent injury to the knee. Denies any weakness, paresthesias, calf pain/edema, foot/ankle pain, hip pain, back pain, abrasions, fever, chills, rash, or any other symptoms. ROS    Unless otherwise stated in this report or unable to obtain because of the patient's clinical or mental status as evidenced by the medical record, this patients's positive and negative responses for Review of Systems, constitutional, psych, eyes, ENT, cardiovascular, respiratory, gastrointestinal, neurological, genitourinary, musculoskeletal, integument systems and systems related to the presenting problem are either stated in the preceding or were not pertinent or were negative for the symptoms and/or complaints related to the medical problem.[email or web address].     Past Medical History:   Past Surgical History:   Procedure Laterality Date    DILATION AND CURETTAGE      DILATION AND CURETTAGE OF UTERUS  2018    SUCTION    DILATION AND CURETTAGE OF UTERUS N/A 2020    DILATATION AND CURETTAGE SUCTION performed by Sofia Padron MD at [place] [id number] Right 12/8/16    MARVIN Coy    TONSILLECTOMY      22    VAGINA SURGERY N/A 2/10/2019    VAGINAL REPAIR performed by Alicia Lara MD at [facility]    WISDOM TOOTH EXTRACTION       Social History:  reports that she has never smoked. She has never used smokeless tobacco. She reports that she does not drink alcohol and does not use drugs. Family History: family history includes Asthma in her brother; High Blood Pressure in her mother; Thyroid Disease in her maternal grandmother and mother. Allergies: Patient has no known allergies. Physical Exam         VS:  /70   Pulse 81   Temp 98 °F (36.7 °C)   Resp 16   Wt 132 lb 3.2 oz (60 kg)   LMP 05/04/2021   SpO2 99%   BMI 25.82 kg/m²     Oxygen Saturation Interpretation: Normal.    Constitutional:  Alert, development consistent with age. Lungs: CTAB without wheezing, rales, or rhonchi. CV: RRR without pathologic murmurs or gallops. Knee: Inspection: Right knee without obvious deformity. Tenderness:  No TTP               Swelling/Effusion: Mild edema noted over medial aspect. Deformity: No obvious deformity. ROM: ROM 0º-120º with mild to moderate discomfort. Skin:  No bruising noted. No abrasions, rashes, or erythema noted. Crepitus: No crepitus noted with flexion and extension. Hip:            Tenderness:  No TTP.              ROM: FROM hip without pain. Joint(s) Below: Right calf/ankle/foot                Tenderness:  No TTP over calf, ankle, or foot. No edema noted. Negative Galina's sign. ROM: FROM without pain or deficits. Neurovascular:             Sensory deficit: Sensation intact            Pulse deficit: Pulses 2+ and bounding. Capillary refill: Less then 2 sec throughout.   Gait:  Slightly antalgic gait, but able to bear weight with mild difficulty. Lymphatics: No lymphangitis or adenopathy noted. Neurological:  Alert and oriented. Motor functions intact. Lab / Imaging Results   (All laboratory and radiology results have been personally reviewed by myself)  Labs:    Imaging: All Radiology results interpreted by Radiologist unless otherwise noted. Assessment / Plan     Impression(s):  1. Swelling of right knee joint      Disposition:  Disposition: Discharge to home    Recommended continuing conservative treatment and if no improvement, will order imaging. RICE protocol advised. F/u with PCP in 1-2 weeks if symptoms persist. To call or to ED sooner if symptoms worsen or change. ED immediately with any calf pain/swelling, severe/worsening knee pain, paresthesias, weakness, fever, CP, or SOB. Pt states understanding and is in agreement with this care plan. All questions answered.

## 2021-06-01 ENCOUNTER — PATIENT MESSAGE (OUTPATIENT)
Dept: FAMILY MEDICINE CLINIC | Age: 26
End: 2021-06-01

## 2021-06-01 DIAGNOSIS — M25.561 RECURRENT PAIN OF RIGHT KNEE: Primary | ICD-10-CM

## 2021-06-02 RX ORDER — FOLIC ACID 1 MG/1
TABLET ORAL
Qty: 120 TABLET | Refills: 3 | Status: SHIPPED | OUTPATIENT
Start: 2021-06-02

## 2021-06-02 RX ORDER — ERGOCALCIFEROL 1.25 MG/1
50000 CAPSULE ORAL WEEKLY
Qty: 12 CAPSULE | Refills: 2 | Status: SHIPPED
Start: 2021-06-02 | End: 2021-09-16

## 2021-06-02 NOTE — TELEPHONE ENCOUNTER
Medication Refill Request    LOV 5/27/2021  NOV Visit date not found    Lab Results   Component Value Date    CREATININE 0.5 08/28/2020

## 2021-06-02 NOTE — TELEPHONE ENCOUNTER
From: Mayte May  To: Darwin Irving MD  Sent: 6/1/2021 5:51 AM EDT  Subject: Prescription Question    I need refills on my folic acid and my vitamin d please send to [facility] thank u

## 2021-06-04 ENCOUNTER — HOSPITAL ENCOUNTER (OUTPATIENT)
Age: 26
Discharge: HOME | End: 2021-06-06
Payer: COMMERCIAL

## 2021-06-04 ENCOUNTER — HOSPITAL ENCOUNTER (OUTPATIENT)
Dept: GENERAL RADIOLOGY | Age: 26
Discharge: HOME | End: 2021-06-06
Payer: COMMERCIAL

## 2021-06-04 DIAGNOSIS — M25.561 RECURRENT PAIN OF RIGHT KNEE: ICD-10-CM

## 2021-06-04 PROCEDURE — 73562 X-RAY EXAM OF KNEE 3: CPT

## 2021-07-28 ENCOUNTER — TELEPHONE (OUTPATIENT)
Dept: FAMILY MEDICINE CLINIC | Age: 26
End: 2021-07-28

## 2021-07-28 ENCOUNTER — OFFICE VISIT (OUTPATIENT)
Dept: FAMILY MEDICINE CLINIC | Age: 26
End: 2021-07-28
Payer: COMMERCIAL

## 2021-07-28 VITALS
TEMPERATURE: 96.8 F | RESPIRATION RATE: 18 BRPM | OXYGEN SATURATION: 97 % | HEIGHT: 60 IN | HEART RATE: 111 BPM | DIASTOLIC BLOOD PRESSURE: 76 MMHG | BODY MASS INDEX: 26.7 KG/M2 | SYSTOLIC BLOOD PRESSURE: 110 MMHG | WEIGHT: 136 LBS

## 2021-07-28 DIAGNOSIS — J02.9 PHARYNGITIS, UNSPECIFIED ETIOLOGY: ICD-10-CM

## 2021-07-28 DIAGNOSIS — J06.9 VIRAL URI: ICD-10-CM

## 2021-07-28 DIAGNOSIS — R50.9 FEVER, UNSPECIFIED FEVER CAUSE: Primary | ICD-10-CM

## 2021-07-28 LAB
INFLUENZA A ANTIBODY: NEGATIVE
INFLUENZA B ANTIBODY: NEGATIVE
Lab: NORMAL
PERFORMING INSTRUMENT: NORMAL
QC PASS/FAIL: NORMAL
SARS-COV-2, POC: NORMAL

## 2021-07-28 PROCEDURE — 87804 INFLUENZA ASSAY W/OPTIC: CPT | Performed by: FAMILY MEDICINE

## 2021-07-28 PROCEDURE — 87426 SARSCOV CORONAVIRUS AG IA: CPT | Performed by: FAMILY MEDICINE

## 2021-07-28 PROCEDURE — 99213 OFFICE O/P EST LOW 20 MIN: CPT | Performed by: FAMILY MEDICINE

## 2021-07-28 PROCEDURE — G8419 CALC BMI OUT NRM PARAM NOF/U: HCPCS | Performed by: FAMILY MEDICINE

## 2021-07-28 PROCEDURE — G8427 DOCREV CUR MEDS BY ELIG CLIN: HCPCS | Performed by: FAMILY MEDICINE

## 2021-07-28 PROCEDURE — 1036F TOBACCO NON-USER: CPT | Performed by: FAMILY MEDICINE

## 2021-07-28 RX ORDER — CHLORPHENIRAMINE MALEATE 4 MG/1
4 TABLET ORAL
Qty: 30 TABLET | Refills: 4 | Status: SHIPPED | OUTPATIENT
Start: 2021-07-28 | End: 2021-08-27

## 2021-07-28 RX ORDER — METHYLPREDNISOLONE 4 MG/1
TABLET ORAL
Qty: 1 KIT | Refills: 0 | Status: SHIPPED
Start: 2021-07-28 | End: 2021-07-28 | Stop reason: CLARIF

## 2021-07-28 RX ORDER — AMOXICILLIN 875 MG/1
875 TABLET, COATED ORAL 2 TIMES DAILY
Qty: 14 TABLET | Refills: 0 | Status: SHIPPED | OUTPATIENT
Start: 2021-07-28 | End: 2021-08-04

## 2021-07-28 RX ORDER — LIDOCAINE HYDROCHLORIDE 20 MG/ML
10 SOLUTION OROPHARYNGEAL
Qty: 200 ML | Refills: 0 | Status: SHIPPED
Start: 2021-07-28 | End: 2021-09-16

## 2021-07-28 ASSESSMENT — ENCOUNTER SYMPTOMS
RESPIRATORY NEGATIVE: 1
RHINORRHEA: 1
EYE PAIN: 1
GASTROINTESTINAL NEGATIVE: 1
TROUBLE SWALLOWING: 1
SORE THROAT: 1
SINUS PAIN: 1

## 2021-07-28 NOTE — PROGRESS NOTES
to the emergency department. Patient voiced understanding. No follow-ups on file. Subjective   SUBJECTIVE/OBJECTIVE:  HPI  Presents today for evaluation of several day history of headache, fever of 102, and congestion. Patient had been exposed potentially last week through family members. Has been taking over-the-counter medication without any significant change in symptoms. Denies any loss of taste or smell. Denies any nausea, vomiting, or diarrhea. Review of Systems   Constitutional: Positive for fever. HENT: Positive for postnasal drip, rhinorrhea, sinus pain, sore throat and trouble swallowing. Eyes: Positive for pain. Respiratory: Negative. Cardiovascular: Negative. Gastrointestinal: Negative. Musculoskeletal: Negative for neck pain. Skin: Negative for rash. Neurological: Negative for headaches. Hematological: Negative for adenopathy. All other systems reviewed and are negative.          Current Outpatient Medications:     amoxicillin (AMOXIL) 875 MG tablet, Take 1 tablet by mouth 2 times daily for 7 days, Disp: 14 tablet, Rfl: 0    methylPREDNISolone (MEDROL DOSEPACK) 4 MG tablet, Take by mouth., Disp: 1 kit, Rfl: 0    lidocaine viscous hcl (XYLOCAINE) 2 % SOLN solution, Take 10 mLs by mouth every 3 hours as needed for Irritation Gargle and spit, Disp: 200 mL, Rfl: 0    chlorpheniramine (CHLOR-TRIMETON) 4 MG tablet, Take 1 tablet by mouth every 8-12 hours as needed for Rhinitis, Disp: 30 tablet, Rfl: 4    folic acid (FOLVITE) 1 MG tablet, TAKE 1 TABLET BY MOUTH 2 TIMES A DAY, Disp: 120 tablet, Rfl: 3    aspirin 81 MG EC tablet, Take 81 mg by mouth daily, Disp: , Rfl:     levETIRAcetam (KEPPRA XR) 500 MG TB24 extended release tablet, TAKE TWO TABLETS BY MOUTH TWICE A DAY, Disp: 360 tablet, Rfl: 2    ibuprofen (ADVIL;MOTRIN) 800 MG tablet, Take 1 tablet by mouth every 8 hours as needed for Pain, Disp: 30 tablet, Rfl: 0    Xsnkhu-LrNhn-CmZbe-Meth-FA-DHA (PRENATE MINI) 18-0.6-0.4-350 MG CAPS, Take 1 tablet by mouth daily (Patient taking differently: Take 1 tablet by mouth daily Ld ), Disp: 30 capsule, Rfl: 12    vitamin D (ERGOCALCIFEROL) 1.25 MG (27105 UT) CAPS capsule, Take 1 capsule by mouth once a week (Patient not taking: Reported on 2021), Disp: 12 capsule, Rfl: 2    diclofenac (VOLTAREN) 50 MG EC tablet, Take 1 tablet by mouth 2 times daily (with meals) (Patient not taking: Reported on 2021), Disp: 20 tablet, Rfl: 0   Patient Active Problem List   Diagnosis    Acute medial meniscus tear of right knee    History of recurrent miscarriages    Thrombophilia ([facility]    Methylenetetrahydrofolate reductase deficiency affecting pregnancy in third trimester ([facility]    Encounter for supervision of normal first pregnancy in second trimester    Seizure disorder during pregnancy second trimester, antepartum ([facility]    Suspected shortening of cervix not found    Seizure disorder during pregnancy, third trimester ([facility]    Blood coagulation disorder complicating pregnancy, third trimester ([address]    Vaginal polyp    Condyloma acuminatum    Fever    Pharyngitis    Viral URI     Past Medical History:   Diagnosis Date    Missed      MTHFR (methylene THF reductase) deficiency and homocystinuria ([facility]     ARMANDO-1 4G/4G genotype     Seizures ([address]     Tamulonus, last one     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 ([facility] 2018     Past Surgical History:   Procedure Laterality Date    DILATION AND CURETTAGE      DILATION AND CURETTAGE OF UTERUS  2018    SUCTION    DILATION AND CURETTAGE OF UTERUS N/A 2020    DILATATION AND CURETTAGE SUCTION performed by Elsy Powers MD at [facility] Right 16    MARVIN Loya    TONSILLECTOMY      25    VAGINA SURGERY N/A 2/10/2019    VAGINAL REPAIR performed by Juan Tyson MD at [facility] L[facility]D    WISDOM TOOTH EXTRACTION       Social History     Socioeconomic History    Marital status:      Spouse name: Not on file    Number of children: 0    Years of education: Not on file    Highest education level: Not on file   Occupational History    Occupation: RN [employer]   Tobacco Use    Smoking status: Never Smoker    Smokeless tobacco: Never Used   Vaping Use    Vaping Use: Never used   Substance and Sexual Activity    Alcohol use: No     Alcohol/week: 0.0 standard drinks    Drug use: No    Sexual activity: Yes     Partners: Male   Other Topics Concern    Not on file   Social History Narrative    Not on file     Social Determinants of Health     Financial Resource Strain: Low Risk     Difficulty of Paying Living Expenses: Not hard at all   Food Insecurity: No Food Insecurity    Worried About [address] [employer] in the Last Year: Never true    [address] [email or web address] in the Last Year: Never true   Transportation Needs: No Transportation Needs    Lack of Transportation (Medical): No    Lack of Transportation (Non-Medical): No   Physical Activity:     Days of Exercise per Week:     Minutes of Exercise per Session:    Stress:     Feeling of Stress :    Social Connections:     Frequency of Communication with Friends and Family:     Frequency of Social Gatherings with Friends and Family:     Attends [employer] Services:     Active Member of Clubs or Organizations:     Attends Club or Organization Meetings:     Marital Status:    Intimate Partner Violence:     Fear of Current or Ex-Partner:     Emotionally Abused:     Physically Abused:     Sexually Abused:      Family History   Problem Relation Age of Onset    High Blood Pressure Mother         migraines    Thyroid Disease Mother     Asthma Brother     Thyroid Disease Maternal Grandmother         migraines      There are no preventive care reminders to display for this patient. There are no preventive care reminders to display for this patient.    There are no preventive care reminders to display for this patient. There are no preventive care reminders to display for this patient. Health Maintenance   Topic Date Due    Flu vaccine (1) 09/01/2021    Cervical cancer screen  03/19/2023    DTaP/Tdap/Td vaccine (3 - Td or Tdap) 01/16/2029    COVID-19 Vaccine  Completed    Hepatitis C screen  Completed    HIV screen  Completed    Hepatitis A vaccine  Aged Out    Hepatitis B vaccine  Aged Out    Hib vaccine  Aged Out    Meningococcal (ACWY) vaccine  Aged Out    Pneumococcal 0-64 years Vaccine  Aged Out    Varicella vaccine  Discontinued      There are no preventive care reminders to display for this patient. There are no preventive care reminders to display for this patient. /76   Pulse 111   Temp 96.8 °F (36 °C)   Resp 18   Ht 5' (1.524 m)   Wt 136 lb (61.7 kg)   SpO2 97%   BMI 26.56 kg/m²     Objective   Physical Exam  Vitals reviewed. Constitutional:       Appearance: She is well-developed. She is ill-appearing. HENT:      Head: Normocephalic and atraumatic. Right Ear: Hearing and tympanic membrane normal.      Left Ear: Hearing and tympanic membrane normal.      Nose: Nose normal.      Mouth/Throat:      Dentition: Normal dentition. Pharynx: Posterior oropharyngeal erythema present. No oropharyngeal exudate. Tonsils: No tonsillar exudate. Eyes:      Conjunctiva/sclera: Conjunctivae normal.      Pupils: Pupils are equal, round, and reactive to light. Cardiovascular:      Rate and Rhythm: Regular rhythm. Tachycardia present. Heart sounds: Normal heart sounds. No murmur heard. Pulmonary:      Effort: Pulmonary effort is normal. No respiratory distress. Breath sounds: Normal breath sounds. No wheezing. Abdominal:      General: Bowel sounds are normal. There is no distension. Palpations: Abdomen is soft. Musculoskeletal:      Cervical back: Normal range of motion and neck supple. Lymphadenopathy:      Cervical: Cervical adenopathy present. Skin:     General: Skin is warm and dry. Findings: No rash. Neurological:      Mental Status: She is alert. Psychiatric:         Behavior: Behavior normal.                  An electronic signature was used to authenticate this note.     --Claudia Izquierdo DO

## 2021-07-28 NOTE — TELEPHONE ENCOUNTER
----- Message from Tracie Landeros DO sent at 7/28/2021  9:31 AM EDT -----  Call and cancel the Medrol Dosepak

## 2021-08-04 ENCOUNTER — OFFICE VISIT (OUTPATIENT)
Dept: NEUROLOGY | Age: 26
End: 2021-08-04
Payer: COMMERCIAL

## 2021-08-04 VITALS
SYSTOLIC BLOOD PRESSURE: 123 MMHG | OXYGEN SATURATION: 98 % | WEIGHT: 135 LBS | BODY MASS INDEX: 26.5 KG/M2 | RESPIRATION RATE: 22 BRPM | TEMPERATURE: 98.1 F | DIASTOLIC BLOOD PRESSURE: 74 MMHG | HEIGHT: 60 IN | HEART RATE: 96 BPM

## 2021-08-04 DIAGNOSIS — G40.209 PARTIAL SYMPTOMATIC EPILEPSY WITH COMPLEX PARTIAL SEIZURES, NOT INTRACTABLE, WITHOUT STATUS EPILEPTICUS (HCC): Primary | ICD-10-CM

## 2021-08-04 PROCEDURE — 99213 OFFICE O/P EST LOW 20 MIN: CPT | Performed by: CLINICAL NURSE SPECIALIST

## 2021-08-04 PROCEDURE — 1036F TOBACCO NON-USER: CPT | Performed by: CLINICAL NURSE SPECIALIST

## 2021-08-04 PROCEDURE — G8427 DOCREV CUR MEDS BY ELIG CLIN: HCPCS | Performed by: CLINICAL NURSE SPECIALIST

## 2021-08-04 PROCEDURE — G8419 CALC BMI OUT NRM PARAM NOF/U: HCPCS | Performed by: CLINICAL NURSE SPECIALIST

## 2021-08-04 NOTE — PROGRESS NOTES
tablet by mouth 2 times daily for 7 days Yes Lan Izquierdo,    lidocaine viscous hcl (XYLOCAINE) 2 % SOLN solution Take 10 mLs by mouth every 3 hours as needed for Irritation Gargle and spit Yes Lan Izquierdo DO   chlorpheniramine (CHLOR-TRIMETON) 4 MG tablet Take 1 tablet by mouth every 8-12 hours as needed for Rhinitis Yes Lan Izquierdo DO   folic acid (FOLVITE) 1 MG tablet TAKE 1 TABLET BY MOUTH 2 TIMES A DAY Yes Juan Wallace MD   vitamin D (ERGOCALCIFEROL) 1.25 MG (97608 UT) CAPS capsule Take 1 capsule by mouth once a week Yes Juan Wallace MD   diclofenac (VOLTAREN) 50 MG EC tablet Take 1 tablet by mouth 2 times daily (with meals) Yes Juan Wallace MD   aspirin 81 MG EC tablet Take 81 mg by mouth daily Yes Neema Armenta MD   levETIRAcetam (KEPPRA XR) 500 MG TB24 extended release tablet TAKE TWO TABLETS BY MOUTH TWICE A DAY Yes LYDIA Alfredo - CNS   ibuprofen (ADVIL;MOTRIN) 800 MG tablet Take 1 tablet by mouth every 8 hours as needed for Pain Yes Marcia Lacey MD   Djsarg-CnXtb-EtTai-Meth-FA-DHA (PRENATE MINI) 18-0.6-0.4-350 MG CAPS Take 1 tablet by mouth daily  Patient taking differently: Take 1 tablet by mouth daily Ld 12/09 Yes Marcia Lacey MD     Allergies as of 08/04/2021    (No Known Allergies)       Objective:     /74 (Site: Right Upper Arm, Position: Sitting, Cuff Size: Medium Adult)   Pulse 96   Temp 98.1 °F (36.7 °C)   Resp 22   Ht 5' (1.524 m)   Wt 135 lb (61.2 kg)   SpO2 98%   BMI 26.37 kg/m²      General appearance: alert, appears stated age and cooperative  Head: Normocephalic, without obvious abnormality, atraumatic  Extremities: normal without edema no clubbing or cyanosis   Pulses: 2+ and symmetric  Skin: no rashes or lesions     Mental Status: Alert, oriented, thought content appropriate    Speech: clear  Language: appropriate     Cranial Nerves:  I: smell    II: visual acuity     II: visual fields Full    II: pupils YUKO   III,VII: ptosis None   III,IV,VI: extraocular muscles  EOMI without nystagmus    V: mastication Normal   V: facial light touch sensation  Normal   V,VII: corneal reflex  Present   VII: facial muscle function - upper     VII: facial muscle function - lower Normal   VIII: hearing Normal   IX: soft palate elevation  Normal   IX,X: gag reflex    XI: trapezius strength  5/5   XI: sternocleidomastoid strength 5/5   XI: neck extension strength  5/5   XII: tongue strength  Normal     Motor:  5/5 throughout  Normal bulk and tone     Sensory:  LT normal  Vibration normal     Coordination:   FN, FFM and ANITA normal    Gait:  Normal     DTR:   Right Brachioradialis reflex 1+  Left Brachioradialis reflex 1+  Right Biceps reflex 2+  Left Biceps reflex 2+  Right Quadriceps reflex 2+  Left Quadriceps reflex 2+  Right Achilles reflex 1+  Left Achilles reflex 1+    No Ambrosio's     Laboratory/Radiology:     CBC with Differential:    Lab Results   Component Value Date    WBC 5.6 12/11/2020    RBC 4.61 12/11/2020    HGB 14.1 12/11/2020    HCT 42.5 12/11/2020     12/11/2020    MCV 92.2 12/11/2020    MCH 30.6 12/11/2020    MCHC 33.2 12/11/2020    RDW 12.4 12/11/2020    NRBC 0.0 02/10/2019    LYMPHOPCT 30.7 12/11/2020    MONOPCT 7.1 12/11/2020    BASOPCT 0.7 12/11/2020    MONOSABS 0.40 12/11/2020    LYMPHSABS 1.73 12/11/2020    EOSABS 0.08 12/11/2020    BASOSABS 0.04 12/11/2020     CMP:    Lab Results   Component Value Date     08/28/2020    K 4.0 08/28/2020     08/28/2020    CO2 28 08/28/2020    BUN 11 08/28/2020    CREATININE 0.5 08/28/2020    GFRAA >60 08/28/2020    LABGLOM >60 08/28/2020    GLUCOSE 82 08/28/2020    PROT 7.3 08/28/2020    LABALBU 4.5 08/28/2020    CALCIUM 9.6 08/28/2020    BILITOT 0.5 08/28/2020    ALKPHOS 56 08/28/2020    AST 16 08/28/2020    ALT 10 08/28/2020     Labs personally reviewed today     EEG per previous neurology dictation showed a left frontal sharp wave discharge consistent with a primary generalized seizure disorder     Assessment:     Primary epilepsy   Suffering with CPS with secondary generalization per history (primary epilepsy per EEG)    diagnosed many years ago   Now seizure free for many years maintained on Keppra XR 1000mg twice daily     Recent birth of daughter      Migraine headaches   Rare -- resolve with Maxalt MLT    Plan:     Continue Keppra XR     RTO 10-12 months       LYDIA Ames - CNS  12:18 PM  2021

## 2021-08-23 PROBLEM — O99.111 THROMBOPHILIA AFFECTING PREGNANCY IN FIRST TRIMESTER, ANTEPARTUM (HCC): Status: ACTIVE | Noted: 2021-08-23

## 2021-08-23 PROBLEM — D68.59 THROMBOPHILIA AFFECTING PREGNANCY IN FIRST TRIMESTER, ANTEPARTUM (HCC): Status: ACTIVE | Noted: 2021-08-23

## 2021-11-10 DIAGNOSIS — G40.209 PARTIAL SYMPTOMATIC EPILEPSY WITH COMPLEX PARTIAL SEIZURES, NOT INTRACTABLE, WITHOUT STATUS EPILEPTICUS (HCC): ICD-10-CM

## 2021-11-10 RX ORDER — LEVETIRACETAM 500 MG/1
TABLET, EXTENDED RELEASE ORAL
Qty: 360 TABLET | Refills: 1 | Status: SHIPPED
Start: 2021-11-10 | End: 2022-02-28

## 2021-12-03 ENCOUNTER — OFFICE VISIT (OUTPATIENT)
Dept: FAMILY MEDICINE CLINIC | Age: 26
End: 2021-12-03
Payer: COMMERCIAL

## 2021-12-03 VITALS
DIASTOLIC BLOOD PRESSURE: 74 MMHG | WEIGHT: 152.6 LBS | OXYGEN SATURATION: 97 % | BODY MASS INDEX: 29.96 KG/M2 | TEMPERATURE: 98.1 F | HEART RATE: 104 BPM | SYSTOLIC BLOOD PRESSURE: 110 MMHG | HEIGHT: 60 IN

## 2021-12-03 DIAGNOSIS — J01.40 ACUTE NON-RECURRENT PANSINUSITIS: ICD-10-CM

## 2021-12-03 DIAGNOSIS — Z20.822 ENCOUNTER FOR LABORATORY TESTING FOR COVID-19 VIRUS: ICD-10-CM

## 2021-12-03 DIAGNOSIS — J01.40 ACUTE NON-RECURRENT PANSINUSITIS: Primary | ICD-10-CM

## 2021-12-03 LAB
Lab: NORMAL
PERFORMING INSTRUMENT: NORMAL
QC PASS/FAIL: NORMAL
SARS-COV-2, POC: NORMAL

## 2021-12-03 PROCEDURE — G8427 DOCREV CUR MEDS BY ELIG CLIN: HCPCS | Performed by: PHYSICIAN ASSISTANT

## 2021-12-03 PROCEDURE — 99213 OFFICE O/P EST LOW 20 MIN: CPT | Performed by: PHYSICIAN ASSISTANT

## 2021-12-03 PROCEDURE — 87426 SARSCOV CORONAVIRUS AG IA: CPT | Performed by: PHYSICIAN ASSISTANT

## 2021-12-03 PROCEDURE — G8419 CALC BMI OUT NRM PARAM NOF/U: HCPCS | Performed by: PHYSICIAN ASSISTANT

## 2021-12-03 PROCEDURE — 1036F TOBACCO NON-USER: CPT | Performed by: PHYSICIAN ASSISTANT

## 2021-12-03 PROCEDURE — G8484 FLU IMMUNIZE NO ADMIN: HCPCS | Performed by: PHYSICIAN ASSISTANT

## 2021-12-03 RX ORDER — AMOXICILLIN 875 MG/1
875 TABLET, COATED ORAL 2 TIMES DAILY
Qty: 20 TABLET | Refills: 0 | Status: SHIPPED | OUTPATIENT
Start: 2021-12-03 | End: 2021-12-13

## 2021-12-03 NOTE — LETTER
[address] [place] [address]  [place], [address]   Phone: [phone]  Fax: [phone]    Joseph Allen. Joshua Gonzalez PA-C      12/3/2021     Patient: Nayana Ibarra   YOB: 1995       To Whom It May Concern: It is my medical opinion that Nayana Ibarra should remain out of work while acutely ill and awaiting COVID-19 test results. Return to work with no retesting should be followed if test is negative AND meets these criteria as outlined by CDC/ODH:     a. No fever without the use of fever reducers for 24 hours  b. Improvement in symptoms     If tests positive for COVID-19, needs minimum of 10 days strict quarantine, improvement of symptoms and 24 hours fever free without fever reducing medications. If you have any questions or concerns, please don't hesitate to call. Sincerely,        Joseph Allen.  CHRISTOPHER Patel

## 2021-12-03 NOTE — PROGRESS NOTES
Chief Complaint       Congestion (22 weeks pregnant), Pharyngitis, and Headache (fully vaccinated)      History of Present Illness   Source of history provided by:  patient. Lelia Zamora is a 27 y.o. old female presenting to the walk in clinic for evaluation of nasal congestion, sinus pressure, sore throat, and sinus headaches for the past 5 days. Denies any fever, chills, loss of taste or smell, CP, dyspnea, LE edema, abdominal pain, vomiting, rash, or lethargy. Denies any hx of asthma or tobacco use. Patient is mostly concerned because she is a [employer] and has been recently exposed to COVID-19 infection. She is also 22 weeks pregnant. Patient has been vaccinated for COVID-19. She has tried OTC measures with minimal symptomatic relief. Denies any decreased fetal movement, vaginal bleeding, or leaking of fluid. ROS    Unless otherwise stated in this report or unable to obtain because of the patient's clinical or mental status as evidenced by the medical record, this patients's positive and negative responses for Review of Systems, constitutional, psych, eyes, ENT, cardiovascular, respiratory, gastrointestinal, neurological, genitourinary, musculoskeletal, integument systems and systems related to the presenting problem are either stated in the preceding or were not pertinent or were negative for the symptoms and/or complaints related to the medical problem. 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 Past Surgical History:  has a past surgical history that includes Tonsillectomy; [place] tooth extraction; meniscectomy (Right, 16); Dilation and curettage of uterus (2018); Dilation & curettage; Vagina surgery (N/A, 2/10/2019); and Dilation and curettage of uterus (N/A, 2020).   Social History:  reports that she has never smoked. She has never used smokeless tobacco. She reports that she does not drink alcohol and does not use drugs. Family History: family history includes Asthma in her brother; High Blood Pressure in her mother; Thyroid Disease in her maternal grandmother and mother. Allergies: Patient has no known allergies. Physical Exam         VS:  /74   Pulse 104   Temp 98.1 °F (36.7 °C)   Ht 5' (1.524 m)   Wt 152 lb 9.6 oz (69.2 kg)   LMP 06/30/2021   SpO2 97%   BMI 29.80 kg/m²    Oxygen Saturation Interpretation: Normal.    Constitutional:  Alert, development consistent with age. NAD. Head:  NC/NT. Airway patent. Moderate TTP noted over the bilateral maxillary sinuses. Mouth: Posterior pharynx with mild erythema and clear postnasal drip. No tonsillar hypertrophy or exudate. Neck:  Normal ROM. Supple. No anterior cervical adenopathy noted. Lungs: CTAB without wheezes, rales, or rhonchi. CV:  Regular rate and rhythm, normal heart sounds, without pathological murmurs, ectopy, gallops, or rubs. Skin:  Normal turgor. Warm, dry, without visible rash. Lymphatic: No lymphangitis or adenopathy noted. Neurological:  Oriented. Motor functions intact. Lab / Imaging Results   (All laboratory and radiology results have been personally reviewed by myself)  Labs:  Results for orders placed or performed in visit on 12/03/21   POCT COVID-19, Antigen   Result Value Ref Range    SARS-COV-2, POC Not-Detected Not Detected    Lot Number [id number]     QC Pass/Fail pass     Performing Instrument BD Veritor        Imaging: All Radiology results interpreted by Radiologist unless otherwise noted. Assessment / Plan     Impression(s):  Keyur Zelaya was seen today for congestion, pharyngitis and headache. Diagnoses and all orders for this visit:    Acute non-recurrent pansinusitis  -     POCT COVID-19, Antigen  -     amoxicillin (AMOXIL) 875 MG tablet;  Take 1 tablet by mouth 2 times daily for 10 days  -     COVID-19 Ambulatory; Future    Encounter for laboratory testing for COVID-19 virus  -     POCT COVID-19, Antigen  -     COVID-19 Ambulatory; Future      Disposition:  Disposition: Discharge to home. Rapid COVID-19 testing is negative in office. Confirmatory PCR swab obtained and pending, will call with results once available. Advised cautionary self-quarantine at home in the interim. Pt should remain out of work and the general public until results are confirmed negative. Pt should also be fever free for 24 hours and symptoms should be improved overall prior to returning. Increase fluids and rest.  Prescription written for amoxicillin to cover her for acute sinusitis, side effects discussed. Additional symptomatic relief discussed including Tylenol prn pain/fever. Schedule virtual f/u with PCP in 7-10 days if symptoms persist. ED sooner if symptoms worsen or change. ED immediately with high or refractory fever, progressive SOB, dyspnea, CP, calf pain/swelling, shaking chills, vomiting, abdominal pain, lethargy, flank pain, or decreased urinary output. Pt verbalizes understanding and is in agreement with plan of care. All questions answered. Jenny Riedel Spillan, PA-C    **This report was transcribed using voice recognition software. Every effort was made to ensure accuracy; however, inadvertent computerized transcription errors may be present.

## 2021-12-05 LAB
SARS-COV-2: NOT DETECTED
SOURCE: NORMAL

## 2022-01-05 DIAGNOSIS — Z34.92 PRENATAL CARE, SECOND TRIMESTER: ICD-10-CM

## 2022-01-05 DIAGNOSIS — D68.59 THROMBOPHILIA (HCC): ICD-10-CM

## 2022-01-05 DIAGNOSIS — Z34.82 PRENATAL CARE, SUBSEQUENT PREGNANCY, SECOND TRIMESTER: ICD-10-CM

## 2022-01-05 LAB
GLUCOSE TOLERANCE SCREEN 50G: 141 MG/DL (ref 70–140)
HCT VFR BLD CALC: 38 % (ref 34–48)
HEMOGLOBIN: 12.7 G/DL (ref 11.5–15.5)
MCH RBC QN AUTO: 32.1 PG (ref 26–35)
MCHC RBC AUTO-ENTMCNC: 33.4 % (ref 32–34.5)
MCV RBC AUTO: 96 FL (ref 80–99.9)
PDW BLD-RTO: 13.2 FL (ref 11.5–15)
PLATELET # BLD: 195 E9/L (ref 130–450)
PMV BLD AUTO: 10.6 FL (ref 7–12)
RBC # BLD: 3.96 E12/L (ref 3.5–5.5)
WBC # BLD: 9 E9/L (ref 4.5–11.5)

## 2022-01-06 LAB
ANTIBODY SCREEN: NORMAL
RPR: NORMAL

## 2022-01-10 DIAGNOSIS — Z34.82 PRENATAL CARE, SUBSEQUENT PREGNANCY, SECOND TRIMESTER: ICD-10-CM

## 2022-01-10 DIAGNOSIS — D68.59 THROMBOPHILIA (HCC): ICD-10-CM

## 2022-01-10 LAB
GLUCOSE TOLERANCE TEST 1 HOUR: 101 MG/DL
GLUCOSE TOLERANCE TEST 2 HOUR: 136 MG/DL
GLUCOSE TOLERANCE TEST FASTING: 77 MG/DL

## 2022-01-24 DIAGNOSIS — Z34.93 PRENATAL CARE IN THIRD TRIMESTER: ICD-10-CM

## 2022-02-28 DIAGNOSIS — G40.209 PARTIAL SYMPTOMATIC EPILEPSY WITH COMPLEX PARTIAL SEIZURES, NOT INTRACTABLE, WITHOUT STATUS EPILEPTICUS (HCC): ICD-10-CM

## 2022-02-28 RX ORDER — LEVETIRACETAM 500 MG/1
TABLET, EXTENDED RELEASE ORAL
Qty: 360 TABLET | Refills: 1 | Status: SHIPPED
Start: 2022-02-28 | End: 2022-06-15 | Stop reason: SDUPTHER

## 2022-03-09 ENCOUNTER — OFFICE VISIT (OUTPATIENT)
Dept: FAMILY MEDICINE CLINIC | Age: 27
End: 2022-03-09
Payer: COMMERCIAL

## 2022-03-09 VITALS
WEIGHT: 168.6 LBS | RESPIRATION RATE: 18 BRPM | SYSTOLIC BLOOD PRESSURE: 98 MMHG | DIASTOLIC BLOOD PRESSURE: 60 MMHG | HEART RATE: 115 BPM | HEIGHT: 61 IN | BODY MASS INDEX: 31.83 KG/M2 | OXYGEN SATURATION: 99 % | TEMPERATURE: 98.2 F

## 2022-03-09 DIAGNOSIS — Z00.00 ENCOUNTER FOR WELL ADULT EXAM WITHOUT ABNORMAL FINDINGS: Primary | ICD-10-CM

## 2022-03-09 PROCEDURE — 99395 PREV VISIT EST AGE 18-39: CPT | Performed by: FAMILY MEDICINE

## 2022-03-09 RX ORDER — FAMOTIDINE 20 MG/1
20 TABLET, FILM COATED ORAL DAILY
COMMUNITY
End: 2022-09-23

## 2022-03-09 NOTE — PROGRESS NOTES
CC: Natasha Cerrato is a 27 y.o. yo female here for evaluation of the following medical concerns: Annual Exam      HPI:    39 weeks gestation  Follows with Dr. Sindy Mccoy    Adult Screening:  [facility] Blood pressure normotensive: Yes    Obesity (BMI 30+): No   Diabetes screen: NA    Statin for CVD events and mortality preventive medication: NA   Aspirin for CVD and colon cancer preventive medication: NA   Diet to prevent CV disease (adults with cardiovascular risk factors): NA; Patient's cardiovascular risk history includes: none   Alcohol use:   reports no history of alcohol use.  Tobacco abuse:   reports that she has never smoked. She has never used smokeless tobacco.  Depression screening: No data recorded   Annual lung cancer screening: NA    Colorectal Cancer Screening, average risk (50-75yrs): NA    Hx of CRC before age 57yrs in FDR: NA    Hep C virus infection screening: NA    Fall prevention: NA     Sexually active specific:    Sexual activity: single partner; High risk behavior: none,  Sexually transmitted infections counseling: no   Folate supplementation (reproductive age): yes   Intimate partner violence concerns (reproductive age): safe    Cervical Cancer Screening (21 - 65 years): yes;  Hx abnormal PAP: no   BRCA risk assessment and genetic counseling/testing: no personal or family hx of breast, ovarian, tubal or peritoneal ca or ancestry associated with BRCA1/2 gene mutations: None   Breast Cancer Screening: NA    Osteoporosis screening in postmenopausal women younger than 72 years at increased risk of osteoporosis / 10-year FRAX risk of major osteoporotic fracture (MOF) (without DXA) greater than that of a 75-year-old white woman without major risk factors (8.4%): NA    Osteoporosis screening: women 65 years and older: NA      Health Maintenance Due   Topic Date Due    COVID-19 Vaccine (3 - Booster for Moderna series) 07/03/2021    Flu vaccine (1) 09/01/2021     Immunization History Administered Date(s) Administered    COVID-19, Moderna, Primary or Immunocompromised, PF, 100mcg/0.5mL 2021, 2021    Influenza Vaccine, unspecified formulation 10/01/2017    Influenza Virus Vaccine 10/01/2017, 10/04/2018, 10/15/2019    Tdap (Boostrix, Adacel) 2017, 2019, 2022       Past Medical History:   Diagnosis Date    Missed      MTHFR (methylene THF reductase) deficiency and homocystinuria ([facility]     ARMANDO-1 4G/4G genotype     Seizures ([facility]     Tamulonus, last one     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 2018     Past Surgical History:   Procedure Laterality Date    DILATION AND CURETTAGE      DILATION AND CURETTAGE OF UTERUS  2018    SUCTION    DILATION AND CURETTAGE OF UTERUS N/A 2020    DILATATION AND CURETTAGE SUCTION performed by Le Alicea MD at [address] 16    MARVIN [place] TONSILLECTOMY      25    VAGINA SURGERY N/A 2/10/2019    VAGINAL REPAIR performed [facility] at [facility]D    WISDOM TOOTH EXTRACTION       Family History   Problem Relation Age of Onset    High Blood Pressure Mother         migraines    Thyroid Disease Mother     Asthma Brother     Thyroid Disease Maternal Grandmother         migraines     Social History     Tobacco Use    Smoking status: Never Smoker    Smokeless tobacco: Never Used   Vaping Use    Vaping Use: Never used   Substance Use Topics    Alcohol use: No     Alcohol/week: 0.0 standard drinks    Drug use: No     No Known Allergies  Prior to Admission medications    Medication Sig Start Date End Date Taking?  Authorizing Provider   famotidine (PEPCID) 20 MG tablet Take 20 mg by mouth daily   Yes Historical Provider, MD   levETIRAcetam (KEPPRA XR) 500 MG TB24 extended release tablet TAKE 2 TABLETS BY MOUTH TWICE A DAY 22  Yes LYDIA Gurrola - CNS   enoxaparin (LOVENOX) 40 MG/0.4ML injection Inject 0.4 mLs into the skin daily 1/31/22  Yes Jeremiah Carrion MD   Prenatal Vit-Fe Fumarate-FA (TRINATE) TABS TAKE 1 TABLET BY MOUTH EVERY DAY 10/18/21  Yes Jeremiah Carrion MD   folic acid (FOLVITE) 1 MG tablet TAKE 1 TABLET BY MOUTH 2 TIMES A DAY 6/2/21  Yes Evan Taveras MD   aspirin 81 MG EC tablet Take 81 mg by mouth daily   Yes Historical Provider, MD       Vitals:  BP 98/60   Pulse 115   Temp 98.2 °F (36.8 °C)   Resp 18   Ht 5' 0.5\" (1.537 m)   Wt 168 lb 9.6 oz (76.5 kg)   LMP 06/30/2021   SpO2 99%   BMI 32.39 kg/m²   Wt Readings from Last 3 Encounters:   03/09/22 168 lb 9.6 oz (76.5 kg)   03/02/22 164 lb (74.4 kg)   02/23/22 165 lb (74.8 kg)       Physical Exam:  Constitutional - alert, well appearing, and in no distress  Eyes - pupils equal and reactive, extraocular eye movements intact, left eye normal, right eye normal, no conjunctivitis noted  Neck - supple, no significant adenopathy; thyroid exam: thyroid is normal in size without nodules or tenderness  Respiratory- clear to auscultation, no wheezes, rales or rhonchi, symmetric air entry; no increased work of breathing  Cardiovascular - normal rate, regular rhythm, normal S1, S2, no murmurs, rubs, clicks or gallops; Extremities - no pedal edema, no clubbing or cyanosis  Abdomen - gravid  Musculoskeletal - gait normal; no clubbing, cyanosis of extremities noted; no joint deformity or swelling noted  Skin - normal coloration and turgor, no rashes, no suspicious skin lesions noted  Neurological - alert, oriented; no obvious CN deficits, normal speech  Psychiatric - alert, oriented to person, place, and time, normal mood, behavior, speech    Labs:  Pertinent labs, imaging, other diagnostic data and other clinician documentation reviewed in electronic medical record. Assessment / Plan   Diagnosis Orders   1. Encounter for well adult exam without abnormal findings         Continue ASA   F/u 3 weeks pp    RTO: Return for 3 week post partum.       An electronic signature was used to authenticate this note.  ---- Darwin Irving MD on 3/9/2022 at 9:49 AM

## 2022-03-24 ENCOUNTER — ANESTHESIA EVENT (OUTPATIENT)
Dept: LABOR AND DELIVERY | Age: 27
End: 2022-03-24
Payer: COMMERCIAL

## 2022-03-25 ENCOUNTER — ANESTHESIA (OUTPATIENT)
Dept: LABOR AND DELIVERY | Age: 27
End: 2022-03-25
Payer: COMMERCIAL

## 2022-03-25 ENCOUNTER — HOSPITAL ENCOUNTER (INPATIENT)
Age: 27
LOS: 2 days | Discharge: HOME | End: 2022-03-27
Attending: OBSTETRICS & GYNECOLOGY | Admitting: OBSTETRICS & GYNECOLOGY
Payer: COMMERCIAL

## 2022-03-25 VITALS — DIASTOLIC BLOOD PRESSURE: 46 MMHG | OXYGEN SATURATION: 100 % | SYSTOLIC BLOOD PRESSURE: 84 MMHG

## 2022-03-25 DIAGNOSIS — G89.18 ACUTE POSTOPERATIVE PAIN: Primary | ICD-10-CM

## 2022-03-25 PROBLEM — Z98.891 S/P C-SECTION: Status: ACTIVE | Noted: 2022-03-25

## 2022-03-25 LAB
ABO/RH: NORMAL
AMPHETAMINE SCREEN, URINE: NOT DETECTED
ANTIBODY SCREEN: NORMAL
BARBITURATE SCREEN URINE: NOT DETECTED
BENZODIAZEPINE SCREEN, URINE: NOT DETECTED
CANNABINOID SCREEN URINE: NOT DETECTED
COCAINE METABOLITE SCREEN URINE: NOT DETECTED
FENTANYL SCREEN, URINE: NOT DETECTED
HCT VFR BLD CALC: 39.6 % (ref 34–48)
HEMOGLOBIN: 13.4 G/DL (ref 11.5–15.5)
INR BLD: 1.1
Lab: NORMAL
MCH RBC QN AUTO: 32 PG (ref 26–35)
MCHC RBC AUTO-ENTMCNC: 33.8 % (ref 32–34.5)
MCV RBC AUTO: 94.5 FL (ref 80–99.9)
METHADONE SCREEN, URINE: NOT DETECTED
OPIATE SCREEN URINE: NOT DETECTED
OXYCODONE URINE: NOT DETECTED
PDW BLD-RTO: 13.2 FL (ref 11.5–15)
PHENCYCLIDINE SCREEN URINE: NOT DETECTED
PLATELET # BLD: 137 E9/L (ref 130–450)
PMV BLD AUTO: 10.3 FL (ref 7–12)
PROTHROMBIN TIME: 11.5 SEC (ref 9.3–12.4)
RBC # BLD: 4.19 E12/L (ref 3.5–5.5)
SARS-COV-2, NAAT: DETECTED
WBC # BLD: 7.6 E9/L (ref 4.5–11.5)

## 2022-03-25 PROCEDURE — 6370000000 HC RX 637 (ALT 250 FOR IP): Performed by: OBSTETRICS & GYNECOLOGY

## 2022-03-25 PROCEDURE — 6360000002 HC RX W HCPCS: Performed by: NURSE ANESTHETIST, CERTIFIED REGISTERED

## 2022-03-25 PROCEDURE — 85027 COMPLETE CBC AUTOMATED: CPT

## 2022-03-25 PROCEDURE — 7100000000 HC PACU RECOVERY - FIRST 15 MIN: Performed by: OBSTETRICS & GYNECOLOGY

## 2022-03-25 PROCEDURE — 1220000000 HC SEMI PRIVATE OB R&B

## 2022-03-25 PROCEDURE — 2580000003 HC RX 258: Performed by: OBSTETRICS & GYNECOLOGY

## 2022-03-25 PROCEDURE — 3609079900 HC CESAREAN SECTION: Performed by: OBSTETRICS & GYNECOLOGY

## 2022-03-25 PROCEDURE — 87635 SARS-COV-2 COVID-19 AMP PRB: CPT

## 2022-03-25 PROCEDURE — 7100000001 HC PACU RECOVERY - ADDTL 15 MIN: Performed by: OBSTETRICS & GYNECOLOGY

## 2022-03-25 PROCEDURE — 2709999900 HC NON-CHARGEABLE SUPPLY: Performed by: OBSTETRICS & GYNECOLOGY

## 2022-03-25 PROCEDURE — 86901 BLOOD TYPING SEROLOGIC RH(D): CPT

## 2022-03-25 PROCEDURE — 86850 RBC ANTIBODY SCREEN: CPT

## 2022-03-25 PROCEDURE — 36415 COLL VENOUS BLD VENIPUNCTURE: CPT

## 2022-03-25 PROCEDURE — 6360000002 HC RX W HCPCS: Performed by: OBSTETRICS & GYNECOLOGY

## 2022-03-25 PROCEDURE — 3700000000 HC ANESTHESIA ATTENDED CARE: Performed by: OBSTETRICS & GYNECOLOGY

## 2022-03-25 PROCEDURE — 88307 TISSUE EXAM BY PATHOLOGIST: CPT

## 2022-03-25 PROCEDURE — 6370000000 HC RX 637 (ALT 250 FOR IP): Performed by: ANESTHESIOLOGY

## 2022-03-25 PROCEDURE — 85610 PROTHROMBIN TIME: CPT

## 2022-03-25 PROCEDURE — 86900 BLOOD TYPING SEROLOGIC ABO: CPT

## 2022-03-25 PROCEDURE — 3700000001 HC ADD 15 MINUTES (ANESTHESIA): Performed by: OBSTETRICS & GYNECOLOGY

## 2022-03-25 PROCEDURE — 2500000003 HC RX 250 WO HCPCS: Performed by: NURSE ANESTHETIST, CERTIFIED REGISTERED

## 2022-03-25 PROCEDURE — 2580000003 HC RX 258: Performed by: NURSE ANESTHETIST, CERTIFIED REGISTERED

## 2022-03-25 PROCEDURE — 80307 DRUG TEST PRSMV CHEM ANLYZR: CPT

## 2022-03-25 RX ORDER — SIMETHICONE 80 MG
80 TABLET,CHEWABLE ORAL 4 TIMES DAILY
Status: DISCONTINUED | OUTPATIENT
Start: 2022-03-25 | End: 2022-03-27 | Stop reason: HOSPADM

## 2022-03-25 RX ORDER — MEPERIDINE HYDROCHLORIDE 25 MG/ML
12.5 INJECTION INTRAMUSCULAR; INTRAVENOUS; SUBCUTANEOUS EVERY 5 MIN PRN
Status: DISCONTINUED | OUTPATIENT
Start: 2022-03-25 | End: 2022-03-25 | Stop reason: HOSPADM

## 2022-03-25 RX ORDER — NALBUPHINE HCL 10 MG/ML
5 AMPUL (ML) INJECTION EVERY 4 HOURS PRN
Status: DISCONTINUED | OUTPATIENT
Start: 2022-03-25 | End: 2022-03-27 | Stop reason: HOSPADM

## 2022-03-25 RX ORDER — ONDANSETRON 2 MG/ML
4 INJECTION INTRAMUSCULAR; INTRAVENOUS EVERY 6 HOURS PRN
Status: DISCONTINUED | OUTPATIENT
Start: 2022-03-25 | End: 2022-03-27 | Stop reason: HOSPADM

## 2022-03-25 RX ORDER — SODIUM CHLORIDE 9 MG/ML
25 INJECTION, SOLUTION INTRAVENOUS PRN
Status: DISCONTINUED | OUTPATIENT
Start: 2022-03-25 | End: 2022-03-27 | Stop reason: HOSPADM

## 2022-03-25 RX ORDER — DIPHENHYDRAMINE HYDROCHLORIDE 50 MG/ML
12.5 INJECTION INTRAMUSCULAR; INTRAVENOUS
Status: DISCONTINUED | OUTPATIENT
Start: 2022-03-25 | End: 2022-03-25 | Stop reason: HOSPADM

## 2022-03-25 RX ORDER — BUPIVACAINE HYDROCHLORIDE 7.5 MG/ML
INJECTION, SOLUTION INTRASPINAL PRN
Status: DISCONTINUED | OUTPATIENT
Start: 2022-03-25 | End: 2022-03-25 | Stop reason: SDUPTHER

## 2022-03-25 RX ORDER — SODIUM CHLORIDE 9 MG/ML
25 INJECTION, SOLUTION INTRAVENOUS PRN
Status: DISCONTINUED | OUTPATIENT
Start: 2022-03-25 | End: 2022-03-25

## 2022-03-25 RX ORDER — MODIFIED LANOLIN
OINTMENT (GRAM) TOPICAL
Status: DISCONTINUED | OUTPATIENT
Start: 2022-03-25 | End: 2022-03-25

## 2022-03-25 RX ORDER — MORPHINE SULFATE 2 MG/ML
2 INJECTION, SOLUTION INTRAMUSCULAR; INTRAVENOUS EVERY 5 MIN PRN
Status: DISCONTINUED | OUTPATIENT
Start: 2022-03-25 | End: 2022-03-25 | Stop reason: HOSPADM

## 2022-03-25 RX ORDER — FENTANYL CITRATE 50 UG/ML
25 INJECTION, SOLUTION INTRAMUSCULAR; INTRAVENOUS EVERY 5 MIN PRN
Status: DISCONTINUED | OUTPATIENT
Start: 2022-03-25 | End: 2022-03-25 | Stop reason: HOSPADM

## 2022-03-25 RX ORDER — SODIUM CHLORIDE, SODIUM LACTATE, POTASSIUM CHLORIDE, CALCIUM CHLORIDE 600; 310; 30; 20 MG/100ML; MG/100ML; MG/100ML; MG/100ML
INJECTION, SOLUTION INTRAVENOUS CONTINUOUS
Status: DISCONTINUED | OUTPATIENT
Start: 2022-03-25 | End: 2022-03-27 | Stop reason: HOSPADM

## 2022-03-25 RX ORDER — MORPHINE SULFATE 1 MG/ML
INJECTION, SOLUTION EPIDURAL; INTRATHECAL; INTRAVENOUS PRN
Status: DISCONTINUED | OUTPATIENT
Start: 2022-03-25 | End: 2022-03-25 | Stop reason: SDUPTHER

## 2022-03-25 RX ORDER — ACETAMINOPHEN 325 MG/1
650 TABLET ORAL EVERY 4 HOURS PRN
Status: DISCONTINUED | OUTPATIENT
Start: 2022-03-25 | End: 2022-03-25

## 2022-03-25 RX ORDER — SODIUM CHLORIDE, SODIUM LACTATE, POTASSIUM CHLORIDE, AND CALCIUM CHLORIDE .6; .31; .03; .02 G/100ML; G/100ML; G/100ML; G/100ML
1000 INJECTION, SOLUTION INTRAVENOUS ONCE
Status: COMPLETED | OUTPATIENT
Start: 2022-03-25 | End: 2022-03-25

## 2022-03-25 RX ORDER — LEVETIRACETAM 500 MG/1
500 TABLET, EXTENDED RELEASE ORAL DAILY
Status: DISCONTINUED | OUTPATIENT
Start: 2022-03-25 | End: 2022-03-25 | Stop reason: DRUGHIGH

## 2022-03-25 RX ORDER — HYDROCODONE BITARTRATE AND ACETAMINOPHEN 5; 325 MG/1; MG/1
1 TABLET ORAL EVERY 6 HOURS PRN
Qty: 18 TABLET | Refills: 0 | Status: SHIPPED | OUTPATIENT
Start: 2022-03-25 | End: 2022-04-01

## 2022-03-25 RX ORDER — TRISODIUM CITRATE DIHYDRATE AND CITRIC ACID MONOHYDRATE 500; 334 MG/5ML; MG/5ML
30 SOLUTION ORAL ONCE
Status: COMPLETED | OUTPATIENT
Start: 2022-03-25 | End: 2022-03-25

## 2022-03-25 RX ORDER — HYDROCODONE BITARTRATE AND ACETAMINOPHEN 5; 325 MG/1; MG/1
2 TABLET ORAL EVERY 4 HOURS PRN
Status: DISCONTINUED | OUTPATIENT
Start: 2022-03-25 | End: 2022-03-27 | Stop reason: HOSPADM

## 2022-03-25 RX ORDER — ACETAMINOPHEN 325 MG/1
650 TABLET ORAL EVERY 4 HOURS PRN
Status: DISCONTINUED | OUTPATIENT
Start: 2022-03-25 | End: 2022-03-27 | Stop reason: HOSPADM

## 2022-03-25 RX ORDER — DOCUSATE SODIUM 100 MG/1
100 CAPSULE, LIQUID FILLED ORAL 2 TIMES DAILY
Status: DISCONTINUED | OUTPATIENT
Start: 2022-03-25 | End: 2022-03-27 | Stop reason: HOSPADM

## 2022-03-25 RX ORDER — NALOXONE HYDROCHLORIDE 0.4 MG/ML
0.4 INJECTION, SOLUTION INTRAMUSCULAR; INTRAVENOUS; SUBCUTANEOUS PRN
Status: DISCONTINUED | OUTPATIENT
Start: 2022-03-25 | End: 2022-03-27 | Stop reason: HOSPADM

## 2022-03-25 RX ORDER — SODIUM CHLORIDE 9 MG/ML
25 INJECTION, SOLUTION INTRAVENOUS PRN
Status: DISCONTINUED | OUTPATIENT
Start: 2022-03-25 | End: 2022-03-25 | Stop reason: HOSPADM

## 2022-03-25 RX ORDER — HYDROCODONE BITARTRATE AND ACETAMINOPHEN 5; 325 MG/1; MG/1
1 TABLET ORAL EVERY 4 HOURS PRN
Status: DISCONTINUED | OUTPATIENT
Start: 2022-03-25 | End: 2022-03-27 | Stop reason: HOSPADM

## 2022-03-25 RX ORDER — SODIUM CHLORIDE 0.9 % (FLUSH) 0.9 %
5-40 SYRINGE (ML) INJECTION PRN
Status: DISCONTINUED | OUTPATIENT
Start: 2022-03-25 | End: 2022-03-27 | Stop reason: HOSPADM

## 2022-03-25 RX ORDER — IBUPROFEN 800 MG/1
800 TABLET ORAL EVERY 8 HOURS PRN
Qty: 30 TABLET | Refills: 0 | Status: SHIPPED | OUTPATIENT
Start: 2022-03-25 | End: 2022-09-23

## 2022-03-25 RX ORDER — DOCUSATE SODIUM 100 MG/1
100 CAPSULE, LIQUID FILLED ORAL 2 TIMES DAILY
Status: DISCONTINUED | OUTPATIENT
Start: 2022-03-25 | End: 2022-03-25

## 2022-03-25 RX ORDER — SODIUM CHLORIDE, SODIUM LACTATE, POTASSIUM CHLORIDE, CALCIUM CHLORIDE 600; 310; 30; 20 MG/100ML; MG/100ML; MG/100ML; MG/100ML
INJECTION, SOLUTION INTRAVENOUS CONTINUOUS
Status: DISCONTINUED | OUTPATIENT
Start: 2022-03-25 | End: 2022-03-25

## 2022-03-25 RX ORDER — SODIUM CHLORIDE 0.9 % (FLUSH) 0.9 %
5-40 SYRINGE (ML) INJECTION EVERY 12 HOURS SCHEDULED
Status: DISCONTINUED | OUTPATIENT
Start: 2022-03-25 | End: 2022-03-27 | Stop reason: HOSPADM

## 2022-03-25 RX ORDER — KETOROLAC TROMETHAMINE 30 MG/ML
30 INJECTION, SOLUTION INTRAMUSCULAR; INTRAVENOUS EVERY 6 HOURS PRN
Status: DISPENSED | OUTPATIENT
Start: 2022-03-25 | End: 2022-03-26

## 2022-03-25 RX ORDER — PRENATAL WITH FERROUS FUM AND FOLIC ACID 3080; 920; 120; 400; 22; 1.84; 3; 20; 10; 1; 12; 200; 27; 25; 2 [IU]/1; [IU]/1; MG/1; [IU]/1; MG/1; MG/1; MG/1; MG/1; MG/1; MG/1; UG/1; MG/1; MG/1; MG/1; MG/1
1 TABLET ORAL DAILY
Status: DISCONTINUED | OUTPATIENT
Start: 2022-03-25 | End: 2022-03-27 | Stop reason: HOSPADM

## 2022-03-25 RX ORDER — LEVETIRACETAM 500 MG/1
1000 TABLET, EXTENDED RELEASE ORAL 2 TIMES DAILY
Status: DISCONTINUED | OUTPATIENT
Start: 2022-03-25 | End: 2022-03-27 | Stop reason: HOSPADM

## 2022-03-25 RX ORDER — SODIUM CHLORIDE 0.9 % (FLUSH) 0.9 %
5-40 SYRINGE (ML) INJECTION EVERY 12 HOURS SCHEDULED
Status: DISCONTINUED | OUTPATIENT
Start: 2022-03-25 | End: 2022-03-25 | Stop reason: HOSPADM

## 2022-03-25 RX ORDER — ACETAMINOPHEN 325 MG/1
650 TABLET ORAL EVERY 4 HOURS
Status: DISCONTINUED | OUTPATIENT
Start: 2022-03-25 | End: 2022-03-27 | Stop reason: HOSPADM

## 2022-03-25 RX ORDER — KETOROLAC TROMETHAMINE 30 MG/ML
30 INJECTION, SOLUTION INTRAMUSCULAR; INTRAVENOUS EVERY 6 HOURS
Status: COMPLETED | OUTPATIENT
Start: 2022-03-25 | End: 2022-03-26

## 2022-03-25 RX ORDER — OXYCODONE HYDROCHLORIDE 5 MG/1
5 TABLET ORAL EVERY 4 HOURS PRN
Status: DISCONTINUED | OUTPATIENT
Start: 2022-03-25 | End: 2022-03-27 | Stop reason: HOSPADM

## 2022-03-25 RX ORDER — PHENYLEPHRINE HCL IN 0.9% NACL 1 MG/10 ML
SYRINGE (ML) INTRAVENOUS PRN
Status: DISCONTINUED | OUTPATIENT
Start: 2022-03-25 | End: 2022-03-25 | Stop reason: SDUPTHER

## 2022-03-25 RX ORDER — SODIUM CHLORIDE 0.9 % (FLUSH) 0.9 %
10 SYRINGE (ML) INJECTION EVERY 12 HOURS SCHEDULED
Status: DISCONTINUED | OUTPATIENT
Start: 2022-03-25 | End: 2022-03-25

## 2022-03-25 RX ORDER — FERROUS SULFATE 325(65) MG
325 TABLET ORAL 2 TIMES DAILY WITH MEALS
Status: DISCONTINUED | OUTPATIENT
Start: 2022-03-25 | End: 2022-03-27 | Stop reason: HOSPADM

## 2022-03-25 RX ORDER — SODIUM CHLORIDE, SODIUM LACTATE, POTASSIUM CHLORIDE, CALCIUM CHLORIDE 600; 310; 30; 20 MG/100ML; MG/100ML; MG/100ML; MG/100ML
INJECTION, SOLUTION INTRAVENOUS CONTINUOUS PRN
Status: DISCONTINUED | OUTPATIENT
Start: 2022-03-25 | End: 2022-03-25 | Stop reason: SDUPTHER

## 2022-03-25 RX ORDER — SODIUM CHLORIDE 0.9 % (FLUSH) 0.9 %
5-40 SYRINGE (ML) INJECTION PRN
Status: DISCONTINUED | OUTPATIENT
Start: 2022-03-25 | End: 2022-03-25 | Stop reason: HOSPADM

## 2022-03-25 RX ORDER — BISACODYL 10 MG
10 SUPPOSITORY, RECTAL RECTAL PRN
Status: DISCONTINUED | OUTPATIENT
Start: 2022-03-25 | End: 2022-03-27 | Stop reason: HOSPADM

## 2022-03-25 RX ORDER — DIPHENHYDRAMINE HYDROCHLORIDE 50 MG/ML
12.5 INJECTION INTRAMUSCULAR; INTRAVENOUS EVERY 6 HOURS PRN
Status: DISCONTINUED | OUTPATIENT
Start: 2022-03-25 | End: 2022-03-27 | Stop reason: HOSPADM

## 2022-03-25 RX ORDER — MODIFIED LANOLIN
OINTMENT (GRAM) TOPICAL
Status: DISCONTINUED | OUTPATIENT
Start: 2022-03-25 | End: 2022-03-27 | Stop reason: HOSPADM

## 2022-03-25 RX ORDER — IBUPROFEN 800 MG/1
800 TABLET ORAL EVERY 8 HOURS PRN
Status: DISCONTINUED | OUTPATIENT
Start: 2022-03-26 | End: 2022-03-27 | Stop reason: HOSPADM

## 2022-03-25 RX ORDER — SODIUM CHLORIDE 0.9 % (FLUSH) 0.9 %
10 SYRINGE (ML) INJECTION PRN
Status: DISCONTINUED | OUTPATIENT
Start: 2022-03-25 | End: 2022-03-25

## 2022-03-25 RX ORDER — ONDANSETRON 2 MG/ML
INJECTION INTRAMUSCULAR; INTRAVENOUS PRN
Status: DISCONTINUED | OUTPATIENT
Start: 2022-03-25 | End: 2022-03-25 | Stop reason: SDUPTHER

## 2022-03-25 RX ADMIN — Medication 100 MCG: at 12:22

## 2022-03-25 RX ADMIN — SODIUM CITRATE AND CITRIC ACID MONOHYDRATE 30 ML: 500; 334 SOLUTION ORAL at 11:44

## 2022-03-25 RX ADMIN — ENOXAPARIN SODIUM 40 MG: 100 INJECTION SUBCUTANEOUS at 20:16

## 2022-03-25 RX ADMIN — Medication 100 MCG: at 12:25

## 2022-03-25 RX ADMIN — SIMETHICONE 80 MG: 80 TABLET, CHEWABLE ORAL at 20:15

## 2022-03-25 RX ADMIN — SODIUM CHLORIDE, POTASSIUM CHLORIDE, SODIUM LACTATE AND CALCIUM CHLORIDE: 600; 310; 30; 20 INJECTION, SOLUTION INTRAVENOUS at 11:02

## 2022-03-25 RX ADMIN — OXYCODONE 5 MG: 5 TABLET ORAL at 21:56

## 2022-03-25 RX ADMIN — SODIUM CHLORIDE, POTASSIUM CHLORIDE, SODIUM LACTATE AND CALCIUM CHLORIDE 1000 ML: 600; 310; 30; 20 INJECTION, SOLUTION INTRAVENOUS at 10:30

## 2022-03-25 RX ADMIN — SODIUM CHLORIDE, POTASSIUM CHLORIDE, SODIUM LACTATE AND CALCIUM CHLORIDE 125 ML/HR: 600; 310; 30; 20 INJECTION, SOLUTION INTRAVENOUS at 10:52

## 2022-03-25 RX ADMIN — KETOROLAC TROMETHAMINE 30 MG: 30 INJECTION, SOLUTION INTRAMUSCULAR at 20:15

## 2022-03-25 RX ADMIN — Medication 2000 MG: at 12:01

## 2022-03-25 RX ADMIN — Medication 909 ML/HR: at 12:27

## 2022-03-25 RX ADMIN — Medication 100 MCG: at 12:15

## 2022-03-25 RX ADMIN — KETOROLAC TROMETHAMINE 30 MG: 30 INJECTION, SOLUTION INTRAMUSCULAR at 14:32

## 2022-03-25 RX ADMIN — ACETAMINOPHEN 650 MG: 325 TABLET ORAL at 21:56

## 2022-03-25 RX ADMIN — DOCUSATE SODIUM 100 MG: 100 CAPSULE, LIQUID FILLED ORAL at 20:15

## 2022-03-25 RX ADMIN — MORPHINE SULFATE 0.15 MG: 1 INJECTION, SOLUTION EPIDURAL; INTRATHECAL; INTRAVENOUS at 12:14

## 2022-03-25 RX ADMIN — LEVETIRACETAM 1000 MG: 500 TABLET, EXTENDED RELEASE ORAL at 21:21

## 2022-03-25 RX ADMIN — Medication 100 MCG: at 12:24

## 2022-03-25 RX ADMIN — Medication 100 MCG: at 12:42

## 2022-03-25 RX ADMIN — SODIUM CHLORIDE, POTASSIUM CHLORIDE, SODIUM LACTATE AND CALCIUM CHLORIDE: 600; 310; 30; 20 INJECTION, SOLUTION INTRAVENOUS at 12:30

## 2022-03-25 RX ADMIN — OXYCODONE 5 MG: 5 TABLET ORAL at 17:47

## 2022-03-25 RX ADMIN — ONDANSETRON 4 MG: 2 INJECTION INTRAMUSCULAR; INTRAVENOUS at 12:28

## 2022-03-25 RX ADMIN — SODIUM CHLORIDE, PRESERVATIVE FREE 10 ML: 5 INJECTION INTRAVENOUS at 21:00

## 2022-03-25 RX ADMIN — BUPIVACAINE HYDROCHLORIDE 1.6 ML: 7.5 INJECTION, SOLUTION SUBARACHNOID at 12:14

## 2022-03-25 RX ADMIN — Medication 100 MCG: at 12:18

## 2022-03-25 RX ADMIN — Medication 100 MCG: at 12:38

## 2022-03-25 ASSESSMENT — PAIN DESCRIPTION - PROGRESSION: CLINICAL_PROGRESSION: GRADUALLY WORSENING

## 2022-03-25 ASSESSMENT — PAIN SCALES - GENERAL
PAINLEVEL_OUTOF10: 5
PAINLEVEL_OUTOF10: 6
PAINLEVEL_OUTOF10: 4
PAINLEVEL_OUTOF10: 4

## 2022-03-25 ASSESSMENT — PAIN DESCRIPTION - LOCATION: LOCATION: ABDOMEN;INCISION

## 2022-03-25 ASSESSMENT — PULMONARY FUNCTION TESTS
PIF_VALUE: 0

## 2022-03-25 ASSESSMENT — PAIN DESCRIPTION - DESCRIPTORS: DESCRIPTORS: BURNING;DISCOMFORT;SORE

## 2022-03-25 ASSESSMENT — PAIN - FUNCTIONAL ASSESSMENT: PAIN_FUNCTIONAL_ASSESSMENT: ACTIVITIES ARE NOT PREVENTED

## 2022-03-25 ASSESSMENT — LIFESTYLE VARIABLES: SMOKING_STATUS: 0

## 2022-03-25 ASSESSMENT — PAIN DESCRIPTION - ORIENTATION: ORIENTATION: LOWER

## 2022-03-25 ASSESSMENT — PAIN DESCRIPTION - ONSET: ONSET: GRADUAL

## 2022-03-25 ASSESSMENT — PAIN DESCRIPTION - FREQUENCY: FREQUENCY: INTERMITTENT

## 2022-03-25 ASSESSMENT — PAIN DESCRIPTION - PAIN TYPE: TYPE: ACUTE PAIN;SURGICAL PAIN

## 2022-03-25 NOTE — H&P
CHIEF COMPLAINT:  Breech, thrombophilia    HISTORY OF PRESENT ILLNESS:      The patient is a 27 y.o. female at 36w4d. OB History        4    Para   1    Term           1    AB   2    Living   1       SAB   2    IAB        Ectopic        Molar        Multiple   0    Live Births   1            Patient presents with a chief complaint as above and is being admitted for   without tubal ligation    Estimated Due Date: Estimated Date of Delivery: 22    PRENATAL CARE:    Complicated by: breech,thrombophilia    PAST OB HISTORY  OB History        4    Para   1    Term           1    AB   2    Living   1       SAB   2    IAB        Ectopic        Molar        Multiple   0    Live Births   1                Past Medical History:        Diagnosis Date    Disease of blood and blood forming organ     mthfr     Missed      MTHFR (methylene THF reductase) deficiency and homocystinuria ([facility] [address]     ARMANDO-1 4G/4G genotype     Postpartum depression     Seizures (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 2018     Past Surgical History:        Procedure Laterality Date    DILATION AND CURETTAGE      DILATION AND CURETTAGE OF UTERUS  2018    SUCTION    DILATION AND CURETTAGE OF UTERUS N/A 2020    DILATATION AND CURETTAGE SUCTION performed by Beata Pineda MD at [place] [address] Right 16    MARVIN [place]    TONSILLECTOMY      22    VAGINA SURGERY N/A 2/10/2019    VAGINAL REPAIR performed by [facility] at [facility]    WISDOM TOOTH EXTRACTION       Allergies:  Patient has no known allergies.   Social History:    Social History     Socioeconomic History    Marital status:      Spouse name: Not on file    Number of children: 0    Years of education: Not on file    Highest education level: Not on file   Occupational History    Occupation: RN [employer]   Tobacco Use    Smoking status: Never Smoker    Smokeless tobacco: Never Used   Vaping Use    Vaping Use: Never used   Substance and Sexual Activity    Alcohol use: No     Alcohol/week: 0.0 standard drinks    Drug use: No    Sexual activity: Yes     Partners: Male   Other Topics Concern    Not on file   Social History Narrative    Not on file     Social Determinants of Health     Financial Resource Strain: Low Risk     Difficulty of Paying Living Expenses: Not hard at all   Food Insecurity: No Food Insecurity    Worried About Running Out of Food in the Last Year: Never true    [address] in the Last Year: Never true   Transportation Needs: No Transportation Needs    Lack of Transportation (Medical): No    Lack of Transportation (Non-Medical):  No   Physical Activity:     Days of Exercise per Week: Not on file    Minutes of Exercise per Session: Not on file   Stress:     Feeling of Stress : Not on file   Social Connections:     Frequency of Communication with Friends and Family: Not on file    Frequency of Social Gatherings with Friends and Family: Not on file    Attends Taoist Services: Not on file    Active Member of [address] [place] [address] or Organizations: Not on file    Attends Club or Organization Meetings: Not on file    Marital Status: Not on file   Intimate Partner Violence:     Fear of Current or Ex-Partner: Not on file    Emotionally Abused: Not on file    Physically Abused: Not on file    Sexually Abused: Not on file   Housing Stability:     Unable to Pay for Housing in the Last Year: Not on file    Number of [place] in the Last Year: Not on file    Unstable Housing in the Last Year: Not on file     Family History:       Problem Relation Age of Onset    High Blood Pressure Mother         migraines    Thyroid Disease Mother     Asthma Brother     Thyroid Disease Maternal Grandmother         migraines     Medications Prior to Admission:  Medications Prior to Admission: famotidine (PEPCID) 20 MG tablet, Take 20 mg by mouth daily  levETIRAcetam (KEPPRA XR) 500 MG TB24 extended release tablet, TAKE 2 TABLETS BY MOUTH TWICE A DAY  enoxaparin (LOVENOX) 40 MG/0.4ML injection, Inject 0.4 mLs into the skin daily  Prenatal Vit-Fe Fumarate-FA (TRINATE) TABS, TAKE 1 TABLET BY MOUTH EVERY DAY  folic acid (FOLVITE) 1 MG tablet, TAKE 1 TABLET BY MOUTH 2 TIMES A DAY  aspirin 81 MG EC tablet, Take 81 mg by mouth daily    REVIEW OF SYSTEMS:    CONSTITUTIONAL:  negative  RESPIRATORY:  negative  CARDIOVASCULAR:  negative  GASTROINTESTINAL:  negative  ALLERGIC/IMMUNOLOGIC:  negative  NEUROLOGICAL:  negative  BEHAVIOR/PSYCH:  negative    PHYSICAL EXAM:  Vitals:    22 1143 22 1151 22 1155 22 1156   BP:       Pulse:       Resp:       Temp:       TempSrc:       SpO2: 99% 100% 100% 100%   Weight:       Height:         General appearance:  awake, alert, cooperative, no apparent distress, and appears stated age  Neurologic:  Awake, alert, oriented to name, place and time. Lungs:  No increased work of breathing, good air exchange  Abdomen:  Soft, non tender, gravid, consistent with her gestational age   Fetal heart rate:  Reassuring.   Pelvis:  Adequate pelvis  Cervix: Closed 50% medium -3  Contraction frequency:  0 minutes    Membranes:  Intact    ASSESSMENT AND PLAN:  IUP at 38 weeks 2 days  Breech  covid positive    Labor: Admit,  routine labor orders  Fetus: Reassuring  GBS: No  Other: IV hydration and antiemetics, IV antibiotic therapy, , NPO after midnight, R, B, A and possible complications discussed

## 2022-03-25 NOTE — ANESTHESIA PRE PROCEDURE
Department of Anesthesiology  Preprocedure Note       Name:  Viry Cadena   Age:  27 y.o.  :  1995                                          MRN:  [id number]         Date:  3/25/2022      Surgeon: Philip Hendrickson): Dario Fuller MD    Procedure: Procedure(s):   SECTION    Medications prior to admission:   Prior to Admission medications    Medication Sig Start Date End Date Taking?  Authorizing Provider   famotidine (PEPCID) 20 MG tablet Take 20 mg by mouth daily    Historical Provider, MD   levETIRAcetam (KEPPRA XR) 500 MG TB24 extended release tablet TAKE 2 TABLETS BY MOUTH TWICE A DAY 22   José Rom, APRN - CNS   enoxaparin (LOVENOX) 40 MG/0.4ML injection Inject 0.4 mLs into the skin daily 22   Dario Fuller MD   Prenatal Vit-Fe Fumarate-FA (TRINATE) TABS TAKE 1 TABLET BY MOUTH EVERY DAY 10/18/21   Dario Fuller MD   folic acid (FOLVITE) 1 MG tablet TAKE 1 TABLET BY MOUTH 2 TIMES A DAY 21   Saroj Alvarez MD   aspirin 81 MG EC tablet Take 81 mg by mouth daily    Historical Provider, MD       Current medications:    Current Facility-Administered Medications   Medication Dose Route Frequency Provider Last Rate Last Admin    lactated ringers infusion   IntraVENous Continuous Dario Fuller MD        lactated ringers bolus  1,000 mL IntraVENous Once Dario Fuller MD 1,000 mL/hr at 22 1030 1,000 mL at 22 1030    sodium chloride flush 0.9 % injection 10 mL  10 mL IntraVENous 2 times per day Dario Fuller MD        sodium chloride flush 0.9 % injection 10 mL  10 mL IntraVENous PRN Dario Fuller MD        0.9 % sodium chloride infusion  25 mL IntraVENous PRN Dario Fuller MD        citric acid-sodium citrate (BICITRA) solution 30 mL  30 mL Oral Once Dario Fuller MD        acetaminophen (TYLENOL) tablet 650 mg  650 mg Oral Q4H PRN Dario Fuller MD        rho(D) immune globulin (HYPERRHO S/D) injection 300 mcg  300 mcg IntraMUSCular Once Robson Aguilar MD        docusate sodium (COLACE) capsule 100 mg  100 mg Oral BID Robson Aguilar MD        lansinoh lanolin ointment   Topical Q1H PRN Robson Aguilar MD        ceFAZolin (ANCEF) 2000 mg in sterile water 20 mL IV syringe  2,000 mg IntraVENous Once Robson Aguilar MD        oxytocin (PITOCIN) 30 units in 500 mL infusion  87.3 doug-units/min IntraVENous Continuous PRN Robson Aguilar MD        And    oxytocin (PITOCIN) 10 unit bolus from the bag  10 Units IntraVENous PRN Robson Aguilar MD           Allergies:  No Known Allergies    Problem List:    Patient Active Problem List   Diagnosis Code    Acute medial meniscus tear of right knee S83.241A    History of recurrent miscarriages N96    [facility] D68.59    Methylenetetrahydrofolate reductase deficiency affecting pregnancy in third trimester ([facility] O99.283, E72.12    Encounter for supervision of normal first pregnancy in second trimester Z34.02    Seizure disorder during pregnancy second trimester, antepartum ([facility] O99.352, G40.909    Suspected shortening of cervix not found Z03.75    Seizure disorder during pregnancy, third trimester ([facility] O99.353, G40.909    Blood coagulation disorder complicating pregnancy, third trimester ([facility] O99.113, D68.9    Vaginal polyp N84.2    Condyloma acuminatum A63.0    Fever R50.9    Pharyngitis J02.9    Viral URI J06.9    Thrombophilia affecting pregnancy in first trimester, antepartum ([facility] O99.111, D68.59    S/P  P97.860       Past Medical History:        Diagnosis Date    Disease of blood and blood forming organ     mthfr     Missed      MTHFR (methylene THF reductase) deficiency and homocystinuria ([facility]     ARMANDO-1 4G/4G genotype     Postpartum depression     Seizures ([facility]     Tamulonus, last one     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 ([facility] 2018       Past Surgical History:        Procedure Laterality Date    DILATION AND CURETTAGE      DILATION AND CURETTAGE OF UTERUS  02/06/2018    SUCTION    DILATION AND CURETTAGE OF UTERUS N/A 12/11/2020    DILATATION AND CURETTAGE SUCTION performed by Scout Abrams MD at [place] [address] Right 12/8/16    ROBMiguel Lori Ebenezer    TONSILLECTOMY      22    VAGINA SURGERY N/A 2/10/2019    VAGINAL REPAIR performed [facility] Main [facility] at [facility] L[facility]D    WISDOM TOOTH EXTRACTION         Social History:    Social History     Tobacco Use    Smoking status: Never Smoker    Smokeless tobacco: Never Used   Substance Use Topics    Alcohol use: No     Alcohol/week: 0.0 standard drinks                                Counseling given: Not Answered      Vital Signs (Current):   Vitals:    03/25/22 1043 03/25/22 1048 03/25/22 1052 03/25/22 1053   BP:   135/85    Pulse:   105    Resp:       Temp:       TempSrc:       SpO2: 98% 99%  99%   Weight:       Height:                                                  BP Readings from Last 3 Encounters:   03/25/22 135/85   03/21/22 119/84   03/14/22 128/87       NPO Status: Time of last liquid consumption: 0800                        Time of last solid consumption: 1900                        Date of last liquid consumption: 03/25/22                        Date of last solid food consumption: 03/24/22    BMI:   Wt Readings from Last 3 Encounters:   03/25/22 170 lb (77.1 kg)   03/21/22 172 lb (78 kg)   03/14/22 167 lb (75.8 kg)     Body mass index is 33.2 kg/m².     CBC:   Lab Results   Component Value Date    WBC 7.6 03/25/2022    RBC 4.19 03/25/2022    HGB 13.4 03/25/2022    HCT 39.6 03/25/2022    MCV 94.5 03/25/2022    RDW 13.2 03/25/2022     03/25/2022       CMP:   Lab Results   Component Value Date     08/28/2020    K 4.0 08/28/2020     08/28/2020    CO2 28 08/28/2020    BUN 11 08/28/2020    CREATININE 0.5 08/28/2020    GFRAA >60 08/28/2020    LABGLOM >60 08/28/2020    GLUCOSE 141 01/05/2022    GLUCOSE 82 08/28/2020    PROT 7.3 08/28/2020    CALCIUM 9.6 08/28/2020    BILITOT 0.5 08/28/2020    ALKPHOS 56 08/28/2020    AST 16 08/28/2020    ALT 10 08/28/2020       POC Tests: No results for input(s): POCGLU, POCNA, POCK, POCCL, POCBUN, POCHEMO, POCHCT in the last 72 hours. Coags:   Lab Results   Component Value Date    PROTIME 11.5 03/25/2022    INR 1.1 03/25/2022    APTT 21.6 02/10/2019       HCG (If Applicable):   Lab Results   Component Value Date    PREGTESTUR pos 08/06/2021        ABGs: No results found for: PHART, PO2ART, VLL2YZR, KVC1BGH, BEART, I0PDIOJZ     Type & Screen (If Applicable):  No results found for: LABABO, LABRH    Drug/Infectious Status (If Applicable):  No results found for: HIV, HEPCAB    COVID-19 Screening (If Applicable):   Lab Results   Component Value Date    COVID19 Not-Detected 12/03/2021    COVID19 Not Detected 12/03/2021           Anesthesia Evaluation  Patient summary reviewed and Nursing notes reviewed no history of anesthetic complications:   Airway: Mallampati: II  TM distance: >3 FB   Neck ROM: full  Mouth opening: > = 3 FB Dental: normal exam         Pulmonary:normal exam  breath sounds clear to auscultation      (-) not a current smoker          Patient did not smoke on day of surgery. Cardiovascular:Negative CV ROS  Exercise tolerance: good (>4 METS),           Rhythm: regular  Rate: normal           Beta Blocker:  Not on Beta Blocker         Neuro/Psych:   (+) seizures:, psychiatric history: stable without treatmentdepression/anxiety              ROS comment: Last seizure was 2011. Currently taking Keppra. GI/Hepatic/Renal:   (+) morbid obesity          Endo/Other:    (+) blood dyscrasia: anticoagulation therapy:., .                  ROS comment: Hx of thrombophilia. Takes Lovenox 40 mg daily. Last dose was 2100 on Wednesday 03/23/2022. Abdominal:             Vascular:           Other Findings:             Anesthesia Plan      general, spinal and epidural     ASA 2     (Discussed labor options with patient and spouse. Questions answered. Paient agrees to spinal with GA as back up.)      MIPS: Postoperative opioids intended and Prophylactic antiemetics administered. Anesthetic plan and risks discussed with patient and spouse. Plan discussed with attending. CBC   Lab Results   Component Value Date    WBC 7.6 03/25/2022    RBC 4.19 03/25/2022    HGB 13.4 03/25/2022    HCT 39.6 03/25/2022    MCV 94.5 03/25/2022    RDW 13.2 03/25/2022     03/25/2022     CMP    Lab Results   Component Value Date     08/28/2020    K 4.0 08/28/2020     08/28/2020    CO2 28 08/28/2020    BUN 11 08/28/2020    CREATININE 0.5 08/28/2020    GFRAA >60 08/28/2020    LABGLOM >60 08/28/2020    GLUCOSE 141 01/05/2022    GLUCOSE 82 08/28/2020    PROT 7.3 08/28/2020    CALCIUM 9.6 08/28/2020    BILITOT 0.5 08/28/2020    ALKPHOS 56 08/28/2020    AST 16 08/28/2020    ALT 10 08/28/2020     BMP    Lab Results   Component Value Date     08/28/2020    K 4.0 08/28/2020     08/28/2020    CO2 28 08/28/2020    BUN 11 08/28/2020    CREATININE 0.5 08/28/2020    CALCIUM 9.6 08/28/2020    GFRAA >60 08/28/2020    LABGLOM >60 08/28/2020    GLUCOSE 141 01/05/2022    GLUCOSE 82 08/28/2020     POCGlucose  No results for input(s): GLUCOSE in the last 72 hours.      Coags  Lab Results   Component Value Date    PROTIME 11.5 03/25/2022    INR 1.1 03/25/2022    APTT 21.6 02/10/2019       HCG (If Applicable)   Lab Results   Component Value Date    PREGTESTUR pos 08/06/2021        ABGs   No results found for: PHART, PO2ART, CZJ0ZQP, JDV0VJN, BEART, B5XHSLNZ     Type & Screen (If Applicable)  Lab Results   Component Value Date    ABORH A POS 03/25/2022     Active Problem List with ICD10 Codes  Patient Active Problem List   Diagnosis Code    Acute medial meniscus tear of right knee S83.241A    History of recurrent miscarriages N96    Thrombophilia ([facility] D68.59    Methylenetetrahydrofolate reductase deficiency affecting pregnancy in third trimester ([facility] O99.283, E72.12    Encounter for supervision of normal first pregnancy in second trimester Z34.02    Seizure disorder during pregnancy second trimester, antepartum ([facility] O99.352, G40.909    Suspected shortening of cervix not found Z03.75    Seizure disorder during pregnancy, third trimester ([facility] O99.353, G40.909    Blood coagulation disorder complicating pregnancy, third trimester ([facility] O99.113, D68.9    Vaginal polyp N84.2    Condyloma acuminatum A63.0    Fever R50.9    Pharyngitis J02.9    Viral URI J06.9    Thrombophilia affecting pregnancy in first trimester, antepartum ([facility] O99.111, D68.59    S/P  G13.802       LYDIA Baker CRNA  2022  11:34 AM      LYDIA Baker CRNA   3/25/2022

## 2022-03-25 NOTE — PROGRESS NOTES
Recovery complete, brittany care done, so catheter emptied for 350 cc's clear yellow urine. Silver dressing dry and intact to lower abdomen. Pt transferred to room 311 via bed with mask on. Report given to BODØ.

## 2022-03-25 NOTE — PROGRESS NOTES
presents to L&D at 38 weeks 2 days for scheduled CS for breech presentation   Pt admits to + fetal movement, denies LOF & VB   Covid + asymptomatic cough & sore throat, low grae fevers past two days afebrile at this time

## 2022-03-25 NOTE — OP NOTE
PREOPERATIVE DIAGNOSES:     1. 38w2d intrauterine pregnancy. 2.  breech-buddy  3. Thrombophilia  4. Covid positive status      POSTOPERATIVE DIAGNOSES:     Same.      PROCEDURE: primary low transverse  section        SURGEON: Abel Alvarenga MD MD FACOG       ASSISTANT: Dr Cecil King MD       ESTIMATED BLOOD LOSS:  [id number]        COMPLICATIONS:  None.         ANESTHESIA:   Spinal anesthesia        FINDINGS:  Merline Sheriff Born  Sex:  Female  Fetal Position:  Breech, sacrum posterior  Apgars:  1 minute: 8; 5 minute: 9  Weight:  7 pounds, 2 ounces 3220 grams  Tubes, uterus, ovaries:  normal          DETAILS OF PROCEDURE:    The patient was taken to the operating room where Spinal anesthesia was administered and found to be adequate. Abdomen was prepped   and draped in the normal sterile fashion. Ovalle catheter had previously been   placed. Pfannenstiel skin incision made with a scalpel, carried down to the fascia with cautery. The fascia was nicked in the midline and extended laterally with   cautery. Muscles were  in the midline. Peritoneum was grasped with   hemostats, tented up, and entered sharply. Peritoneal incision was extended   superiorly and inferiorly with good visualization of bladder. Delee bladder blade was introduced. Bladder flap was created with sharp dissection. Low transverse uterine incision was made with a scalpel and extended bluntly. Membranes ruptured for Clear fluid. A viable female infant was delivered in the breech buddy presentation without diffiuclty. Baby was bulb suctioned on the abdomen. Cord was clamped and cut, and handed to waiting R.N. Cord blood was obtained. Placenta was manually extracted with 3 vessel cord. Uterus was exteriorized, cleared of all clot and debris. Uterine incision   was closed with vicryl in a running locked fashion. Good hemostasis was   noted. Uterus, tubes, and ovaries appeared normal. Uterus was returned to   the pelvis.  The pelvis was irrigated, cleared of all clot and debris. Fascia was closed with 0 stratafix  in a running fashion. Subcutaneous tissue was irrigated, made hemostatic. Skin was closed with absorbable subcutaneous sutures. Baby and mom to recovery room in stable condition. Placenta to pathology: Yes.     Amira Zimmerman MD MD [facility]

## 2022-03-25 NOTE — LACTATION NOTE
Experienced mom reports baby latched well in RR. Encouraged skin to skin and frequent attempts at breast to stimulate milk production. Instructed on normal infant behavior in the first 12-24 hours and importance of stimulating the baby frequently to eat during this time. Reviewed hand expression, and encouraged to hand express drops of colostrum when baby is sleepy. Instructed that baby may also feed 8-12 times a day- cluster feeding at times- as her milk supply is being established. Instructed on benefits of skin to skin and avoidance of pacifier / artificial nipple use until breastfeeding is well established. Educated on making sure infant has an open airway while breastfeeding and skin to skin. Instructed on hunger cues and waking techniques to try. Reviewed signs of adequate I & O; allow baby to feed ad brian and not to limit time at breast.  Encouraged to call with any concerns. Mom has a breast pump for home use. Lactation office # and [employer] janeen information supplied for educational needs.

## 2022-03-25 NOTE — ANESTHESIA PROCEDURE NOTES
Spinal Block    Patient location during procedure: OR  Start time: 3/25/2022 12:10 PM  End time: 3/25/2022 12:14 PM  Reason for block: primary anesthetic and at surgeon's request  Staffing  Performed: resident/CRNA   Anesthesiologist: Sameera Wray MD  Resident/CRNA: LYDIA Rick - CHON  Other anesthesia staff: Barbara Cintron RN  Preanesthetic Checklist  Completed: patient identified, IV checked, site marked, risks and benefits discussed, surgical consent, monitors and equipment checked, pre-op evaluation, timeout performed, anesthesia consent given, oxygen available and patient being monitored  Spinal Block  Patient position: sitting  Prep: ChloraPrep  Patient monitoring: cardiac monitor, continuous pulse ox, continuous capnometry and frequent blood pressure checks  Approach: midline  Location: L3/L4  Provider prep: mask, sterile gloves and sterile gown  Local infiltration: lidocaine  Dose: 0.2  Agent: bupivacaine  Adjuvant: duramorph  Dose: 1.6  Dose: 1.6  Needle  Needle type: Pencan   Needle gauge: 25 G  Needle length: 3.5 in  Assessment  Sensory level: T6  Swirl obtained: Yes  CSF: clear  Attempts: 1  Hemodynamics: stable

## 2022-03-25 NOTE — PROGRESS NOTES
Patient admitted to room 311. Oriented to room and call system. Admission folder info reviewed with patient. Patient and fob verbalized an understanding. [id number] [address]lez for Hep B, patient had Tdap in office.

## 2022-03-26 LAB
HCT VFR BLD CALC: 35.9 % (ref 34–48)
HEMOGLOBIN: 12.3 G/DL (ref 11.5–15.5)

## 2022-03-26 PROCEDURE — 6360000002 HC RX W HCPCS: Performed by: OBSTETRICS & GYNECOLOGY

## 2022-03-26 PROCEDURE — 85014 HEMATOCRIT: CPT

## 2022-03-26 PROCEDURE — 85018 HEMOGLOBIN: CPT

## 2022-03-26 PROCEDURE — 1220000000 HC SEMI PRIVATE OB R&B

## 2022-03-26 PROCEDURE — 36415 COLL VENOUS BLD VENIPUNCTURE: CPT

## 2022-03-26 PROCEDURE — 6370000000 HC RX 637 (ALT 250 FOR IP): Performed by: OBSTETRICS & GYNECOLOGY

## 2022-03-26 PROCEDURE — 6370000000 HC RX 637 (ALT 250 FOR IP): Performed by: ANESTHESIOLOGY

## 2022-03-26 PROCEDURE — 2580000003 HC RX 258: Performed by: OBSTETRICS & GYNECOLOGY

## 2022-03-26 RX ORDER — ASPIRIN 81 MG/1
81 TABLET, CHEWABLE ORAL DAILY
Status: DISCONTINUED | OUTPATIENT
Start: 2022-03-26 | End: 2022-03-27 | Stop reason: HOSPADM

## 2022-03-26 RX ORDER — GUAIFENESIN 100 MG/5ML
200 SOLUTION ORAL EVERY 4 HOURS PRN
Status: DISCONTINUED | OUTPATIENT
Start: 2022-03-26 | End: 2022-03-27 | Stop reason: HOSPADM

## 2022-03-26 RX ADMIN — HYDROCODONE BITARTRATE AND ACETAMINOPHEN 1 TABLET: 5; 325 TABLET ORAL at 16:41

## 2022-03-26 RX ADMIN — HYDROCODONE BITARTRATE AND ACETAMINOPHEN 1 TABLET: 5; 325 TABLET ORAL at 20:50

## 2022-03-26 RX ADMIN — KETOROLAC TROMETHAMINE 30 MG: 30 INJECTION, SOLUTION INTRAMUSCULAR at 02:23

## 2022-03-26 RX ADMIN — GUAIFENESIN 200 MG: 200 SOLUTION ORAL at 12:26

## 2022-03-26 RX ADMIN — SIMETHICONE 80 MG: 80 TABLET, CHEWABLE ORAL at 15:38

## 2022-03-26 RX ADMIN — ASPIRIN 81 MG: 81 TABLET, CHEWABLE ORAL at 12:26

## 2022-03-26 RX ADMIN — DOCUSATE SODIUM 100 MG: 100 CAPSULE, LIQUID FILLED ORAL at 20:50

## 2022-03-26 RX ADMIN — KETOROLAC TROMETHAMINE 30 MG: 30 INJECTION, SOLUTION INTRAMUSCULAR at 09:28

## 2022-03-26 RX ADMIN — IBUPROFEN 800 MG: 800 TABLET, FILM COATED ORAL at 15:38

## 2022-03-26 RX ADMIN — ENOXAPARIN SODIUM 40 MG: 100 INJECTION SUBCUTANEOUS at 20:50

## 2022-03-26 RX ADMIN — HYDROCODONE BITARTRATE AND ACETAMINOPHEN 1 TABLET: 5; 325 TABLET ORAL at 12:26

## 2022-03-26 RX ADMIN — LEVETIRACETAM 1000 MG: 500 TABLET, EXTENDED RELEASE ORAL at 09:29

## 2022-03-26 RX ADMIN — SIMETHICONE 80 MG: 80 TABLET, CHEWABLE ORAL at 20:50

## 2022-03-26 RX ADMIN — SODIUM CHLORIDE, PRESERVATIVE FREE 10 ML: 5 INJECTION INTRAVENOUS at 09:29

## 2022-03-26 RX ADMIN — DOCUSATE SODIUM 100 MG: 100 CAPSULE, LIQUID FILLED ORAL at 09:28

## 2022-03-26 RX ADMIN — LEVETIRACETAM 1000 MG: 500 TABLET, EXTENDED RELEASE ORAL at 20:49

## 2022-03-26 RX ADMIN — Medication: at 15:38

## 2022-03-26 RX ADMIN — ACETAMINOPHEN 650 MG: 325 TABLET ORAL at 06:37

## 2022-03-26 RX ADMIN — GUAIFENESIN 200 MG: 200 SOLUTION ORAL at 16:41

## 2022-03-26 RX ADMIN — METFORMIN HYDROCHLORIDE 1 TABLET: 500 TABLET, EXTENDED RELEASE ORAL at 09:32

## 2022-03-26 ASSESSMENT — PAIN SCALES - GENERAL
PAINLEVEL_OUTOF10: 0
PAINLEVEL_OUTOF10: 5
PAINLEVEL_OUTOF10: 4
PAINLEVEL_OUTOF10: 5
PAINLEVEL_OUTOF10: 4
PAINLEVEL_OUTOF10: 5
PAINLEVEL_OUTOF10: 1
PAINLEVEL_OUTOF10: 6
PAINLEVEL_OUTOF10: 5

## 2022-03-26 ASSESSMENT — PAIN DESCRIPTION - DESCRIPTORS
DESCRIPTORS: BURNING;DISCOMFORT;SORE
DESCRIPTORS: BURNING;DISCOMFORT;SORE

## 2022-03-26 ASSESSMENT — PAIN DESCRIPTION - ONSET
ONSET: GRADUAL
ONSET: GRADUAL

## 2022-03-26 ASSESSMENT — PAIN DESCRIPTION - FREQUENCY
FREQUENCY: INTERMITTENT

## 2022-03-26 ASSESSMENT — PAIN - FUNCTIONAL ASSESSMENT
PAIN_FUNCTIONAL_ASSESSMENT: ACTIVITIES ARE NOT PREVENTED

## 2022-03-26 ASSESSMENT — PAIN DESCRIPTION - LOCATION
LOCATION: ABDOMEN
LOCATION: ABDOMEN
LOCATION: ABDOMEN;INCISION
LOCATION: ABDOMEN;INCISION

## 2022-03-26 ASSESSMENT — PAIN DESCRIPTION - PROGRESSION
CLINICAL_PROGRESSION: GRADUALLY IMPROVING
CLINICAL_PROGRESSION: GRADUALLY WORSENING
CLINICAL_PROGRESSION: GRADUALLY WORSENING

## 2022-03-26 ASSESSMENT — PAIN DESCRIPTION - PAIN TYPE
TYPE: ACUTE PAIN;SURGICAL PAIN
TYPE: ACUTE PAIN
TYPE: ACUTE PAIN;SURGICAL PAIN

## 2022-03-26 ASSESSMENT — PAIN DESCRIPTION - ORIENTATION
ORIENTATION: LOWER
ORIENTATION: LOWER

## 2022-03-26 NOTE — FLOWSHEET NOTE
Nurse assisted patient up out of bed to the bathroom at this time. Patient tolerated well and ambulated steadily. Denies any dizziness. Ovalle discontinued, 900 cc of clear yellow urine drained. SCD's removed and will monitor pulse ox intermittently at this time. Educated patient to call for nurse if she needs any further assistance with ambulation and she verbalized understanding.

## 2022-03-26 NOTE — PROGRESS NOTES
Called and left a message requesting a call back from Dr. Guy Tobar regarding patient. Await call back.

## 2022-03-26 NOTE — PROGRESS NOTES
Progress Note    SUBJECTIVE:   Pt comfortable; +void; +flatus; +ambulation; +cough    OBJECTIVE:    VITALS:  /67   Pulse 97   Temp 98.7 °F (37.1 °C) (Oral)   Resp 18   Ht 5' (1.524 m)   Wt 170 lb (77.1 kg)   LMP 2021   SpO2 96%   Breastfeeding Unknown   BMI 33.20 kg/m²   Physical Exam  ABDOMEN:  normal bowel sounds, non-distended, non-tender and incision d/i  Ext nt    DATA:  Hemoglobin/Hematocrit:    Lab Results   Component Value Date    HGB 12.3 2022    HCT 35.9 2022       ASSESSMENT AND PLAN:  S/p ltcs  Principal Problem:    S/P   Resolved Problems:    * No resolved hospital problems.  *    POD#1  Plan discharge home in 1-2 days    Electronically signed by Cipriano Martínez DO on 3/26/2022 at 2:44 PM

## 2022-03-26 NOTE — PLAN OF CARE
Problem: Venous Thromboembolism - Risk of:  Goal: Will show no signs or symptoms of venous thromboembolism  Description: Will show no signs or symptoms of venous thromboembolism  Outcome: Ongoing     Problem: Pain:  Goal: Pain level will decrease  Description: Pain level will decrease  Outcome: Ongoing  Goal: Control of acute pain  Description: Control of acute pain  Outcome: Ongoing  Goal: Control of chronic pain  Description: Control of chronic pain  Outcome: Ongoing     Problem: Gas Exchange - Impaired  Goal: Absence of hypoxia  Outcome: Ongoing  Goal: Promote optimal lung function  Outcome: Ongoing

## 2022-03-26 NOTE — ANESTHESIA POSTPROCEDURE EVALUATION
Department of Anesthesiology  Postprocedure Note    Patient: Selin Cleveland  MRN: [id number]  YOB: 1995  Date of evaluation: 3/26/2022  Time:  7:58 AM     Procedure Summary     Date: 22 [facility] / [facility]    Anesthesia Start: 9339 Anesthesia Stop:     Procedure:  SECTION (N/A Uterus) Diagnosis: (csection)    Surgeons: Milka White MD Responsible Provider: Dasha Bell MD    Anesthesia Type: general, spinal, epidural ASA Status: 2          Anesthesia Type: general, spinal, epidural    Wilbur Phase I: Wilbur Score: 9    Wilbur Phase II:      Last vitals: Reviewed and per EMR flowsheets.        Anesthesia Post Evaluation    Patient location during evaluation: bedside  Patient participation: complete - patient participated  Level of consciousness: awake and alert  Pain score: 0  Airway patency: patent  Nausea & Vomiting: no nausea and no vomiting  Complications: no  Cardiovascular status: blood pressure returned to baseline  Respiratory status: acceptable  Hydration status: euvolemic

## 2022-03-26 NOTE — LACTATION NOTE
Met with mom and FOB. States breastfeeding is going very well. Good latch, position, and technique. Denies soreness or discomfort. Has history of successful breastfeeding previous child for 1 year, plans to breastfeed this infant for at least 1 year also. 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 No questions regarding pumping. All other questions and concerns addressed, no further needs.

## 2022-03-26 NOTE — PROGRESS NOTES
Received call back from Dr. Nuzhat Nina regarding patient's request for something for cough and her home med is ASA QD. Okay to order home med and new order for cough medcation. Will continue to monitor.

## 2022-03-27 VITALS
HEIGHT: 60 IN | OXYGEN SATURATION: 97 % | RESPIRATION RATE: 14 BRPM | WEIGHT: 170 LBS | BODY MASS INDEX: 33.38 KG/M2 | DIASTOLIC BLOOD PRESSURE: 73 MMHG | HEART RATE: 95 BPM | TEMPERATURE: 97.9 F | SYSTOLIC BLOOD PRESSURE: 127 MMHG

## 2022-03-27 PROBLEM — G89.18 ACUTE POSTOPERATIVE PAIN: Status: ACTIVE | Noted: 2022-03-27

## 2022-03-27 PROCEDURE — 6370000000 HC RX 637 (ALT 250 FOR IP): Performed by: OBSTETRICS & GYNECOLOGY

## 2022-03-27 RX ADMIN — LEVETIRACETAM 1000 MG: 500 TABLET, EXTENDED RELEASE ORAL at 08:30

## 2022-03-27 RX ADMIN — HYDROCODONE BITARTRATE AND ACETAMINOPHEN 1 TABLET: 5; 325 TABLET ORAL at 02:45

## 2022-03-27 RX ADMIN — SIMETHICONE 80 MG: 80 TABLET, CHEWABLE ORAL at 08:30

## 2022-03-27 RX ADMIN — IBUPROFEN 800 MG: 800 TABLET, FILM COATED ORAL at 00:17

## 2022-03-27 RX ADMIN — DOCUSATE SODIUM 100 MG: 100 CAPSULE, LIQUID FILLED ORAL at 08:30

## 2022-03-27 RX ADMIN — HYDROCODONE BITARTRATE AND ACETAMINOPHEN 1 TABLET: 5; 325 TABLET ORAL at 06:53

## 2022-03-27 RX ADMIN — METFORMIN HYDROCHLORIDE 1 TABLET: 500 TABLET, EXTENDED RELEASE ORAL at 08:30

## 2022-03-27 RX ADMIN — GUAIFENESIN 200 MG: 200 SOLUTION ORAL at 08:30

## 2022-03-27 RX ADMIN — ASPIRIN 81 MG: 81 TABLET, CHEWABLE ORAL at 08:30

## 2022-03-27 ASSESSMENT — PAIN SCALES - GENERAL
PAINLEVEL_OUTOF10: 4
PAINLEVEL_OUTOF10: 4
PAINLEVEL_OUTOF10: 5

## 2022-03-27 NOTE — PLAN OF CARE
Problem: Venous Thromboembolism - Risk of:  Goal: Will show no signs or symptoms of venous thromboembolism  Outcome: Met This Shift     Problem: Pain:  Goal: Pain level will decrease  Outcome: Met This Shift  Goal: Control of acute pain  Outcome: Met This Shift  Goal: Control of chronic pain  Outcome: Met This Shift     Problem: Airway Clearance - Ineffective  Goal: Achieve or maintain patent airway  Outcome: Met This Shift     Problem: Gas Exchange - Impaired  Goal: Absence of hypoxia  Outcome: Met This Shift  Goal: Promote optimal lung function  Outcome: Met This Shift     Problem: Breathing Pattern - Ineffective  Goal: Ability to achieve and maintain a regular respiratory rate  Outcome: Met This Shift     Problem:  Body Temperature -  Risk of, Imbalanced  Goal: Ability to maintain a body temperature within defined limits  Outcome: Met This Shift  Goal: Will regain or maintain usual level of consciousness  Outcome: Met This Shift  Goal: Complications related to the disease process, condition or treatment will be avoided or minimized  Outcome: Met This Shift     Problem: Isolation Precautions - Risk of Spread of Infection  Goal: Prevent transmission of infection  Outcome: Met This Shift     Problem: Nutrition Deficits  Goal: Optimize nutritional status  Outcome: Met This Shift     Problem: Risk for Fluid Volume Deficit  Goal: Maintain normal heart rhythm  Outcome: Met This Shift  Goal: Maintain absence of muscle cramping  Outcome: Met This Shift  Goal: Maintain normal serum potassium, sodium, calcium, phosphorus, and pH  Outcome: Met This Shift     Problem: Loneliness or Risk for Loneliness  Goal: Demonstrate positive use of time alone when socialization is not possible  Outcome: Met This Shift     Problem: Fatigue  Goal: Verbalize increase energy and improved vitality  Outcome: Met This Shift     Problem: Patient Education: Go to Patient Education Activity  Goal: Patient/Family Education  Outcome: Met This Shift     Problem: Breastfeeding - Ineffective:  Goal: Infant able to latch onto breast  Outcome: Met This Shift  Goal: Intact skin on mother's nipple  Outcome: Met This Shift  Goal: Uninterrupted skin-to-skin contact during initial breast-feeding if medically possible  Outcome: Met This Shift  Goal: Urine and stool output as expected for age  Outcome: Met This Shift  Goal: Weight loss within specified parameters for age  Outcome: Met This Shift     Problem: Discharge Planning:  Goal: Discharged to appropriate level of care  Outcome: Met This Shift     Problem: Fluid Volume - Imbalance:  Goal: Absence of postpartum hemorrhage signs and symptoms  Outcome: Met This Shift  Goal: Absence of imbalanced fluid volume signs and symptoms  Outcome: Met This Shift     Problem: Infection - Surgical Site:  Goal: Will show no infection signs and symptoms  Outcome: Met This Shift     Problem: Mood - Altered:  Goal: Mood stable  Outcome: Met This Shift     Problem: Nausea/Vomiting:  Goal: Absence of nausea/vomiting  Outcome: Met This Shift     Problem: Pain - Acute:  Goal: Pain level will decrease  Outcome: Met This Shift     Problem: Urinary Retention:  Goal: Urinary elimination within specified parameters  Outcome: Met This Shift     Problem: Venous Thromboembolism:  Goal: Will show no signs or symptoms of venous thromboembolism  Outcome: Met This Shift  Goal: Absence of signs or symptoms of impaired coagulation  Outcome: Met This Shift

## 2022-03-27 NOTE — LACTATION NOTE
Assessed latch and discussed cluster feeding benefits. Encouraged frequent feeds to establish milk supply. Reviewed benefits and safety of skin to skin. Inst on adequate I/O and importance of keeping track of diapers at home. Instructed on signs of dehydration such as infant refusing to feed, decreased wet diapers and infant becoming listless and notify provider if these occur. Reviewed with mom the importance of notifying the physician if baby looks more jaundiced. Lactation office # given if follow-up needed, as well as other helpful resources. Encouraged to call with any concerns. Support and encouragement given.

## 2022-03-27 NOTE — PROGRESS NOTES
Subjective:     Postpartum Day 2:  Delivery    The patient feels tired. Pain is moderately controlled with current medications. Baby is feeding via breast. Urinary output is adequate. The patient is ambulating well. The patient is tolerating a normal diet. Flatus has been passed. Objective:        Vitals:    22 0026   BP: 126/82   Pulse: 101   Resp: 18   Temp: 97.6 °F (36.4 °C)   SpO2:        No intake or output data in the 24 hours ending 22 0738  Lab Results   Component Value Date    WBC 7.6 2022    HGB 12.3 2022    HCT 35.9 2022    MCV 94.5 2022     2022       General:    alert, appears stated age and cooperative   Lungs:    Lochia:  appropriate   Uterine    firm   Incision:  dressing intact   DVT Evaluation:  No evidence of DVT seen on physical exam.     Assessment:     Status post  section. Doing well postoperatively. Postoperative course complicated by Covid     Plan:     Discharge home with standard precautions and return to clinic in 1 week.

## 2022-03-29 NOTE — DISCHARGE SUMMARY
Obstetrical Discharge Form        Patient ID:  Lupe Mello  [id number]  32 y.o.  1995    Admit date: 3/25/2022    Discharge date: 3/27/2022     Admitting Physician: Amira Zimmerman MD    Discharge Diagnoses: S/P  [D70.242]    Final Diagnosis:   Principal Problem:    S/P   Active Problems:    Acute postoperative pain  Resolved Problems:    * No resolved hospital problems. *      Discharged Condition: good      Gestational Age:38w2d    Antepartum complications: breech/thrombophilia/covid positive    Date of Delivery: 3/25/22    Type of Delivery:  for breech    Delivered By: Dwayne Johnson MD         Baby:     Information for the patient's :  Mikala Sweeney [[id number]]          Anesthesia: Spinal    Intrapartum complications: None    Feeding method: breast    Hospital Course: Uneventful. Patient recovered well without any issues     Discharged Condition: stable to home    Discharge Medication:      Medication List      START taking these medications    * HYDROcodone-acetaminophen 5-325 MG per tablet  Commonly known as: Norco  Take 1 tablet by mouth every 6 hours as needed for Pain for up to 7 days. Intended supply: 3 days. Take lowest dose possible to manage pain     * HYDROcodone-acetaminophen 5-325 MG per tablet  Commonly known as: Norco  Take 1 tablet by mouth every 6 hours as needed for Pain for up to 7 days. Intended supply: 3 days. Take lowest dose possible to manage pain     ibuprofen 800 MG tablet  Commonly known as: ADVIL;MOTRIN  Take 1 tablet by mouth every 8 hours as needed for Pain         * This list has 2 medication(s) that are the same as other medications prescribed for you. Read the directions carefully, and ask your doctor or other care provider to review them with you.             CONTINUE taking these medications    aspirin 81 MG EC tablet     enoxaparin 40 MG/0.4ML injection  Commonly known as: LOVENOX  Inject 0.4 mLs into the skin daily     famotidine 20 MG tablet  Commonly known as: PEPCID     folic acid 1 MG tablet  Commonly known as: FOLVITE  TAKE 1 TABLET BY MOUTH 2 TIMES A DAY     levETIRAcetam 500 MG Tb24 extended release tablet  Commonly known as: KEPPRA XR  TAKE 2 TABLETS BY MOUTH TWICE A DAY     Trinate Tabs  TAKE 1 TABLET BY MOUTH EVERY DAY           Where to Get Your Medications      These medications were sent to [address] [place], [place] [id number]  [address] [facility], [facility]    Phone: [phone]   · ibuprofen 800 MG tablet     You can get these medications from any pharmacy    Bring a paper prescription for each of these medications  · HYDROcodone-acetaminophen 5-325 MG per tablet  · HYDROcodone-acetaminophen 5-325 MG per tablet          Postpartum complications: none    Note that over 30 minutes was spent in preparing discharge papers, discussing discharge with patient, medication review, etc.    Discharge Date: 3/27/2022    Plan:   Follow up in 1 week(s)

## 2022-06-15 ENCOUNTER — OFFICE VISIT (OUTPATIENT)
Dept: NEUROLOGY | Age: 27
End: 2022-06-15
Payer: COMMERCIAL

## 2022-06-15 VITALS
HEART RATE: 76 BPM | OXYGEN SATURATION: 97 % | HEIGHT: 60 IN | WEIGHT: 152 LBS | DIASTOLIC BLOOD PRESSURE: 80 MMHG | SYSTOLIC BLOOD PRESSURE: 119 MMHG | BODY MASS INDEX: 29.84 KG/M2 | TEMPERATURE: 98 F

## 2022-06-15 DIAGNOSIS — G40.209 PARTIAL SYMPTOMATIC EPILEPSY WITH COMPLEX PARTIAL SEIZURES, NOT INTRACTABLE, WITHOUT STATUS EPILEPTICUS (HCC): Primary | ICD-10-CM

## 2022-06-15 PROCEDURE — 99214 OFFICE O/P EST MOD 30 MIN: CPT | Performed by: CLINICAL NURSE SPECIALIST

## 2022-06-15 RX ORDER — LEVETIRACETAM 500 MG/1
TABLET, EXTENDED RELEASE ORAL
Qty: 360 TABLET | Refills: 3 | Status: SHIPPED | OUTPATIENT
Start: 2022-06-15

## 2022-06-15 NOTE — PROGRESS NOTES
Martínez Riojas is a 27 y.o. right handed woman    Patient reports her neurological difficulties began when she was 13years of age. It was Christmastime and her mother was wrapping presents when suddenly, Prem Boss let out a loud sound. Her mother when racing into her room to find out what happened and found her displaying GTC movements. She was carried into the car and driven to the hospital. En route to the hospital, she stopped shaking and gradually awakened. She was seen by numerous clinicians and neurologist at that time and underwent copious amounts of testing. 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 She was started on antiepileptic medications at that time and after trialing numerous AEDs -- including generic Keppra and name brand Keppra -- she was started on Keppra XR 1000mg twice daily. She has been on Keppra XR 1000mg BID since she was 14 --by a neurologist in [place]   She has been seizure free since therefore adjustments have never been made    We had her follow with Dr Robert García upon learning she was pregnant in the fall 2018   She suffered 2 miscarriages r/t thrombophilia and has remained on ASA and Lovenox    She delivered well with little issues   Now on maternity leave for her second child -so girl    No new neuro complaints    Typically obtains the same generic  of Keppra XR -thankfully, [facility] has complied with this request    No chest pain or palpitations  No SOB  No vertigo, lightheadedness or loss of consciousness  No falls, tripping or stumbling  No incontinence of bowels or bladder  No itching or bruising appreciated  No numbness, tingling or focal arm/leg weakness    ROS otherwise negative     Prior to Visit Medications    Medication Sig Taking?  Authorizing Provider   levETIRAcetam (KEPPRA XR) 500 MG TB24 extended release tablet TAKE 2 TABLETS BY MOUTH TWICE A DAY Yes LYDIA Edmonds - CNS norethindrone (ORTHO MICRONOR) 0.35 MG tablet Take 1 tablet by mouth daily Yes Vani Wolfe MD   Prenatal Vit-Fe Fumarate-FA (TRINATE) TABS TAKE 1 TABLET BY MOUTH EVERY DAY Yes Vani Wolfe MD   folic acid (FOLVITE) 1 MG tablet TAKE 1 TABLET BY MOUTH 2 TIMES A DAY Yes Korey Alan MD   aspirin 81 MG EC tablet Take 81 mg by mouth daily Yes Historical Provider, MD   ibuprofen (ADVIL;MOTRIN) 800 MG tablet Take 1 tablet by mouth every 8 hours as needed for Pain  Patient not taking: Reported on 6/15/2022  Vani Wolfe MD   famotidine (PEPCID) 20 MG tablet Take 20 mg by mouth daily  Patient not taking: Reported on 6/15/2022  Historical Provider, MD   enoxaparin (LOVENOX) 40 MG/0.4ML injection Inject 0.4 mLs into the skin daily  Patient not taking: Reported on 6/15/2022  Vani Wolfe MD     Allergies as of 06/15/2022    (No Known Allergies)       Objective:     /80   Pulse 76   Temp 98 °F (36.7 °C)   Ht 5' (1.524 m)   Wt 152 lb (68.9 kg)   SpO2 97%   BMI 29.69 kg/m²      General appearance: alert, appears stated age and cooperative  Head: Normocephalic, without obvious abnormality, atraumatic  Extremities: normal without edema no clubbing or cyanosis   Pulses: 2+ and symmetric  Skin: no rashes or lesions     Mental Status: Alert, oriented, thought content appropriate    Speech: clear  Language: appropriate     Cranial Nerves:  I: smell    II: visual acuity     II: visual fields Full    II: pupils YUKO   III,VII: ptosis None   III,IV,VI: extraocular muscles  EOMI without nystagmus    V: mastication Normal   V: facial light touch sensation  Normal   V,VII: corneal reflex  Present   VII: facial muscle function - upper     VII: facial muscle function - lower Normal   VIII: hearing Normal   IX: soft palate elevation  Normal   IX,X: gag reflex    XI: trapezius strength  5/5   XI: sternocleidomastoid strength 5/5   XI: neck extension strength  5/5   XII: tongue strength  Normal     Motor:  5/5 throughout  Normal bulk and tone     Sensory:  LT normal  Vibration normal     Coordination:   FN, FFM and ANITA normal    Gait:  Normal     DTR:   Right Brachioradialis reflex 1+  Left Brachioradialis reflex 1+  Right Biceps reflex 2+  Left Biceps reflex 2+  Right Quadriceps reflex 2+  Left Quadriceps reflex 2+  Right Achilles reflex 1+  Left Achilles reflex 1+    No Ambrosio's     Laboratory/Radiology:     CBC with Differential:    Lab Results   Component Value Date    WBC 7.6 03/25/2022    RBC 4.19 03/25/2022    HGB 12.3 03/26/2022    HCT 35.9 03/26/2022     03/25/2022    MCV 94.5 03/25/2022    MCH 32.0 03/25/2022    MCHC 33.8 03/25/2022    RDW 13.2 03/25/2022    NRBC 0.0 02/10/2019    LYMPHOPCT 26.8 08/27/2021    MONOPCT 8.3 08/27/2021    EOSPCT 3.1 08/27/2021    BASOPCT 0.6 08/27/2021    MONOSABS 589 08/27/2021    LYMPHSABS 1,903 08/27/2021    EOSABS 220 08/27/2021    BASOSABS 43 08/27/2021     CMP:    Lab Results   Component Value Date     08/28/2020    K 4.0 08/28/2020     08/28/2020    CO2 28 08/28/2020    BUN 11 08/28/2020    CREATININE 0.5 08/28/2020    GFRAA >60 08/28/2020    LABGLOM >60 08/28/2020    GLUCOSE 141 01/05/2022    GLUCOSE 82 08/28/2020    PROT 7.3 08/28/2020    LABALBU 4.5 08/28/2020    CALCIUM 9.6 08/28/2020    BILITOT 0.5 08/28/2020    ALKPHOS 56 08/28/2020    AST 16 08/28/2020    ALT 10 08/28/2020     Labs personally reviewed today     EEG per previous neurology dictation showed a left frontal sharp wave discharge consistent with a primary generalized seizure disorder     Assessment:     Primary epilepsy   Suffering with CPS with secondary generalization per history (primary epilepsy per EEG)    diagnosed many years ago   Now seizure free for many years maintained on Keppra XR 1000mg twice daily     Migraine headaches   Rare -- resolve with Maxalt MLT    Plan:     Continue Keppra XR at current dose    RTO 10-12 months     Adriano Barajas APRN - CNS  10:21 AM  6/15/2022

## 2023-03-30 ENCOUNTER — OFFICE VISIT (OUTPATIENT)
Dept: FAMILY MEDICINE CLINIC | Age: 28
End: 2023-03-30

## 2023-03-30 VITALS
BODY MASS INDEX: 25.09 KG/M2 | SYSTOLIC BLOOD PRESSURE: 116 MMHG | HEIGHT: 60 IN | WEIGHT: 127.8 LBS | TEMPERATURE: 98.1 F | HEART RATE: 75 BPM | OXYGEN SATURATION: 99 % | DIASTOLIC BLOOD PRESSURE: 68 MMHG

## 2023-03-30 DIAGNOSIS — J30.9 ALLERGIC RHINITIS, UNSPECIFIED SEASONALITY, UNSPECIFIED TRIGGER: ICD-10-CM

## 2023-03-30 DIAGNOSIS — E72.12 METHYLENETETRAHYDROFOLATE REDUCTASE DEFICIENCY AFFECTING PREGNANCY IN THIRD TRIMESTER (HCC): ICD-10-CM

## 2023-03-30 DIAGNOSIS — L72.3 SEBACEOUS CYST: ICD-10-CM

## 2023-03-30 DIAGNOSIS — Z15.89 HETEROZYGOUS MTHFR MUTATION A1298C: ICD-10-CM

## 2023-03-30 DIAGNOSIS — Z15.89 HETEROZYGOUS MTHFR MUTATION C677T: ICD-10-CM

## 2023-03-30 DIAGNOSIS — O99.283 METHYLENETETRAHYDROFOLATE REDUCTASE DEFICIENCY AFFECTING PREGNANCY IN THIRD TRIMESTER (HCC): ICD-10-CM

## 2023-03-30 DIAGNOSIS — Z00.00 ENCOUNTER FOR PREVENTIVE CARE: Primary | ICD-10-CM

## 2023-03-30 DIAGNOSIS — G40.209 PARTIAL SYMPTOMATIC EPILEPSY WITH COMPLEX PARTIAL SEIZURES, NOT INTRACTABLE, WITHOUT STATUS EPILEPTICUS (HCC): ICD-10-CM

## 2023-03-30 DIAGNOSIS — E55.9 VITAMIN D DEFICIENCY, UNSPECIFIED: ICD-10-CM

## 2023-03-30 PROBLEM — J02.9 PHARYNGITIS: Status: RESOLVED | Noted: 2021-07-28 | Resolved: 2023-03-30

## 2023-03-30 PROBLEM — R50.9 FEVER: Status: RESOLVED | Noted: 2021-07-28 | Resolved: 2023-03-30

## 2023-03-30 PROBLEM — Z34.02 ENCOUNTER FOR SUPERVISION OF NORMAL FIRST PREGNANCY IN SECOND TRIMESTER: Status: RESOLVED | Noted: 2018-10-22 | Resolved: 2023-03-30

## 2023-03-30 PROBLEM — J06.9 VIRAL URI: Status: RESOLVED | Noted: 2021-07-28 | Resolved: 2023-03-30

## 2023-03-30 PROBLEM — G89.18 ACUTE POSTOPERATIVE PAIN: Status: RESOLVED | Noted: 2022-03-27 | Resolved: 2023-03-30

## 2023-03-30 RX ORDER — AZELASTINE 1 MG/ML
1 SPRAY, METERED NASAL 2 TIMES DAILY
Qty: 60 ML | Refills: 3 | Status: SHIPPED | OUTPATIENT
Start: 2023-03-30

## 2023-03-30 RX ORDER — LORATADINE AND PSEUDOEPHEDRINE SULFATE 10; 240 MG/1; MG/1
1 TABLET, EXTENDED RELEASE ORAL DAILY
Qty: 90 TABLET | Refills: 0 | Status: SHIPPED | OUTPATIENT
Start: 2023-03-30

## 2023-03-30 RX ORDER — LEVETIRACETAM 500 MG/1
TABLET, EXTENDED RELEASE ORAL
Qty: 360 TABLET | Refills: 3
Start: 2023-03-30

## 2023-03-30 SDOH — ECONOMIC STABILITY: HOUSING INSECURITY
IN THE LAST 12 MONTHS, WAS THERE A TIME WHEN YOU DID NOT HAVE A STEADY PLACE TO SLEEP OR SLEPT IN A SHELTER (INCLUDING NOW)?: NO

## 2023-03-30 SDOH — ECONOMIC STABILITY: FOOD INSECURITY: WITHIN THE PAST 12 MONTHS, YOU WORRIED THAT YOUR FOOD WOULD RUN OUT BEFORE YOU GOT MONEY TO BUY MORE.: NEVER TRUE

## 2023-03-30 SDOH — ECONOMIC STABILITY: FOOD INSECURITY: WITHIN THE PAST 12 MONTHS, THE FOOD YOU BOUGHT JUST DIDN'T LAST AND YOU DIDN'T HAVE MONEY TO GET MORE.: NEVER TRUE

## 2023-03-30 SDOH — ECONOMIC STABILITY: INCOME INSECURITY: HOW HARD IS IT FOR YOU TO PAY FOR THE VERY BASICS LIKE FOOD, HOUSING, MEDICAL CARE, AND HEATING?: NOT HARD AT ALL

## 2023-03-30 ASSESSMENT — PATIENT HEALTH QUESTIONNAIRE - PHQ9
SUM OF ALL RESPONSES TO PHQ9 QUESTIONS 1 & 2: 0
1. LITTLE INTEREST OR PLEASURE IN DOING THINGS: 0
SUM OF ALL RESPONSES TO PHQ QUESTIONS 1-9: 0
2. FEELING DOWN, DEPRESSED OR HOPELESS: 0
SUM OF ALL RESPONSES TO PHQ QUESTIONS 1-9: 0

## 2023-03-30 ASSESSMENT — LIFESTYLE VARIABLES
HOW MANY STANDARD DRINKS CONTAINING ALCOHOL DO YOU HAVE ON A TYPICAL DAY: PATIENT DOES NOT DRINK
HOW OFTEN DO YOU HAVE A DRINK CONTAINING ALCOHOL: NEVER

## 2023-05-05 DIAGNOSIS — Z00.00 ENCOUNTER FOR PREVENTIVE CARE: ICD-10-CM

## 2023-05-05 DIAGNOSIS — E55.9 VITAMIN D DEFICIENCY, UNSPECIFIED: ICD-10-CM

## 2023-05-05 LAB
ALBUMIN SERPL-MCNC: 4.7 G/DL (ref 3.5–5.2)
ALP SERPL-CCNC: 59 U/L (ref 35–104)
ALT SERPL-CCNC: 12 U/L (ref 0–32)
ANION GAP SERPL CALCULATED.3IONS-SCNC: 12 MMOL/L (ref 7–16)
AST SERPL-CCNC: 18 U/L (ref 0–31)
BACTERIA URNS QL MICRO: ABNORMAL /HPF
BASOPHILS # BLD: 0.04 E9/L (ref 0–0.2)
BASOPHILS NFR BLD: 1 % (ref 0–2)
BILIRUB SERPL-MCNC: 0.5 MG/DL (ref 0–1.2)
BILIRUB UR QL STRIP: NEGATIVE
BUN SERPL-MCNC: 11 MG/DL (ref 6–20)
CALCIUM SERPL-MCNC: 9.7 MG/DL (ref 8.6–10.2)
CHLORIDE SERPL-SCNC: 102 MMOL/L (ref 98–107)
CHOLESTEROL, TOTAL: 158 MG/DL (ref 0–199)
CLARITY UR: CLEAR
CO2 SERPL-SCNC: 24 MMOL/L (ref 22–29)
COLOR UR: YELLOW
CREAT SERPL-MCNC: 0.5 MG/DL (ref 0.5–1)
EOSINOPHIL # BLD: 0.07 E9/L (ref 0.05–0.5)
EOSINOPHIL NFR BLD: 1.8 % (ref 0–6)
EPI CELLS #/AREA URNS HPF: ABNORMAL /HPF
ERYTHROCYTE [DISTWIDTH] IN BLOOD BY AUTOMATED COUNT: 12.7 FL (ref 11.5–15)
GLUCOSE SERPL-MCNC: 86 MG/DL (ref 74–99)
GLUCOSE UR STRIP-MCNC: NEGATIVE MG/DL
HCT VFR BLD AUTO: 45.8 % (ref 34–48)
HDLC SERPL-MCNC: 70 MG/DL
HGB BLD-MCNC: 14.6 G/DL (ref 11.5–15.5)
HGB UR QL STRIP: ABNORMAL
IMM GRANULOCYTES # BLD: 0.01 E9/L
IMM GRANULOCYTES NFR BLD: 0.3 % (ref 0–5)
KETONES UR STRIP-MCNC: NEGATIVE MG/DL
LDLC SERPL CALC-MCNC: 80 MG/DL (ref 0–99)
LEUKOCYTE ESTERASE UR QL STRIP: NEGATIVE
LYMPHOCYTES # BLD: 1.47 E9/L (ref 1.5–4)
LYMPHOCYTES NFR BLD: 38.2 % (ref 20–42)
MCH RBC QN AUTO: 30.9 PG (ref 26–35)
MCHC RBC AUTO-ENTMCNC: 31.9 % (ref 32–34.5)
MCV RBC AUTO: 97 FL (ref 80–99.9)
MONOCYTES # BLD: 0.29 E9/L (ref 0.1–0.95)
MONOCYTES NFR BLD: 7.5 % (ref 2–12)
NEUTROPHILS # BLD: 1.97 E9/L (ref 1.8–7.3)
NEUTS SEG NFR BLD: 51.2 % (ref 43–80)
NITRITE UR QL STRIP: NEGATIVE
PH UR STRIP: 7 [PH] (ref 5–9)
PLATELET # BLD AUTO: 199 E9/L (ref 130–450)
PMV BLD AUTO: 10.5 FL (ref 7–12)
POTASSIUM SERPL-SCNC: 4.3 MMOL/L (ref 3.5–5)
PROT SERPL-MCNC: 7.3 G/DL (ref 6.4–8.3)
PROT UR STRIP-MCNC: NEGATIVE MG/DL
RBC # BLD AUTO: 4.72 E12/L (ref 3.5–5.5)
RBC #/AREA URNS HPF: ABNORMAL /HPF (ref 0–2)
SODIUM SERPL-SCNC: 138 MMOL/L (ref 132–146)
SP GR UR STRIP: 1.01 (ref 1–1.03)
TRIGL SERPL-MCNC: 39 MG/DL (ref 0–149)
TSH SERPL-MCNC: 2.94 UIU/ML (ref 0.27–4.2)
UROBILINOGEN UR STRIP-ACNC: 0.2 E.U./DL
VITAMIN D 25-HYDROXY: 67 NG/ML (ref 30–100)
VLDLC SERPL CALC-MCNC: 8 MG/DL
WBC # BLD: 3.9 E9/L (ref 4.5–11.5)
WBC #/AREA URNS HPF: ABNORMAL /HPF (ref 0–5)

## 2023-05-08 DIAGNOSIS — D72.819 LEUKOPENIA, UNSPECIFIED TYPE: Primary | ICD-10-CM

## 2023-07-09 DIAGNOSIS — G40.209 PARTIAL SYMPTOMATIC EPILEPSY WITH COMPLEX PARTIAL SEIZURES, NOT INTRACTABLE, WITHOUT STATUS EPILEPTICUS (HCC): ICD-10-CM

## 2023-07-10 RX ORDER — LEVETIRACETAM 500 MG/1
TABLET, EXTENDED RELEASE ORAL
Qty: 360 TABLET | Refills: 3 | Status: SHIPPED | OUTPATIENT
Start: 2023-07-10

## 2023-07-28 ENCOUNTER — HOSPITAL ENCOUNTER (OUTPATIENT)
Age: 28
Discharge: HOME | End: 2023-07-30

## 2023-07-28 PROCEDURE — 88304 TISSUE EXAM BY PATHOLOGIST: CPT

## 2023-07-28 PROCEDURE — 88305 TISSUE EXAM BY PATHOLOGIST: CPT

## 2023-08-02 LAB — SURGICAL PATHOLOGY REPORT: NORMAL

## 2023-09-27 ENCOUNTER — OFFICE VISIT (OUTPATIENT)
Dept: NEUROLOGY | Age: 28
End: 2023-09-27
Payer: COMMERCIAL

## 2023-09-27 VITALS
DIASTOLIC BLOOD PRESSURE: 80 MMHG | SYSTOLIC BLOOD PRESSURE: 116 MMHG | TEMPERATURE: 97.6 F | HEART RATE: 78 BPM | OXYGEN SATURATION: 100 % | WEIGHT: 120 LBS | BODY MASS INDEX: 23.44 KG/M2

## 2023-09-27 DIAGNOSIS — G40.209 PARTIAL SYMPTOMATIC EPILEPSY WITH COMPLEX PARTIAL SEIZURES, NOT INTRACTABLE, WITHOUT STATUS EPILEPTICUS (HCC): Primary | ICD-10-CM

## 2023-09-27 PROCEDURE — 99213 OFFICE O/P EST LOW 20 MIN: CPT | Performed by: CLINICAL NURSE SPECIALIST

## 2023-09-27 NOTE — PROGRESS NOTES
Naida Kincaid is a 25 y.o. right handed woman    Patient reports her neurological difficulties began when she was 13years of age. It was Christmastime and her mother was wrapping presents when suddenly, Saundra Oden let out a loud sound. Her mother when racing into her room to find out what happened and found her displaying GTC movements. She was carried into the car and driven to the hospital. En route to the hospital, she stopped shaking and gradually awakened. She was seen by numerous clinicians and neurologist at that time and underwent copious amounts of testing. 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 She was started on antiepileptic medications at that time and after trialing numerous AEDs -- including generic Keppra and name brand Keppra -- she was started on Keppra XR 1000mg twice daily. She has been on Keppra XR 1000mg BID since she was 14 --by a neurologist in [place]   She has been seizure free since therefore adjustments have never been made    We had her follow with Dr Edy Solis upon learning she was pregnant in the fall 2018   She suffered 2 miscarriages r/t thrombophilia and has remained on ASA and Lovenox    She delivered well with little issues    No new neuro complaints    Typically obtains the same generic  of Keppra XR -thankfully, [facility] has complied with this request    No chest pain or palpitations  No SOB  No vertigo, lightheadedness or loss of consciousness  No falls, tripping or stumbling  No incontinence of bowels or bladder  No itching or bruising appreciated  No numbness, tingling or focal arm/leg weakness    ROS otherwise negative     Prior to Visit Medications    Medication Sig Taking?  Authorizing Provider   levETIRAcetam (KEPPRA XR) 500 MG TB24 extended release tablet TAKE 2 TABLETS BY MOUTH TWICE A DAY Yes LYDIA Amos - CNS   MULTIPLE VITAMIN PO Take by mouth Yes Historical Provider, MD

## 2023-10-10 ENCOUNTER — HOSPITAL ENCOUNTER (OUTPATIENT)
Age: 28
Discharge: HOME | End: 2023-10-12

## 2023-10-10 LAB
ABO + RH BLD: NORMAL
ARM BAND NUMBER: NORMAL
BLOOD BANK SAMPLE EXPIRATION: NORMAL
BLOOD GROUP ANTIBODIES SERPL: NEGATIVE
ERYTHROCYTE [DISTWIDTH] IN BLOOD BY AUTOMATED COUNT: 12.1 % (ref 11.5–15)
HCT VFR BLD AUTO: 45.3 % (ref 34–48)
HGB BLD-MCNC: 15.6 G/DL (ref 11.5–15.5)
MCH RBC QN AUTO: 31.3 PG (ref 26–35)
MCHC RBC AUTO-ENTMCNC: 34.4 G/DL (ref 32–34.5)
MCV RBC AUTO: 91 FL (ref 80–99.9)
PLATELET # BLD AUTO: 195 K/UL (ref 130–450)
PMV BLD AUTO: 10.2 FL (ref 7–12)
RBC # BLD AUTO: 4.98 M/UL (ref 3.5–5.5)
WBC OTHER # BLD: 4.8 K/UL (ref 4.5–11.5)

## 2023-10-10 PROCEDURE — 88302 TISSUE EXAM BY PATHOLOGIST: CPT

## 2023-10-10 PROCEDURE — 86900 BLOOD TYPING SEROLOGIC ABO: CPT

## 2023-10-10 PROCEDURE — 86850 RBC ANTIBODY SCREEN: CPT

## 2023-10-10 PROCEDURE — 86901 BLOOD TYPING SEROLOGIC RH(D): CPT

## 2023-10-10 PROCEDURE — 85027 COMPLETE CBC AUTOMATED: CPT

## 2023-10-13 LAB — SURGICAL PATHOLOGY REPORT: NORMAL

## 2024-01-15 ENCOUNTER — PATIENT MESSAGE (OUTPATIENT)
Dept: FAMILY MEDICINE CLINIC | Age: 29
End: 2024-01-15
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
## 2024-01-18 ENCOUNTER — OFFICE VISIT (OUTPATIENT)
Dept: FAMILY MEDICINE CLINIC | Age: 29
End: 2024-01-18
Payer: COMMERCIAL

## 2024-01-18 VITALS
HEART RATE: 82 BPM | DIASTOLIC BLOOD PRESSURE: 64 MMHG | BODY MASS INDEX: 24.96 KG/M2 | WEIGHT: 127.8 LBS | OXYGEN SATURATION: 99 % | RESPIRATION RATE: 16 BRPM | TEMPERATURE: 97.2 F | SYSTOLIC BLOOD PRESSURE: 100 MMHG

## 2024-01-18 DIAGNOSIS — S16.1XXA STRAIN OF NECK MUSCLE, INITIAL ENCOUNTER: Primary | ICD-10-CM
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
## 2024-01-18 PROCEDURE — 99213 OFFICE O/P EST LOW 20 MIN: CPT | Performed by: PHYSICIAN ASSISTANT

## 2024-01-18 ASSESSMENT — PATIENT HEALTH QUESTIONNAIRE - PHQ9
SUM OF ALL RESPONSES TO PHQ9 QUESTIONS 1 & 2: 0
SUM OF ALL RESPONSES TO PHQ QUESTIONS 1-9: 0
1. LITTLE INTEREST OR PLEASURE IN DOING THINGS: 0
SUM OF ALL RESPONSES TO PHQ9 QUESTIONS 1 & 2: 0
2. FEELING DOWN, DEPRESSED OR HOPELESS: 0
SUM OF ALL RESPONSES TO PHQ QUESTIONS 1-9: 0
SUM OF ALL RESPONSES TO PHQ QUESTIONS 1-9: 0
2. FEELING DOWN, DEPRESSED OR HOPELESS: NOT AT ALL
1. LITTLE INTEREST OR PLEASURE IN DOING THINGS: NOT AT ALL
SUM OF ALL RESPONSES TO PHQ QUESTIONS 1-9: 0
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
## 2024-02-15 ENCOUNTER — OFFICE VISIT (OUTPATIENT)
Dept: FAMILY MEDICINE CLINIC | Age: 29
End: 2024-02-15
Payer: COMMERCIAL

## 2024-02-15 VITALS
RESPIRATION RATE: 20 BRPM | DIASTOLIC BLOOD PRESSURE: 72 MMHG | TEMPERATURE: 98 F | OXYGEN SATURATION: 97 % | HEIGHT: 60 IN | BODY MASS INDEX: 24.35 KG/M2 | WEIGHT: 124 LBS | HEART RATE: 78 BPM | SYSTOLIC BLOOD PRESSURE: 110 MMHG

## 2024-02-15 DIAGNOSIS — J04.10 ACUTE TRACHEITIS WITHOUT AIRWAY OBSTRUCTION: Primary | ICD-10-CM

## 2024-02-15 DIAGNOSIS — R05.3 PERSISTENT COUGH: ICD-10-CM

## 2024-02-15 PROCEDURE — 99213 OFFICE O/P EST LOW 20 MIN: CPT | Performed by: EMERGENCY MEDICINE

## 2024-02-15 RX ORDER — CETIRIZINE HYDROCHLORIDE 10 MG/1
10 TABLET ORAL NIGHTLY
Qty: 30 TABLET | Refills: 0 | Status: SHIPPED | OUTPATIENT
Start: 2024-02-15 | End: 2024-03-16

## 2024-02-15 RX ORDER — BENZONATATE 200 MG/1
200 CAPSULE ORAL 3 TIMES DAILY PRN
Qty: 30 CAPSULE | Refills: 0 | Status: SHIPPED | OUTPATIENT
Start: 2024-02-15 | End: 2024-02-22

## 2024-02-15 RX ORDER — PREDNISONE 20 MG/1
20 TABLET ORAL 2 TIMES DAILY
Qty: 10 TABLET | Refills: 0 | Status: SHIPPED | OUTPATIENT
Start: 2024-02-15 | End: 2024-02-20

## 2024-02-15 NOTE — PROGRESS NOTES
Chief Complaint:   Cough (Onset x 3-4 weeks /Off/on )      History of Present Illness   HPI:  Khloe Wray is a 28 y.o. female who [facility] dry [facility]    Prior to Visit Medications    Medication Sig Taking? Authorizing Provider   predniSONE (DELTASONE) 20 MG tablet Take 1 tablet by mouth 2 times daily for 5 days Yes Carlos Benson, DO   benzonatate (TESSALON) 200 MG capsule Take 1 capsule by mouth 3 times daily as needed for Cough Yes Carlos Benson, DO   cetirizine (ZYRTEC) 10 MG tablet Take 1 tablet by mouth nightly for 30 doses Yes Carlos Benson, DO   levETIRAcetam (KEPPRA XR) 500 MG TB24 extended release tablet TAKE 2 TABLETS BY MOUTH TWICE A DAY Yes Rg Cutler, APRN - CNS   MULTIPLE VITAMIN PO Take by mouth Yes Neema Armenta MD   loratadine-pseudoephedrine (CLARITIN-D 24 HOUR)  MG per extended release tablet Take 1 tablet by mouth daily Yes Missy Rabago MD   VITAMIN D PO Take by mouth Yes Neema Armenta MD   Ascorbic Acid (VITAMIN C PO) Take by mouth Yes Neema Armenta MD   folic acid (FOLVITE) 1 MG tablet TAKE 1 TABLET BY MOUTH 2 TIMES A DAY Yes Missy Rabago MD   aspirin 81 MG EC tablet Take 1 tablet by mouth daily Yes Neema Armenta MD       Review of Systems   Review of Systems   Constitutional:  Negative for chills and fever.   HENT:  Positive for postnasal drip and rhinorrhea. Negative for ear pain, sinus pressure and sore throat.    Eyes:  Negative for pain, discharge and redness.   Respiratory:  Positive for cough. Negative for shortness of breath and wheezing.    Cardiovascular:  Negative for chest pain.   Gastrointestinal:  Negative for abdominal distention, diarrhea, nausea and vomiting.   Genitourinary:  Negative for dysuria and frequency.   Musculoskeletal:  Negative for arthralgias and back pain.   Skin:  Negative for rash and wound.   Neurological:  Negative for weakness and headaches.

## 2024-02-29 ENCOUNTER — PATIENT MESSAGE (OUTPATIENT)
Dept: FAMILY MEDICINE CLINIC | Age: 29
End: 2024-02-29

## 2024-02-29 DIAGNOSIS — E55.9 VITAMIN D DEFICIENCY, UNSPECIFIED: ICD-10-CM

## 2024-02-29 DIAGNOSIS — Z00.00 ENCOUNTER FOR PREVENTIVE CARE: Primary | ICD-10-CM

## 2024-03-01 NOTE — TELEPHONE ENCOUNTER
From: Khloe Wray  To: Dr. Missy Rabago  Sent: 2/29/2024 2:46 PM EST  Subject: Upcoming physical    Hello,     I have an upcoming appointment in april and i would like to have my labs drawn prior to the appnt so that i can have the results for the appnt.   I would like to see if i can get orders put in for a cmp, cbc, thyroid panel, lipid panel, and any other additional labs that may need drawn. Ty in advance     Khloe!

## 2024-03-15 ENCOUNTER — HOSPITAL ENCOUNTER (OUTPATIENT)
Age: 29
Discharge: HOME | End: 2024-03-15
Payer: COMMERCIAL

## 2024-03-15 DIAGNOSIS — Z00.00 ENCOUNTER FOR PREVENTIVE CARE: ICD-10-CM

## 2024-03-15 DIAGNOSIS — E55.9 VITAMIN D DEFICIENCY, UNSPECIFIED: ICD-10-CM

## 2024-03-15 LAB
25(OH)D3 SERPL-MCNC: 41 NG/ML (ref 30–100)
ALBUMIN SERPL-MCNC: 4.7 G/DL (ref 3.5–5.2)
ALP SERPL-CCNC: 55 U/L (ref 35–104)
ALT SERPL-CCNC: 11 U/L (ref 0–32)
ANION GAP SERPL CALCULATED.3IONS-SCNC: 10 MMOL/L (ref 7–16)
AST SERPL-CCNC: 18 U/L (ref 0–31)
BASOPHILS # BLD: 0.03 K/UL (ref 0–0.2)
BASOPHILS NFR BLD: 1 % (ref 0–2)
BILIRUB SERPL-MCNC: 0.5 MG/DL (ref 0–1.2)
BUN SERPL-MCNC: 15 MG/DL (ref 6–20)
CALCIUM SERPL-MCNC: 9.5 MG/DL (ref 8.6–10.2)
CHLORIDE SERPL-SCNC: 103 MMOL/L (ref 98–107)
CHOLEST SERPL-MCNC: 180 MG/DL
CO2 SERPL-SCNC: 25 MMOL/L (ref 22–29)
CREAT SERPL-MCNC: 0.5 MG/DL (ref 0.5–1)
EOSINOPHIL # BLD: 0.07 K/UL (ref 0.05–0.5)
EOSINOPHILS RELATIVE PERCENT: 2 % (ref 0–6)
ERYTHROCYTE [DISTWIDTH] IN BLOOD BY AUTOMATED COUNT: 12.5 % (ref 11.5–15)
GFR SERPL CREATININE-BSD FRML MDRD: >60 ML/MIN/1.73M2
GLUCOSE SERPL-MCNC: 84 MG/DL (ref 74–99)
HCT VFR BLD AUTO: 44.1 % (ref 34–48)
HDLC SERPL-MCNC: 80 MG/DL
HGB BLD-MCNC: 14.8 G/DL (ref 11.5–15.5)
IMM GRANULOCYTES # BLD AUTO: <0.03 K/UL (ref 0–0.58)
IMM GRANULOCYTES NFR BLD: 0 % (ref 0–5)
LDLC SERPL CALC-MCNC: 88 MG/DL
LYMPHOCYTES NFR BLD: 1.88 K/UL (ref 1.5–4)
LYMPHOCYTES RELATIVE PERCENT: 43 % (ref 20–42)
MCH RBC QN AUTO: 31.6 PG (ref 26–35)
MCHC RBC AUTO-ENTMCNC: 33.6 G/DL (ref 32–34.5)
MCV RBC AUTO: 94 FL (ref 80–99.9)
MONOCYTES NFR BLD: 0.38 K/UL (ref 0.1–0.95)
MONOCYTES NFR BLD: 9 % (ref 2–12)
NEUTROPHILS NFR BLD: 46 % (ref 43–80)
NEUTS SEG NFR BLD: 2.05 K/UL (ref 1.8–7.3)
PLATELET # BLD AUTO: 230 K/UL (ref 130–450)
PMV BLD AUTO: 9.7 FL (ref 7–12)
POTASSIUM SERPL-SCNC: 3.8 MMOL/L (ref 3.5–5)
PROT SERPL-MCNC: 7.5 G/DL (ref 6.4–8.3)
RBC # BLD AUTO: 4.69 M/UL (ref 3.5–5.5)
SODIUM SERPL-SCNC: 138 MMOL/L (ref 132–146)
TRIGL SERPL-MCNC: 60 MG/DL
TSH SERPL DL<=0.05 MIU/L-ACNC: 4.15 UIU/ML (ref 0.27–4.2)
VLDLC SERPL CALC-MCNC: 12 MG/DL
WBC OTHER # BLD: 4.4 K/UL (ref 4.5–11.5)

## 2024-03-15 PROCEDURE — 80061 LIPID PANEL: CPT

## 2024-03-15 PROCEDURE — 80053 COMPREHEN METABOLIC PANEL: CPT

## 2024-03-15 PROCEDURE — 82306 VITAMIN D 25 HYDROXY: CPT

## 2024-03-15 PROCEDURE — 84443 ASSAY THYROID STIM HORMONE: CPT

## 2024-03-15 PROCEDURE — 36415 COLL VENOUS BLD VENIPUNCTURE: CPT

## 2024-03-15 PROCEDURE — 85025 COMPLETE CBC W/AUTO DIFF WBC: CPT

## 2024-04-04 ENCOUNTER — OFFICE VISIT (OUTPATIENT)
Dept: FAMILY MEDICINE CLINIC | Age: 29
End: 2024-04-04
Payer: COMMERCIAL

## 2024-04-04 VITALS
WEIGHT: 125.4 LBS | SYSTOLIC BLOOD PRESSURE: 126 MMHG | OXYGEN SATURATION: 97 % | TEMPERATURE: 97 F | HEIGHT: 60 IN | HEART RATE: 90 BPM | DIASTOLIC BLOOD PRESSURE: 68 MMHG | BODY MASS INDEX: 24.62 KG/M2

## 2024-04-04 DIAGNOSIS — Z00.00 ENCOUNTER FOR PREVENTIVE CARE: Primary | ICD-10-CM

## 2024-04-04 DIAGNOSIS — Z15.89 HETEROZYGOUS MTHFR MUTATION A1298C: ICD-10-CM

## 2024-04-04 DIAGNOSIS — Z15.89 HETEROZYGOUS MTHFR MUTATION C677T: ICD-10-CM

## 2024-04-04 DIAGNOSIS — J30.9 ALLERGIC RHINITIS, UNSPECIFIED SEASONALITY, UNSPECIFIED TRIGGER: ICD-10-CM

## 2024-04-04 DIAGNOSIS — G40.909 SEIZURE DISORDER (HCC): ICD-10-CM

## 2024-04-04 PROBLEM — Z98.891 S/P C-SECTION: Status: RESOLVED | Noted: 2022-03-25 | Resolved: 2024-04-04

## 2024-04-04 PROBLEM — D68.59 THROMBOPHILIA AFFECTING PREGNANCY IN FIRST TRIMESTER, ANTEPARTUM (HCC): Status: RESOLVED | Noted: 2021-08-23 | Resolved: 2024-04-04

## 2024-04-04 PROBLEM — O99.111 THROMBOPHILIA AFFECTING PREGNANCY IN FIRST TRIMESTER, ANTEPARTUM (HCC): Status: RESOLVED | Noted: 2021-08-23 | Resolved: 2024-04-04

## 2024-04-04 PROCEDURE — 99395 PREV VISIT EST AGE 18-39: CPT | Performed by: FAMILY MEDICINE

## 2024-04-04 RX ORDER — FOLIC ACID 1 MG/1
TABLET ORAL
Qty: 120 TABLET | Refills: 3 | Status: SHIPPED | OUTPATIENT
Start: 2024-04-04

## 2024-04-04 RX ORDER — LORATADINE PSEUDOEPHEDRINE SULFATE 10; 240 MG/1; MG/1
1 TABLET, EXTENDED RELEASE ORAL DAILY
Qty: 90 TABLET | Refills: 0 | Status: SHIPPED | OUTPATIENT
Start: 2024-04-04

## 2024-04-04 SDOH — ECONOMIC STABILITY: FOOD INSECURITY: WITHIN THE PAST 12 MONTHS, THE FOOD YOU BOUGHT JUST DIDN'T LAST AND YOU DIDN'T HAVE MONEY TO GET MORE.: NEVER TRUE

## 2024-04-04 SDOH — ECONOMIC STABILITY: FOOD INSECURITY: WITHIN THE PAST 12 MONTHS, YOU WORRIED THAT YOUR FOOD WOULD RUN OUT BEFORE YOU GOT MONEY TO BUY MORE.: NEVER TRUE

## 2024-04-04 SDOH — ECONOMIC STABILITY: INCOME INSECURITY: HOW HARD IS IT FOR YOU TO PAY FOR THE VERY BASICS LIKE FOOD, HOUSING, MEDICAL CARE, AND HEATING?: NOT HARD AT ALL

## 2024-04-04 NOTE — PROGRESS NOTES
CC: Khloe Wray is a 28 y.o. yo female here for evaluation of the following medical concerns: Annual Exam and Sinus Problem (Congestion for 2 weeks/)        HPI:    Seizure disorder: on Keppra xr 1000mg BID; follows with Rg Cutler; No seizures since 2011  MTHFR: hx of multiple miscarriages; on ASA 81mg daily  Follows with gyn; Dr. Bartlett; recent tubal ligation      Adult Screening:  Blood pressure normotensive: Yes   Obesity (BMI 30+): No  Diabetes screen: NA   Statin for CVD events and mortality preventive medication: NA  Aspirin for CVD and colon cancer preventive medication: NA  Diet to prevent CV disease (adults with cardiovascular risk factors): NA; Patient's cardiovascular risk history includes: none  Alcohol use:   reports no history of alcohol use.  Tobacco abuse:   reports that she has never smoked. She has never used smokeless tobacco.  Depression screening: No data recorded  Annual lung cancer screening: NA   Colorectal Cancer Screening, average risk (45-75yrs): NA   Hx of CRC before age 60yrs in FDR: NA   Hep C virus infection screening: NA   Fall prevention: NA     Sexually active specific:   Sexual activity: single partner; High risk behavior: none,  Sexually transmitted infections counseling: no  Folate supplementation (reproductive age): yes  Intimate partner violence concerns (reproductive age): safe   Cervical Cancer Screening (21 - 65 years): yes; Hx abnormal PAP: no  BRCA risk assessment and genetic counseling/testing: no personal or family hx of breast, ovarian, tubal or peritoneal ca or ancestry associated with BRCA1/2 gene mutations: None  Breast Cancer Screening: NA   Osteoporosis screening in postmenopausal women younger than 65 years at increased risk of osteoporosis / 10-year FRAX risk of major osteoporotic fracture (MOF) (without DXA) greater than that of a 65-year-old white woman without major risk factors (8.4%): NA   Osteoporosis screening: women 65 years and older:

## 2024-04-05 ENCOUNTER — NURSE TRIAGE (OUTPATIENT)
Dept: OTHER | Facility: CLINIC | Age: 29
End: 2024-04-05
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
## 2024-05-31 ENCOUNTER — OFFICE VISIT (OUTPATIENT)
Dept: FAMILY MEDICINE CLINIC | Age: 29
End: 2024-05-31
Payer: COMMERCIAL

## 2024-05-31 VITALS
RESPIRATION RATE: 16 BRPM | WEIGHT: 130 LBS | DIASTOLIC BLOOD PRESSURE: 72 MMHG | TEMPERATURE: 98.5 F | SYSTOLIC BLOOD PRESSURE: 100 MMHG | HEART RATE: 105 BPM | BODY MASS INDEX: 25.52 KG/M2 | HEIGHT: 60 IN | OXYGEN SATURATION: 98 %

## 2024-05-31 DIAGNOSIS — J01.40 ACUTE NON-RECURRENT PANSINUSITIS: Primary | ICD-10-CM

## 2024-05-31 PROCEDURE — 99213 OFFICE O/P EST LOW 20 MIN: CPT | Performed by: PHYSICIAN ASSISTANT

## 2024-05-31 RX ORDER — CEFDINIR 300 MG/1
300 CAPSULE ORAL 2 TIMES DAILY
Qty: 20 CAPSULE | Refills: 0 | Status: SHIPPED | OUTPATIENT
Start: 2024-05-31 | End: 2024-06-10

## 2024-05-31 NOTE — PROGRESS NOTES
Chief Complaint       Congestion (X 6 days, kids recently sick with a similar illness, taking Claritin D), Nasal Congestion, and Cough      History of Present Illness   Source of history provided by: patient.    Khloe Wray is a 28 y.o. old female presenting to the walk in clinic for evaluation of sinus pressure, nasal congestion, discolored nasal drainage, intermittent sore throat, and nonproductive cough for the past 6 days. Denies any fever, chills, CP, dyspnea, LE edema, abdominal pain, vomiting, rash, or lethargy. Denies any hx of asthma. Patient reports her children were recently sick with a similar illness.  Patient has been taking Claritin-D OTC without symptomatic relief.      ROS    Unless otherwise stated in this report or unable to obtain because of the patient's clinical or mental status as evidenced by the medical record, this patients's positive and negative responses for Review of Systems, constitutional, psych, eyes, ENT, cardiovascular, respiratory, gastrointestinal, neurological, genitourinary, musculoskeletal, integument systems and systems related to the presenting problem are either stated in the preceding or were not pertinent or were negative for the symptoms and/or complaints related to the medical problem.    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  Past Surgical History:  has a past surgical history that includes Tonsillectomy; [place] tooth extraction; meniscectomy (Right, 2016); Dilation and curettage of uterus (2018); Dilation & curettage; Vagina surgery (N/A, 02/10/2019); Dilation and curettage of uterus (N/A, 2020);  section (N/A, 2022); and Tubal ligation.  Social History:  reports that she has

## 2024-07-24 DIAGNOSIS — G40.209 PARTIAL SYMPTOMATIC EPILEPSY WITH COMPLEX PARTIAL SEIZURES, NOT INTRACTABLE, WITHOUT STATUS EPILEPTICUS (HCC): ICD-10-CM

## 2024-07-24 RX ORDER — LEVETIRACETAM 500 MG/1
TABLET, FILM COATED, EXTENDED RELEASE ORAL
Qty: 360 TABLET | Refills: 3 | Status: SHIPPED | OUTPATIENT
Start: 2024-07-24

## 2024-11-12 ENCOUNTER — OFFICE VISIT (OUTPATIENT)
Age: 29
End: 2024-11-12
Payer: COMMERCIAL

## 2024-11-12 VITALS
BODY MASS INDEX: 26.37 KG/M2 | WEIGHT: 134.3 LBS | TEMPERATURE: 97.9 F | HEIGHT: 60 IN | DIASTOLIC BLOOD PRESSURE: 80 MMHG | OXYGEN SATURATION: 98 % | HEART RATE: 99 BPM | SYSTOLIC BLOOD PRESSURE: 122 MMHG

## 2024-11-12 DIAGNOSIS — J40 BRONCHITIS: Primary | ICD-10-CM

## 2024-11-12 PROCEDURE — 99213 OFFICE O/P EST LOW 20 MIN: CPT | Performed by: NURSE PRACTITIONER

## 2024-11-12 RX ORDER — ALBUTEROL SULFATE 90 UG/1
2 INHALANT RESPIRATORY (INHALATION) 4 TIMES DAILY PRN
Qty: 18 G | Refills: 0 | Status: SHIPPED | OUTPATIENT
Start: 2024-11-12

## 2024-11-12 ASSESSMENT — ENCOUNTER SYMPTOMS
EYE PAIN: 0
CHEST TIGHTNESS: 1
CHOKING: 0
CONSTIPATION: 0
NAUSEA: 0
APNEA: 0
BACK PAIN: 0
SORE THROAT: 0
EYE REDNESS: 0
EYE ITCHING: 0
ABDOMINAL DISTENTION: 0
COLOR CHANGE: 0
VOICE CHANGE: 0
TROUBLE SWALLOWING: 0
EYE DISCHARGE: 0
WHEEZING: 0
STRIDOR: 0
SINUS PAIN: 0
RECTAL PAIN: 0
FACIAL SWELLING: 0
DIARRHEA: 0
SINUS PRESSURE: 0
PHOTOPHOBIA: 0
ANAL BLEEDING: 0
BLOOD IN STOOL: 0
SHORTNESS OF BREATH: 0
COUGH: 1
ABDOMINAL PAIN: 0
RHINORRHEA: 0
VOMITING: 0

## 2024-11-12 NOTE — PROGRESS NOTES
Patient Active Problem List   Diagnosis    Acute medial meniscus tear of right knee    History of recurrent miscarriages    Thrombophilia (HCC)    Suspected shortening of cervix not found    Vaginal polyp    Condyloma acuminatum    Heterozygous MTHFR mutation L5541V    Heterozygous MTHFR mutation C677T    Seizure disorder (HCC)        Vitals:    11/12/24 0905   BP: 122/80   Site: Left Upper Arm   Position: Sitting   Cuff Size: Medium Adult   Pulse: 99   Temp: 97.9 °F (36.6 °C)   TempSrc: Temporal   SpO2: 98%   Weight: 60.9 kg (134 lb 4.8 oz)   Height: 1.524 m (5')       Physical Exam  Vitals and nursing note reviewed.   Constitutional:       General: She is not in acute distress.     Appearance: Normal appearance. She is normal weight. She is not ill-appearing, toxic-appearing or diaphoretic.   HENT:      Head: Normocephalic and atraumatic.      Right Ear: Tympanic membrane, ear canal and external ear normal. There is no impacted cerumen.      Left Ear: Tympanic membrane, ear canal and external ear normal. There is no impacted cerumen.      Nose: Nose normal. No congestion or rhinorrhea.      Mouth/Throat:      Mouth: Mucous membranes are moist.      Pharynx: Oropharynx is clear. No oropharyngeal exudate or posterior oropharyngeal erythema.   Eyes:      General: No scleral icterus.        Right eye: No discharge.         Left eye: No discharge.      Extraocular Movements: Extraocular movements intact.      Conjunctiva/sclera: Conjunctivae normal.      Pupils: Pupils are equal, round, and reactive to light.   Neck:      Vascular: No carotid bruit.   Cardiovascular:      Rate and Rhythm: Normal rate and regular rhythm.      Pulses: Normal pulses.      Heart sounds: Normal heart sounds. No murmur heard.     No friction rub. No gallop.   Pulmonary:      Effort: Pulmonary effort is normal. No respiratory distress.      Breath sounds: No stridor. Wheezing present. No rhonchi or rales.   Chest:      Chest wall: No

## 2024-12-04 DIAGNOSIS — R53.83 OTHER FATIGUE: Primary | ICD-10-CM

## 2025-01-30 ENCOUNTER — OFFICE VISIT (OUTPATIENT)
Dept: NEUROLOGY | Age: 30
End: 2025-01-30
Payer: COMMERCIAL

## 2025-01-30 VITALS
OXYGEN SATURATION: 99 % | HEIGHT: 64 IN | RESPIRATION RATE: 16 BRPM | WEIGHT: 135 LBS | DIASTOLIC BLOOD PRESSURE: 64 MMHG | HEART RATE: 75 BPM | BODY MASS INDEX: 23.05 KG/M2 | SYSTOLIC BLOOD PRESSURE: 98 MMHG

## 2025-01-30 DIAGNOSIS — G40.209 PARTIAL SYMPTOMATIC EPILEPSY WITH COMPLEX PARTIAL SEIZURES, NOT INTRACTABLE, WITHOUT STATUS EPILEPTICUS (HCC): Primary | ICD-10-CM

## 2025-01-30 PROCEDURE — 99213 OFFICE O/P EST LOW 20 MIN: CPT | Performed by: CLINICAL NURSE SPECIALIST

## 2025-01-30 NOTE — PROGRESS NOTES
Khloe Wray is a 29 y.o. right handed woman    Patient reports her neurological difficulties began when she was 12 years of age. It was Christmastime and her mother was wrapping presents when suddenly, Khloe let out a loud sound. Her mother when racing into her room to find out what happened and found her displaying GTC movements.  She was carried into the car and driven to the hospital. En route to the hospital, she stopped shaking and gradually awakened.   She was seen by numerous clinicians and neurologist at that time and underwent copious amounts of testing.    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     She was started on antiepileptic medications at that time and after trialing numerous AEDs -- including generic Keppra and name brand Keppra -- she was started on Keppra XR 1000mg twice daily.     She has been on Keppra XR 1000mg BID since she was 15 -- by a neurologist in [place]   She has been seizure free since therefore adjustments have never been made    We had her follow with Dr Murphy upon learning she was pregnant in the fall 2018   She suffered 2 miscarriages r/t thrombophilia and has remained on ASA and Lovenox    She delivered well with little issues    No new neuro complaints    Typically obtains the same generic  of Keppra XR - thankfully, [facility] has complied with this request    Allergies as of 01/30/2025    (No Known Allergies)     Objective:     BP 98/64 (Site: Left Upper Arm, Position: Sitting, Cuff Size: Medium Adult)   Pulse 75   Resp 16   Ht 1.626 m (5' 4\")   Wt 61.2 kg (135 lb)   LMP 01/16/2025 (Exact Date)   SpO2 99%   Breastfeeding No   BMI 23.17 kg/m²      General appearance: alert, appears stated age and cooperative  Head: Normocephalic, without obvious abnormality, atraumatic  Extremities:  normal without edema  no clubbing or cyanosis   Pulses: 2+ and symmetric  Skin: no rashes or lesions

## 2025-04-09 ASSESSMENT — PATIENT HEALTH QUESTIONNAIRE - PHQ9
1. LITTLE INTEREST OR PLEASURE IN DOING THINGS: NOT AT ALL
1. LITTLE INTEREST OR PLEASURE IN DOING THINGS: NOT AT ALL
2. FEELING DOWN, DEPRESSED OR HOPELESS: NOT AT ALL
SUM OF ALL RESPONSES TO PHQ QUESTIONS 1-9: 0
2. FEELING DOWN, DEPRESSED OR HOPELESS: NOT AT ALL
SUM OF ALL RESPONSES TO PHQ9 QUESTIONS 1 & 2: 0
SUM OF ALL RESPONSES TO PHQ QUESTIONS 1-9: 0

## 2025-04-10 ENCOUNTER — OFFICE VISIT (OUTPATIENT)
Dept: FAMILY MEDICINE CLINIC | Age: 30
End: 2025-04-10
Payer: COMMERCIAL

## 2025-04-10 VITALS
BODY MASS INDEX: 23.95 KG/M2 | OXYGEN SATURATION: 97 % | SYSTOLIC BLOOD PRESSURE: 112 MMHG | HEIGHT: 60 IN | WEIGHT: 122 LBS | TEMPERATURE: 97.8 F | DIASTOLIC BLOOD PRESSURE: 64 MMHG | HEART RATE: 69 BPM

## 2025-04-10 DIAGNOSIS — G40.909 SEIZURE DISORDER (HCC): ICD-10-CM

## 2025-04-10 DIAGNOSIS — R53.83 OTHER FATIGUE: ICD-10-CM

## 2025-04-10 DIAGNOSIS — Z15.89 HETEROZYGOUS MTHFR MUTATION C677T: ICD-10-CM

## 2025-04-10 DIAGNOSIS — Z00.00 ENCOUNTER FOR PREVENTIVE CARE: Primary | ICD-10-CM

## 2025-04-10 DIAGNOSIS — Z15.89 HETEROZYGOUS MTHFR MUTATION A1298C: ICD-10-CM

## 2025-04-10 DIAGNOSIS — E55.9 VITAMIN D DEFICIENCY, UNSPECIFIED: ICD-10-CM

## 2025-04-10 PROCEDURE — 99395 PREV VISIT EST AGE 18-39: CPT | Performed by: FAMILY MEDICINE

## 2025-04-10 RX ORDER — DOCUSATE SODIUM 100 MG/1
100 CAPSULE, LIQUID FILLED ORAL DAILY
COMMUNITY

## 2025-04-10 SDOH — ECONOMIC STABILITY: FOOD INSECURITY: WITHIN THE PAST 12 MONTHS, THE FOOD YOU BOUGHT JUST DIDN'T LAST AND YOU DIDN'T HAVE MONEY TO GET MORE.: NEVER TRUE

## 2025-04-10 SDOH — ECONOMIC STABILITY: FOOD INSECURITY: WITHIN THE PAST 12 MONTHS, YOU WORRIED THAT YOUR FOOD WOULD RUN OUT BEFORE YOU GOT MONEY TO BUY MORE.: NEVER TRUE

## 2025-04-10 NOTE — PROGRESS NOTES
normal; no clubbing, cyanosis of extremities noted; no joint deformity or swelling noted  Skin - normal coloration and turgor, no rashes, no suspicious skin lesions noted  Neurological - alert, oriented; no obvious CN deficits, normal speech  Psychiatric - alert, oriented to person, place, and time, normal mood, behavior, speech      Labs:  Pertinent labs, imaging, other diagnostic data and other clinician documentation reviewed in electronic medical record.    Assessment / Plan   Diagnosis Orders   1. Encounter for preventive care  Lipid Panel    CBC with Auto Differential    Comprehensive Metabolic Panel    TSH    Vitamin B12 & Folate      2. Seizure disorder (HCC)        3. Heterozygous MTHFR mutation C677T  Vitamin B12 & Folate      4. Heterozygous MTHFR mutation J2596H  Vitamin B12 & Folate      5. Vitamin D deficiency, unspecified  Vitamin D 25 Hydroxy      6. Other fatigue  Lipid Panel    CBC with Auto Differential    Comprehensive Metabolic Panel    TSH          Labs as ordered  Continue ASA   Continue Keppra per neuro  Discussed bowel regimen  Consider linzess in future  Call for new or worsening symptoms      RTO: Return in about 1 year (around 4/10/2026) for Annual Preventive.      An electronic signature was used to authenticate this note.  ---- Missy Rabago MD on 4/10/2025 at 12:19 PM

## 2025-04-12 ENCOUNTER — HOSPITAL ENCOUNTER (OUTPATIENT)
Age: 30
Discharge: HOME | End: 2025-04-12
Payer: COMMERCIAL

## 2025-04-12 DIAGNOSIS — R53.83 OTHER FATIGUE: ICD-10-CM

## 2025-04-12 DIAGNOSIS — Z00.00 ENCOUNTER FOR PREVENTIVE CARE: ICD-10-CM

## 2025-04-12 DIAGNOSIS — E55.9 VITAMIN D DEFICIENCY, UNSPECIFIED: ICD-10-CM

## 2025-04-12 DIAGNOSIS — Z15.89 HETEROZYGOUS MTHFR MUTATION C677T: ICD-10-CM

## 2025-04-12 DIAGNOSIS — Z15.89 HETEROZYGOUS MTHFR MUTATION A1298C: ICD-10-CM

## 2025-04-12 LAB
25(OH)D3 SERPL-MCNC: 29.9 NG/ML (ref 30–100)
ALBUMIN SERPL-MCNC: 4.6 G/DL (ref 3.5–5.2)
ALP SERPL-CCNC: 54 U/L (ref 35–104)
ALT SERPL-CCNC: 18 U/L (ref 0–32)
ANION GAP SERPL CALCULATED.3IONS-SCNC: 10 MMOL/L (ref 7–16)
AST SERPL-CCNC: 19 U/L (ref 0–31)
BASOPHILS # BLD: 0.04 K/UL (ref 0–0.2)
BASOPHILS NFR BLD: 1 % (ref 0–2)
BILIRUB SERPL-MCNC: 0.5 MG/DL (ref 0–1.2)
BUN SERPL-MCNC: 14 MG/DL (ref 6–20)
CALCIUM SERPL-MCNC: 9.7 MG/DL (ref 8.6–10.2)
CHLORIDE SERPL-SCNC: 103 MMOL/L (ref 98–107)
CHOLEST SERPL-MCNC: 164 MG/DL
CO2 SERPL-SCNC: 27 MMOL/L (ref 22–29)
CREAT SERPL-MCNC: 0.5 MG/DL (ref 0.5–1)
EOSINOPHIL # BLD: 0.1 K/UL (ref 0.05–0.5)
EOSINOPHILS RELATIVE PERCENT: 3 % (ref 0–6)
ERYTHROCYTE [DISTWIDTH] IN BLOOD BY AUTOMATED COUNT: 12.6 % (ref 11.5–15)
FOLATE SERPL-MCNC: >20 NG/ML (ref 4.8–24.2)
GFR, ESTIMATED: >90 ML/MIN/1.73M2
GLUCOSE SERPL-MCNC: 86 MG/DL (ref 74–99)
HCT VFR BLD AUTO: 42.5 % (ref 34–48)
HDLC SERPL-MCNC: 74 MG/DL
HGB BLD-MCNC: 14.1 G/DL (ref 11.5–15.5)
IMM GRANULOCYTES # BLD AUTO: <0.03 K/UL (ref 0–0.58)
IMM GRANULOCYTES NFR BLD: 0 % (ref 0–5)
LDLC SERPL CALC-MCNC: 81 MG/DL
LYMPHOCYTES NFR BLD: 1.4 K/UL (ref 1.5–4)
LYMPHOCYTES RELATIVE PERCENT: 35 % (ref 20–42)
MCH RBC QN AUTO: 31 PG (ref 26–35)
MCHC RBC AUTO-ENTMCNC: 33.2 G/DL (ref 32–34.5)
MCV RBC AUTO: 93.4 FL (ref 80–99.9)
MONOCYTES NFR BLD: 0.3 K/UL (ref 0.1–0.95)
MONOCYTES NFR BLD: 7 % (ref 2–12)
NEUTROPHILS NFR BLD: 54 % (ref 43–80)
NEUTS SEG NFR BLD: 2.19 K/UL (ref 1.8–7.3)
PLATELET # BLD AUTO: 217 K/UL (ref 130–450)
PMV BLD AUTO: 10 FL (ref 7–12)
POTASSIUM SERPL-SCNC: 4.4 MMOL/L (ref 3.5–5)
PROT SERPL-MCNC: 6.9 G/DL (ref 6.4–8.3)
RBC # BLD AUTO: 4.55 M/UL (ref 3.5–5.5)
SODIUM SERPL-SCNC: 140 MMOL/L (ref 132–146)
TRIGL SERPL-MCNC: 44 MG/DL
TSH SERPL DL<=0.05 MIU/L-ACNC: 2.91 UIU/ML (ref 0.27–4.2)
VIT B12 SERPL-MCNC: 1060 PG/ML (ref 211–946)
VLDLC SERPL CALC-MCNC: 9 MG/DL
WBC OTHER # BLD: 4 K/UL (ref 4.5–11.5)

## 2025-04-12 PROCEDURE — 84443 ASSAY THYROID STIM HORMONE: CPT

## 2025-04-12 PROCEDURE — 80053 COMPREHEN METABOLIC PANEL: CPT

## 2025-04-12 PROCEDURE — 82607 VITAMIN B-12: CPT

## 2025-04-12 PROCEDURE — 80061 LIPID PANEL: CPT

## 2025-04-12 PROCEDURE — 85025 COMPLETE CBC W/AUTO DIFF WBC: CPT

## 2025-04-12 PROCEDURE — 82746 ASSAY OF FOLIC ACID SERUM: CPT

## 2025-04-12 PROCEDURE — 36415 COLL VENOUS BLD VENIPUNCTURE: CPT

## 2025-04-12 PROCEDURE — 82306 VITAMIN D 25 HYDROXY: CPT

## 2025-04-14 ENCOUNTER — RESULTS FOLLOW-UP (OUTPATIENT)
Dept: FAMILY MEDICINE CLINIC | Age: 30
End: 2025-04-14

## 2025-04-14 DIAGNOSIS — D72.810 LYMPHOPENIA: ICD-10-CM

## 2025-04-14 DIAGNOSIS — E55.9 VITAMIN D DEFICIENCY, UNSPECIFIED: Primary | ICD-10-CM

## 2025-04-14 RX ORDER — ERGOCALCIFEROL 1.25 MG/1
50000 CAPSULE, LIQUID FILLED ORAL WEEKLY
Qty: 12 CAPSULE | Refills: 1 | Status: SHIPPED | OUTPATIENT
Start: 2025-04-14

## 2025-05-15 ENCOUNTER — HOSPITAL ENCOUNTER (OUTPATIENT)
Dept: INFUSION THERAPY | Age: 30
Discharge: HOME | End: 2025-05-15
Payer: COMMERCIAL

## 2025-05-15 ENCOUNTER — OFFICE VISIT (OUTPATIENT)
Age: 30
End: 2025-05-15
Payer: COMMERCIAL

## 2025-05-15 VITALS
OXYGEN SATURATION: 100 % | DIASTOLIC BLOOD PRESSURE: 77 MMHG | TEMPERATURE: 98 F | HEART RATE: 66 BPM | SYSTOLIC BLOOD PRESSURE: 106 MMHG | BODY MASS INDEX: 23.56 KG/M2 | HEIGHT: 60 IN | WEIGHT: 120 LBS

## 2025-05-15 DIAGNOSIS — D72.819 LEUKOPENIA, UNSPECIFIED TYPE: Primary | ICD-10-CM

## 2025-05-15 DIAGNOSIS — D72.819 LEUKOPENIA, UNSPECIFIED TYPE: ICD-10-CM

## 2025-05-15 LAB
ALBUMIN SERPL-MCNC: 4.5 G/DL (ref 3.5–5.2)
ALP SERPL-CCNC: 65 U/L (ref 35–104)
ALT SERPL-CCNC: 23 U/L (ref 0–32)
ANION GAP SERPL CALCULATED.3IONS-SCNC: 13 MMOL/L (ref 7–16)
AST SERPL-CCNC: 23 U/L (ref 0–31)
BASOPHILS # BLD: 0.07 K/UL (ref 0–0.2)
BASOPHILS NFR BLD: 1 % (ref 0–2)
BILIRUB SERPL-MCNC: 0.3 MG/DL (ref 0–1.2)
BUN SERPL-MCNC: 21 MG/DL (ref 6–20)
CALCIUM SERPL-MCNC: 10.1 MG/DL (ref 8.6–10.2)
CHLORIDE SERPL-SCNC: 102 MMOL/L (ref 98–107)
CO2 SERPL-SCNC: 22 MMOL/L (ref 22–29)
CREAT SERPL-MCNC: 0.5 MG/DL (ref 0.5–1)
EOSINOPHIL # BLD: 0.12 K/UL (ref 0.05–0.5)
EOSINOPHILS RELATIVE PERCENT: 2 % (ref 0–6)
ERYTHROCYTE [DISTWIDTH] IN BLOOD BY AUTOMATED COUNT: 12.6 % (ref 11.5–15)
FREE KAPPA/LAMBDA RATIO: 1.37 (ref 0.22–1.74)
GFR, ESTIMATED: >90 ML/MIN/1.73M2
GLUCOSE SERPL-MCNC: 84 MG/DL (ref 74–99)
HCT VFR BLD AUTO: 45.3 % (ref 34–48)
HCV AB SERPL QL IA: NONREACTIVE
HGB BLD-MCNC: 15 G/DL (ref 11.5–15.5)
HIV 1+2 AB+HIV1 P24 AG SERPL QL IA: NONREACTIVE
IMM GRANULOCYTES # BLD AUTO: <0.03 K/UL (ref 0–0.58)
IMM GRANULOCYTES NFR BLD: 0 % (ref 0–5)
KAPPA LC FREE SER-MCNC: 14.1 MG/L
LAMBDA LC FREE SERPL-MCNC: 10.3 MG/L (ref 4.2–27.7)
LYMPHOCYTES NFR BLD: 1.95 K/UL (ref 1.5–4)
LYMPHOCYTES RELATIVE PERCENT: 28 % (ref 20–42)
MCH RBC QN AUTO: 30.6 PG (ref 26–35)
MCHC RBC AUTO-ENTMCNC: 33.1 G/DL (ref 32–34.5)
MCV RBC AUTO: 92.4 FL (ref 80–99.9)
MONOCYTES NFR BLD: 0.45 K/UL (ref 0.1–0.95)
MONOCYTES NFR BLD: 7 % (ref 2–12)
NEUTROPHILS NFR BLD: 62 % (ref 43–80)
NEUTS SEG NFR BLD: 4.26 K/UL (ref 1.8–7.3)
PLATELET # BLD AUTO: 266 K/UL (ref 130–450)
PMV BLD AUTO: 9.6 FL (ref 7–12)
POTASSIUM SERPL-SCNC: 4.1 MMOL/L (ref 3.5–5)
PROT SERPL-MCNC: 7 G/DL (ref 6.4–8.3)
RBC # BLD AUTO: 4.9 M/UL (ref 3.5–5.5)
SEND OUT REPORT: NORMAL
SODIUM SERPL-SCNC: 137 MMOL/L (ref 132–146)
TEST NAME: NORMAL
WBC OTHER # BLD: 6.9 K/UL (ref 4.5–11.5)

## 2025-05-15 PROCEDURE — 88185 FLOWCYTOMETRY/TC ADD-ON: CPT

## 2025-05-15 PROCEDURE — 85025 COMPLETE CBC W/AUTO DIFF WBC: CPT

## 2025-05-15 PROCEDURE — 36415 COLL VENOUS BLD VENIPUNCTURE: CPT

## 2025-05-15 PROCEDURE — 86803 HEPATITIS C AB TEST: CPT

## 2025-05-15 PROCEDURE — 87389 HIV-1 AG W/HIV-1&-2 AB AG IA: CPT

## 2025-05-15 PROCEDURE — 84165 PROTEIN E-PHORESIS SERUM: CPT

## 2025-05-15 PROCEDURE — 84155 ASSAY OF PROTEIN SERUM: CPT

## 2025-05-15 PROCEDURE — 88184 FLOWCYTOMETRY/ TC 1 MARKER: CPT

## 2025-05-15 PROCEDURE — 83521 IG LIGHT CHAINS FREE EACH: CPT

## 2025-05-15 PROCEDURE — 80053 COMPREHEN METABOLIC PANEL: CPT

## 2025-05-15 PROCEDURE — 99203 OFFICE O/P NEW LOW 30 MIN: CPT | Performed by: INTERNAL MEDICINE

## 2025-05-15 NOTE — PROGRESS NOTES
Khloe Wray  1995 29 y.o.      Referring Physician:     PCP: Missy Rabago MD    Vitals:    05/15/25 1030   BP: 106/77   Pulse: 66   Temp: 98 °F (36.7 °C)   SpO2: 100%        Wt Readings from Last 3 Encounters:   05/15/25 54.4 kg (120 lb)   04/10/25 55.3 kg (122 lb)   25 61.2 kg (135 lb)        Body mass index is 23.44 kg/m².          Chief Complaint:   Chief Complaint   Patient presents with    New Patient           LMP: May 2025    Age at first Menses: unknown    : 2    Para: 2          Current Outpatient Medications:     vitamin D (ERGOCALCIFEROL) 1.25 MG (30070 UT) CAPS capsule, Take 1 capsule by mouth once a week, Disp: 12 capsule, Rfl: 1    docusate sodium (COLACE) 100 MG capsule, Take 1 capsule by mouth daily, Disp: , Rfl:     albuterol sulfate HFA (VENTOLIN HFA) 108 (90 Base) MCG/ACT inhaler, Inhale 2 puffs into the lungs 4 times daily as needed for Wheezing, Disp: 18 g, Rfl: 0    levETIRAcetam (KEPPRA XR) 500 MG TB24 extended release tablet, TAKE 2 TABLETS BY MOUTH TWICE A DAY, Disp: 360 tablet, Rfl: 3    MULTIPLE VITAMIN PO, Take by mouth, Disp: , Rfl:     VITAMIN D PO, Take by mouth, Disp: , Rfl:     Ascorbic Acid (VITAMIN C PO), Take by mouth, Disp: , Rfl:     aspirin 81 MG EC tablet, Take 1 tablet by mouth daily, Disp: , Rfl:        Past Medical History:   Diagnosis Date    Disease of blood and blood forming organ     mthfr     Missed      MTHFR (methylene THF reductase) deficiency and homocystinuria     ARMANDO-1 4G/4G genotype     Postpartum depression     Seizures (HCC)     Tamulonus, last one     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

## 2025-05-15 NOTE — PROGRESS NOTES
[facility] MEDICAL ONCOLOGY  [address] [facility] [address]  Dept: [phone]  Loc: [phone]    Clinic Consultation Note      Date of Encounter: 05/15/2025     Referring Provider:  Missy Rabago MD      Reason for Visit:   lymphopenia         PCP:  Missy Rabago MD    Demographics: 29 y.o. female    Chief Complaint   Patient presents with    New Patient         History of Present Illness of Initial Consultation :  The patient is a 29 y.o. female with hx of missed pregnancy, MTHFR mutation , ARMANDO 4g/4g genotype, seizure on keppra since she was 12 who is presenting as a referral due to lymphopenia   She is feeling well today without any new complaitns. No fevers, no chills, no night sweats. Weight loss intentionally, she is running Marathons. No lymphadenopathy. No new medications, keppra taking for many years. She had hypercoaguable work up done due to having issues with missed pregnancy prior and was placed on lovenox and has two kids currently with no plans for further pregnancies.   She had bleeding after delivery with the first child and was evaluated by hematology/Dr. Briceño inpatient then, but no issues after that.   Father had hx of colon cancer and she is aware of recommendation to screen earlier for colon cancer. Grandmother had cancer at advanced age. No other cancer or blood disorders in self or family.   No smoking, no alcohol drinking or recreational drugs   She does not think that she gets more infections than usual   She is a nurse      Oncology History    No history exists.           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

## 2025-05-16 LAB
ALBUMIN SERPL-MCNC: 4.2 G/DL (ref 3.5–4.7)
ALPHA1 GLOB SERPL ELPH-MCNC: 0.3 G/DL (ref 0.2–0.4)
ALPHA2 GLOB SERPL ELPH-MCNC: 0.8 G/DL (ref 0.5–1)
B-GLOBULIN SERPL ELPH-MCNC: 1.1 G/DL (ref 0.8–1.3)
GAMMA GLOB SERPL ELPH-MCNC: 1.2 G/DL (ref 0.7–1.6)
PATH REV BLD -IMP: NORMAL
PATHOLOGIST: NORMAL
PROT PATTERN SERPL ELPH-IMP: NORMAL
PROT SERPL-MCNC: 7.5 G/DL (ref 6.4–8.3)

## 2025-05-17 ENCOUNTER — HOSPITAL ENCOUNTER (OUTPATIENT)
Dept: ULTRASOUND IMAGING | Age: 30
Discharge: HOME | End: 2025-05-19
Payer: COMMERCIAL

## 2025-05-17 DIAGNOSIS — D72.819 LEUKOPENIA, UNSPECIFIED TYPE: ICD-10-CM

## 2025-05-17 PROCEDURE — 76705 ECHO EXAM OF ABDOMEN: CPT

## 2025-05-19 LAB
SEND OUT REPORT: NORMAL
TEST NAME: NORMAL

## 2025-05-21 ENCOUNTER — PATIENT MESSAGE (OUTPATIENT)
Dept: FAMILY MEDICINE CLINIC | Age: 30
End: 2025-05-21

## 2025-05-21 DIAGNOSIS — K59.00 CONSTIPATION, UNSPECIFIED CONSTIPATION TYPE: Primary | ICD-10-CM

## 2025-05-30 DIAGNOSIS — G40.209 PARTIAL SYMPTOMATIC EPILEPSY WITH COMPLEX PARTIAL SEIZURES, NOT INTRACTABLE, WITHOUT STATUS EPILEPTICUS (HCC): ICD-10-CM

## 2025-05-30 RX ORDER — LEVETIRACETAM 500 MG/1
TABLET, FILM COATED, EXTENDED RELEASE ORAL
Qty: 360 TABLET | Refills: 3 | Status: SHIPPED | OUTPATIENT
Start: 2025-05-30

## 2025-06-19 ENCOUNTER — SCHEDULED TELEPHONE ENCOUNTER (OUTPATIENT)
Age: 30
End: 2025-06-19
Payer: COMMERCIAL

## 2025-06-19 DIAGNOSIS — D72.810 LYMPHOPENIA: Primary | ICD-10-CM

## 2025-06-19 PROCEDURE — 99212 OFFICE O/P EST SF 10 MIN: CPT | Performed by: INTERNAL MEDICINE

## 2025-06-19 NOTE — PROGRESS NOTES
On this date 6/19/2025 I have spent 10 minutes reviewing previous notes, test results, and other pertinent medical information with the patient, discussing the diagnosis and importance of compliance with the treatment plan as well as documenting on the day of the visit.    Khloe Wray was evaluated through a synchronous (real-time) audio encounter. Patient identification was verified at the start of the visit. She (or guardian if applicable) is aware that this is a billable service, which includes applicable co-pays. This visit was conducted with the patient's (and/or legal guardian's) verbal consent. She has not had a related appointment within my department in the past 7 days or scheduled within the next 24 hours.   The patient was located at Home: [address] [place] [address] [place] [phone].  The provider was located at Facility (Appt Dept): [address] [place] [address].    Note: not billable if this call serves to triage the patient into an appointment for the relevant concern  Yes, I confirm.   Khloe Wray is a 30 y.o. female evaluated via telephone on 6/19/2025 for No chief complaint on file.  .      Assessment & Plan    No follow-ups on file.       ASSESSMENT     Lymphopenia:   Mild Noted on recent labs and on prior labs from 2023  - no B symptoms ( intentionally trying to  lose weight)   - no recurrent infections, no new medications.   - Normal b12 and folate 4/12/2025  - normal other cell lines.     Reassured her that numbers are mild and no other accompanying abnormalities but discussed checking screening labs and liver/spleen US, she is in agreement    Today 6/19/2025:   - normal kappa/lambda  - spep: No monoclonal protein identified.   - flow : no diagnostic immunophenotype identified   - smear: Essentially unremarkable peripheral blood smear   - non reactive hepatitis C and HIV screen  - lymphocytes are normal on repeat cbc/diff  - liver/spleen US with unremarkable RUQ and hyperechoic

## 2025-08-26 ENCOUNTER — HOSPITAL ENCOUNTER (OUTPATIENT)
Dept: LAB | Age: 30
Discharge: HOME | End: 2025-08-26
Payer: COMMERCIAL

## 2025-08-26 ENCOUNTER — OFFICE VISIT (OUTPATIENT)
Dept: FAMILY MEDICINE CLINIC | Age: 30
End: 2025-08-26

## 2025-08-26 VITALS
BODY MASS INDEX: 22.38 KG/M2 | WEIGHT: 114 LBS | HEART RATE: 84 BPM | TEMPERATURE: 98.6 F | DIASTOLIC BLOOD PRESSURE: 58 MMHG | HEIGHT: 60 IN | SYSTOLIC BLOOD PRESSURE: 100 MMHG | RESPIRATION RATE: 18 BRPM | OXYGEN SATURATION: 98 %

## 2025-08-26 DIAGNOSIS — R42 DIZZINESS: ICD-10-CM

## 2025-08-26 DIAGNOSIS — R42 DIZZINESS: Primary | ICD-10-CM

## 2025-08-26 LAB
ALBUMIN SERPL-MCNC: 4.6 G/DL (ref 3.5–5.2)
ALP SERPL-CCNC: 56 U/L (ref 35–104)
ALT SERPL-CCNC: 16 U/L (ref 0–35)
ANION GAP SERPL CALCULATED.3IONS-SCNC: 13 MMOL/L (ref 7–16)
AST SERPL-CCNC: 21 U/L (ref 0–35)
BASOPHILS # BLD: 0.06 K/UL (ref 0–0.2)
BASOPHILS NFR BLD: 1 % (ref 0–2)
BILIRUB SERPL-MCNC: 0.2 MG/DL (ref 0–1.2)
BUN SERPL-MCNC: 24 MG/DL (ref 6–20)
CALCIUM SERPL-MCNC: 10.1 MG/DL (ref 8.6–10)
CHLORIDE SERPL-SCNC: 102 MMOL/L (ref 98–107)
CO2 SERPL-SCNC: 26 MMOL/L (ref 22–29)
CREAT SERPL-MCNC: 0.5 MG/DL (ref 0.5–1)
EOSINOPHIL # BLD: 0.28 K/UL (ref 0.05–0.5)
EOSINOPHILS RELATIVE PERCENT: 4 % (ref 0–6)
ERYTHROCYTE [DISTWIDTH] IN BLOOD BY AUTOMATED COUNT: 12.5 % (ref 11.5–15)
GFR, ESTIMATED: >90 ML/MIN/1.73M2
GLUCOSE SERPL-MCNC: 102 MG/DL (ref 74–99)
HCT VFR BLD AUTO: 43.5 % (ref 34–48)
HGB BLD-MCNC: 14 G/DL (ref 11.5–15.5)
IMM GRANULOCYTES # BLD AUTO: <0.03 K/UL (ref 0–0.58)
IMM GRANULOCYTES NFR BLD: 0 % (ref 0–5)
LYMPHOCYTES NFR BLD: 2.23 K/UL (ref 1.5–4)
LYMPHOCYTES RELATIVE PERCENT: 33 % (ref 20–42)
MAGNESIUM SERPL-MCNC: 2.3 MG/DL (ref 1.6–2.6)
MCH RBC QN AUTO: 30.9 PG (ref 26–35)
MCHC RBC AUTO-ENTMCNC: 32.2 G/DL (ref 32–34.5)
MCV RBC AUTO: 96 FL (ref 80–99.9)
MONOCYTES NFR BLD: 0.4 K/UL (ref 0.1–0.95)
MONOCYTES NFR BLD: 6 % (ref 2–12)
NEUTROPHILS NFR BLD: 55 % (ref 43–80)
NEUTS SEG NFR BLD: 3.69 K/UL (ref 1.8–7.3)
PLATELET # BLD AUTO: 233 K/UL (ref 130–450)
PMV BLD AUTO: 9.7 FL (ref 7–12)
POTASSIUM SERPL-SCNC: 4.8 MMOL/L (ref 3.5–5.1)
PROT SERPL-MCNC: 7.3 G/DL (ref 6.4–8.3)
RBC # BLD AUTO: 4.53 M/UL (ref 3.5–5.5)
SODIUM SERPL-SCNC: 140 MMOL/L (ref 136–145)
WBC OTHER # BLD: 6.7 K/UL (ref 4.5–11.5)

## 2025-08-26 PROCEDURE — 80053 COMPREHEN METABOLIC PANEL: CPT

## 2025-08-26 PROCEDURE — 83735 ASSAY OF MAGNESIUM: CPT

## 2025-08-26 PROCEDURE — 36415 COLL VENOUS BLD VENIPUNCTURE: CPT

## 2025-08-26 PROCEDURE — 85025 COMPLETE CBC W/AUTO DIFF WBC: CPT

## 2025-08-26 RX ORDER — MECLIZINE HYDROCHLORIDE 25 MG/1
25 TABLET ORAL 3 TIMES DAILY PRN
Qty: 15 TABLET | Refills: 0 | Status: SHIPPED | OUTPATIENT
Start: 2025-08-26 | End: 2025-09-05

## 2025-08-26 RX ORDER — PREDNISONE 10 MG/1
TABLET ORAL
Qty: 18 TABLET | Refills: 0 | Status: SHIPPED | OUTPATIENT
Start: 2025-08-26

## 2025-09-03 ENCOUNTER — OFFICE VISIT (OUTPATIENT)
Dept: FAMILY MEDICINE CLINIC | Age: 30
End: 2025-09-03

## 2025-09-03 VITALS
RESPIRATION RATE: 14 BRPM | WEIGHT: 113.5 LBS | HEIGHT: 60 IN | HEART RATE: 77 BPM | DIASTOLIC BLOOD PRESSURE: 74 MMHG | SYSTOLIC BLOOD PRESSURE: 110 MMHG | TEMPERATURE: 98 F | OXYGEN SATURATION: 98 % | BODY MASS INDEX: 22.28 KG/M2

## 2025-09-03 DIAGNOSIS — M79.671 RIGHT FOOT PAIN: Primary | ICD-10-CM

## (undated) DEVICE — DRAPE,REIN 53X77,STERILE: Brand: MEDLINE

## (undated) DEVICE — ELECTRODE PT RET AD L9FT HI MOIST COND ADH HYDRGEL CORDED

## (undated) DEVICE — GLOVE ORANGE PI 7   MSG9070

## (undated) DEVICE — PEN: MARKING STD 100/CS: Brand: MEDICAL ACTION INDUSTRIES

## (undated) DEVICE — TUBE BLD COLLECT ST 1 SIL COAT 7ML 10ML

## (undated) DEVICE — TRAY,VAG PREP,2PR VNYL GLV,4 C: Brand: MEDLINE INDUSTRIES, INC.

## (undated) DEVICE — SUTURE MNCRYL STRATAFIX PS 4-0 30CM

## (undated) DEVICE — GOWN,SIRUS,FABRNF,XL,20/CS: Brand: MEDLINE

## (undated) DEVICE — CONTAINER,SPEC,PNEUM TUBE,3OZ,STRL PATH: Brand: MEDLINE

## (undated) DEVICE — BLADE SURG NO20 S STL STR DISP GLASSVAN

## (undated) DEVICE — APPLICATOR PREP 26ML 0.7% IOD POVACRYLEX 74% ISO ALC ST

## (undated) DEVICE — GOWN,SIRUS,FABRNF,L,20/CS: Brand: MEDLINE

## (undated) DEVICE — 3000CC GUARDIAN II: Brand: GUARDIAN

## (undated) DEVICE — COVER,LIGHT HANDLE,FLX,2/PK: Brand: MEDLINE INDUSTRIES, INC.

## (undated) DEVICE — SUTURE ABSORBABLE MONOFILAMENT 3-0 CT1 18 IN UD MONOCRYL + SXMP1B429

## (undated) DEVICE — Z DISCONTINUED USE 2275676 GLOVE SURG SZ 65 L12IN FNGR THK87MIL DK GRN LTX FREE ISOLEX

## (undated) DEVICE — GOWN,SIRUS,POLYRNF,BRTHSLV,XLN/XL,20/CS: Brand: MEDLINE

## (undated) DEVICE — LEGGINGS: Brand: CONVERTORS

## (undated) DEVICE — LEGGINGS, PAIR, 31X48, STERILE: Brand: MEDLINE

## (undated) DEVICE — SHEET,DRAPE,53X77,STERILE: Brand: MEDLINE

## (undated) DEVICE — SUTURE VCRL + SZ 0 L36IN ABSRB VLT L36MM CT-1 1/2 CIR VCP346H

## (undated) DEVICE — TRAY SET D

## (undated) DEVICE — GAUZE,SPONGE,4"X4",16PLY,XRAY,STRL,LF: Brand: MEDLINE

## (undated) DEVICE — 18 GA N.G. KIT, 10 PACK: Brand: SITE-RITE

## (undated) DEVICE — TUBING, SUCTION, 3/16" X 12', STRAIGHT: Brand: MEDLINE

## (undated) DEVICE — CESAREAN BIRTH PACK II: Brand: MEDLINE INDUSTRIES, INC.

## (undated) DEVICE — TOWEL,OR,DSP,ST,BLUE,STD,6/PK,12PK/CS: Brand: MEDLINE

## (undated) DEVICE — Device: Brand: PORTEX

## (undated) DEVICE — HYPODERMIC SAFETY NEEDLE: Brand: MAGELLAN

## (undated) DEVICE — JELLY,LUBE,STERILE,FLIP TOP,TUBE,2-OZ: Brand: MEDLINE

## (undated) DEVICE — GLOVE SURG SZ 65 L12IN FNGR THK83MIL CRM POLYISOPRENE

## (undated) DEVICE — CURETTE VAC DIA7MM PLAS CRV OPN TIP RIG STR FIRM W/ FRST

## (undated) DEVICE — PAD,SANITARY,11 IN,MAXI,N-STRL,IND WRAP: Brand: MEDLINE

## (undated) DEVICE — DRESSING FOAM POST OPERATIVE 4X10 IN MEPILEX BORDER AG

## (undated) DEVICE — TRAP TISS DISP FOR COLL SYS BERK SAFETOUCH

## (undated) DEVICE — STRIP,CLOSURE,WOUND,MEDI-STRIP,1/2X4: Brand: MEDLINE

## (undated) DEVICE — 3M™ STERI-STRIP™ COMPOUND BENZOIN TINCTURE 40 BAGS/CARTON 4 CARTONS/CASE C1544: Brand: 3M™ STERI-STRIP™

## (undated) DEVICE — DRAPE,UNDERBUTTOCKS,PCH,STERILE: Brand: MEDLINE

## (undated) DEVICE — GLOVE SURG SZ 7 L12IN FNGR THK79MIL GRN LTX FREE

## (undated) DEVICE — MARKER,SKIN,WI/RULER AND LABELS: Brand: MEDLINE

## (undated) DEVICE — SET FLD COLL CLR W/ BLT IN WARN SYS FOR HYSTSCP DOLPHIN

## (undated) DEVICE — PENCIL ES L3M BTTN SWCH HOLSTER W/ BLDE ELECTRD EDGE

## (undated) DEVICE — GLOVE SURG SZ 65 L12IN FNGR THK79MIL GRN LTX FREE

## (undated) DEVICE — DOUBLE BASIN SET: Brand: MEDLINE INDUSTRIES, INC.

## (undated) DEVICE — COUNTER NDL 30 COUNT DBL MAG

## (undated) DEVICE — CATHETERIZATION KIT FOL16 FR 2000 CC DRAINAGE BG LUBRICATH

## (undated) DEVICE — HANDLE SUCT TBNG L6FR DIA3/8IN SWVL W/ M ADPT FOR BERK PMP

## (undated) DEVICE — MEDI-VAC YANKAUER SUCTION HANDLE W/BULBOUS TIP: Brand: CARDINAL HEALTH

## (undated) DEVICE — CONTAINER SPEC 64OZ POLYPR PATH SNAP LOK CAP W/ LID

## (undated) DEVICE — SYSTEM COLL W/ TISS TRAP INCLUDE COLL CANSTR LID SET OF

## (undated) DEVICE — SUTURE STRATAFIX SPRL SZ 1 L14IN ABSRB VLT L48CM CTX 1/2 SXPD2B405

## (undated) DEVICE — SPONGE LAP W18XL18IN WHT COT 4 PLY FLD STRUNG RADPQ DISP ST

## (undated) DEVICE — SOLUTION IV IRRIG POUR BRL 0.9% SODIUM CHL 2F7124